# Patient Record
Sex: MALE | Race: WHITE | NOT HISPANIC OR LATINO | Employment: FULL TIME | RURAL
[De-identification: names, ages, dates, MRNs, and addresses within clinical notes are randomized per-mention and may not be internally consistent; named-entity substitution may affect disease eponyms.]

---

## 2021-04-20 DIAGNOSIS — R00.2 PALPITATIONS: Primary | ICD-10-CM

## 2021-04-20 RX ORDER — NYSTATIN 100000 [USP'U]/ML
SUSPENSION ORAL
COMMUNITY
Start: 2021-03-04 | End: 2022-02-18 | Stop reason: ALTCHOICE

## 2021-04-20 RX ORDER — ETANERCEPT 50 MG/ML
50 SOLUTION SUBCUTANEOUS WEEKLY
COMMUNITY

## 2021-04-20 RX ORDER — METOPROLOL SUCCINATE 25 MG/1
25 TABLET, EXTENDED RELEASE ORAL DAILY
Qty: 90 TABLET | Refills: 2 | Status: SHIPPED | OUTPATIENT
Start: 2021-04-20 | End: 2022-01-20 | Stop reason: SDUPTHER

## 2021-04-20 RX ORDER — METOPROLOL SUCCINATE 25 MG/1
25 TABLET, EXTENDED RELEASE ORAL DAILY
COMMUNITY
End: 2021-04-20 | Stop reason: SDUPTHER

## 2021-04-20 RX ORDER — FLUTICASONE PROPIONATE 50 MCG
1 SPRAY, SUSPENSION (ML) NASAL 2 TIMES DAILY
COMMUNITY
End: 2023-04-02 | Stop reason: CLARIF

## 2021-04-20 RX ORDER — PANTOPRAZOLE SODIUM 40 MG/1
40 TABLET, DELAYED RELEASE ORAL DAILY
COMMUNITY

## 2021-08-24 ENCOUNTER — CLINICAL SUPPORT (OUTPATIENT)
Dept: PRIMARY CARE CLINIC | Facility: CLINIC | Age: 41
End: 2021-08-24

## 2021-08-24 DIAGNOSIS — R94.120 ABNORMAL AUDIOGRAM: ICD-10-CM

## 2021-08-24 PROCEDURE — 92552 PURE TONE AUDIOMETRY AIR: CPT | Mod: ,,, | Performed by: NURSE PRACTITIONER

## 2021-08-24 PROCEDURE — 92552 PR PURE TONE AUDIOMETRY, AIR: ICD-10-PCS | Mod: ,,, | Performed by: NURSE PRACTITIONER

## 2021-11-08 ENCOUNTER — OFFICE VISIT (OUTPATIENT)
Dept: CARDIOLOGY | Facility: CLINIC | Age: 41
End: 2021-11-08
Payer: OTHER GOVERNMENT

## 2021-11-08 VITALS
OXYGEN SATURATION: 97 % | SYSTOLIC BLOOD PRESSURE: 100 MMHG | HEIGHT: 72 IN | DIASTOLIC BLOOD PRESSURE: 60 MMHG | WEIGHT: 315 LBS | BODY MASS INDEX: 42.66 KG/M2 | HEART RATE: 67 BPM

## 2021-11-08 DIAGNOSIS — R00.2 PALPITATIONS: Primary | ICD-10-CM

## 2021-11-08 PROCEDURE — 93010 ELECTROCARDIOGRAM REPORT: CPT | Mod: S$PBB,,, | Performed by: INTERNAL MEDICINE

## 2021-11-08 PROCEDURE — 99213 PR OFFICE/OUTPT VISIT, EST, LEVL III, 20-29 MIN: ICD-10-PCS | Mod: S$PBB,,, | Performed by: INTERNAL MEDICINE

## 2021-11-08 PROCEDURE — 93010 EKG 12-LEAD: ICD-10-PCS | Mod: S$PBB,,, | Performed by: INTERNAL MEDICINE

## 2021-11-08 PROCEDURE — 99213 OFFICE O/P EST LOW 20 MIN: CPT | Mod: S$PBB,,, | Performed by: INTERNAL MEDICINE

## 2021-11-08 PROCEDURE — 93005 ELECTROCARDIOGRAM TRACING: CPT | Mod: PBBFAC | Performed by: INTERNAL MEDICINE

## 2021-11-08 PROCEDURE — 99213 OFFICE O/P EST LOW 20 MIN: CPT | Mod: PBBFAC | Performed by: INTERNAL MEDICINE

## 2022-02-18 ENCOUNTER — OFFICE VISIT (OUTPATIENT)
Dept: FAMILY MEDICINE | Facility: CLINIC | Age: 42
End: 2022-02-18
Payer: OTHER GOVERNMENT

## 2022-02-18 VITALS
HEIGHT: 72 IN | RESPIRATION RATE: 20 BRPM | DIASTOLIC BLOOD PRESSURE: 84 MMHG | TEMPERATURE: 99 F | BODY MASS INDEX: 41.31 KG/M2 | SYSTOLIC BLOOD PRESSURE: 128 MMHG | HEART RATE: 71 BPM | WEIGHT: 305 LBS | OXYGEN SATURATION: 96 %

## 2022-02-18 DIAGNOSIS — M25.579 ANKLE PAIN, UNSPECIFIED CHRONICITY, UNSPECIFIED LATERALITY: Primary | ICD-10-CM

## 2022-02-18 DIAGNOSIS — M79.672 LEFT FOOT PAIN: ICD-10-CM

## 2022-02-18 PROCEDURE — 99203 OFFICE O/P NEW LOW 30 MIN: CPT | Mod: ,,, | Performed by: FAMILY MEDICINE

## 2022-02-18 PROCEDURE — 99203 PR OFFICE/OUTPT VISIT, NEW, LEVL III, 30-44 MIN: ICD-10-PCS | Mod: ,,, | Performed by: FAMILY MEDICINE

## 2022-02-18 NOTE — PROGRESS NOTES
Jigar Larson is a 41 y.o. male seen today for acute injury to his left ankle were patient misstepped stepping down from a ladder.  He is able to bear weight on the affected ankle but this is very painful and swelling is already started to take effect after 1 hour.    Past Medical History:   Diagnosis Date    GERD (gastroesophageal reflux disease)     Palpitations     Rheumatoid arthritis      Family History   Problem Relation Age of Onset    Allergies Brother      Current Outpatient Medications on File Prior to Visit   Medication Sig Dispense Refill    etanercept (ENBREL) 50 mg/mL (1 mL) injection Inject 50 mg into the skin once a week.      fluticasone propionate (FLONASE) 50 mcg/actuation nasal spray 1 spray by Each Nostril route 2 (two) times a day.      metoprolol succinate (TOPROL-XL) 25 MG 24 hr tablet Take 1 tablet (25 mg total) by mouth once daily. 90 tablet 2    pantoprazole (PROTONIX) 40 MG tablet Take 40 mg by mouth once daily.      nystatin (MYCOSTATIN) 100,000 unit/mL suspension SWISH & SPIT ONE TEASPOONFUL BY MOUTH 4 TIMES DAILY ...AVOID EATING 5-10 MINUTES AFTER EACH USE (SHAKE WELL)       No current facility-administered medications on file prior to visit.     Immunization History   Administered Date(s) Administered    COVID-19, MRNA, LN-S, PF (Pfizer) (Purple Cap) 04/09/2021, 04/30/2021       Review of Systems   Musculoskeletal: Positive for joint pain and myalgias.        Vitals:    02/18/22 1437   BP: 128/84   Pulse: 71   Resp: 20   Temp: 98.9 °F (37.2 °C)       Physical Exam  Musculoskeletal:      Left ankle: Tenderness present. Decreased range of motion.        Legs:       Comments: Patient has marked lateral ankle swelling with a negative drawer test.          Assessment and Plan  Ankle pain, unspecified chronicity, unspecified laterality  -     X-Ray Ankle Complete 3 View Left; Future; Expected date: 02/18/2022    Left foot pain  -     X-Ray Foot 2 View Left; Future; Expected date:  02/18/2022            Return to clinic in 1 week for follow-up or as needed if symptoms worsen.  I have recommended RICE therapy and no severe weight-bearing.    The patient has no Health Maintenance topics of status Not Due

## 2022-02-21 ENCOUNTER — TELEPHONE (OUTPATIENT)
Dept: FAMILY MEDICINE | Facility: CLINIC | Age: 42
End: 2022-02-21
Payer: OTHER GOVERNMENT

## 2022-02-21 NOTE — TELEPHONE ENCOUNTER
----- Message from Rasta Carmichael MD sent at 2/18/2022  5:09 PM CST -----  No fracture but he does have heel spur and some arthritic changes.

## 2022-02-25 ENCOUNTER — OFFICE VISIT (OUTPATIENT)
Dept: FAMILY MEDICINE | Facility: CLINIC | Age: 42
End: 2022-02-25
Payer: OTHER GOVERNMENT

## 2022-02-25 VITALS
HEART RATE: 71 BPM | WEIGHT: 305 LBS | DIASTOLIC BLOOD PRESSURE: 86 MMHG | RESPIRATION RATE: 18 BRPM | HEIGHT: 72 IN | OXYGEN SATURATION: 99 % | TEMPERATURE: 98 F | BODY MASS INDEX: 41.31 KG/M2 | SYSTOLIC BLOOD PRESSURE: 134 MMHG

## 2022-02-25 DIAGNOSIS — G89.29 CHRONIC MIDLINE LOW BACK PAIN WITHOUT SCIATICA: Primary | ICD-10-CM

## 2022-02-25 DIAGNOSIS — M54.50 CHRONIC MIDLINE LOW BACK PAIN WITHOUT SCIATICA: Primary | ICD-10-CM

## 2022-02-25 DIAGNOSIS — M25.579 ANKLE PAIN, UNSPECIFIED CHRONICITY, UNSPECIFIED LATERALITY: ICD-10-CM

## 2022-02-25 PROCEDURE — 99213 PR OFFICE/OUTPT VISIT, EST, LEVL III, 20-29 MIN: ICD-10-PCS | Mod: ,,, | Performed by: FAMILY MEDICINE

## 2022-02-25 PROCEDURE — 99213 OFFICE O/P EST LOW 20 MIN: CPT | Mod: ,,, | Performed by: FAMILY MEDICINE

## 2022-02-25 RX ORDER — MELOXICAM 7.5 MG/1
7.5 TABLET ORAL DAILY PRN
Qty: 30 TABLET | Refills: 2 | Status: SHIPPED | OUTPATIENT
Start: 2022-02-25 | End: 2022-05-31 | Stop reason: SDUPTHER

## 2022-02-25 RX ORDER — TIZANIDINE 4 MG/1
4 TABLET ORAL NIGHTLY PRN
Qty: 30 TABLET | Refills: 2 | Status: SHIPPED | OUTPATIENT
Start: 2022-02-25 | End: 2022-05-22 | Stop reason: SDUPTHER

## 2022-02-25 NOTE — PROGRESS NOTES
Jigar Larson is a 41 y.o. male seen today for follow-up on his ankle sprain.  Patient reports a marked improvement in his symptoms and he is now able to weightbear with minimal discomfort.  We discussed slowly returning back to his baseline activity but he can come out of the ankle splint.  He complains of intermittent symptoms of low back pain primarily on the right side that usually flares up when he overdoes it.  We discussed the use of a muscle relaxant and anti-inflammatory with food as needed.        Past Medical History:   Diagnosis Date    GERD (gastroesophageal reflux disease)     Palpitations     Rheumatoid arthritis      Family History   Problem Relation Age of Onset    Allergies Brother      Current Outpatient Medications on File Prior to Visit   Medication Sig Dispense Refill    etanercept (ENBREL) 50 mg/mL (1 mL) injection Inject 50 mg into the skin once a week.      fluticasone propionate (FLONASE) 50 mcg/actuation nasal spray 1 spray by Each Nostril route 2 (two) times a day.      metoprolol succinate (TOPROL-XL) 25 MG 24 hr tablet Take 1 tablet (25 mg total) by mouth once daily. 90 tablet 2    pantoprazole (PROTONIX) 40 MG tablet Take 40 mg by mouth once daily.       No current facility-administered medications on file prior to visit.     Immunization History   Administered Date(s) Administered    COVID-19, MRNA, LN-S, PF (Pfizer) (Purple Cap) 04/09/2021, 04/30/2021       Review of Systems   Musculoskeletal: Positive for back pain, joint pain and myalgias.        Vitals:    02/25/22 1313   BP: 134/86   Pulse: 71   Resp: 18   Temp: 97.9 °F (36.6 °C)       Physical Exam  Eyes:      Conjunctiva/sclera: Conjunctivae normal.   Pulmonary:      Effort: Pulmonary effort is normal.   Musculoskeletal:      Lumbar back: Spasms and tenderness present. Decreased range of motion.        Back:       Left ankle: Swelling present. No tenderness. Decreased range of motion.      Comments: Patient now has only  trace swelling to the left ankle with only mild decreased range of motion and no tenderness.   Skin:     General: Skin is warm and dry.   Neurological:      Mental Status: He is alert.          Assessment and Plan  Chronic midline low back pain without sciatica  -     meloxicam (MOBIC) 7.5 MG tablet; Take 1 tablet (7.5 mg total) by mouth daily as needed for Pain (take with food).  Dispense: 30 tablet; Refill: 2  -     tiZANidine (ZANAFLEX) 4 MG tablet; Take 1 tablet (4 mg total) by mouth nightly as needed (spasm).  Dispense: 30 tablet; Refill: 2    Ankle pain, unspecified chronicity, unspecified laterality            Return to clinic as needed and for his wellness visit.    The patient has no Health Maintenance topics of status Not Due

## 2022-04-14 ENCOUNTER — OFFICE VISIT (OUTPATIENT)
Dept: FAMILY MEDICINE | Facility: CLINIC | Age: 42
End: 2022-04-14
Payer: OTHER GOVERNMENT

## 2022-04-14 VITALS
OXYGEN SATURATION: 98 % | SYSTOLIC BLOOD PRESSURE: 120 MMHG | BODY MASS INDEX: 41.31 KG/M2 | TEMPERATURE: 99 F | HEART RATE: 66 BPM | RESPIRATION RATE: 18 BRPM | HEIGHT: 72 IN | DIASTOLIC BLOOD PRESSURE: 84 MMHG | WEIGHT: 305 LBS

## 2022-04-14 DIAGNOSIS — Z00.00 ROUTINE GENERAL MEDICAL EXAMINATION AT A HEALTH CARE FACILITY: Primary | ICD-10-CM

## 2022-04-14 DIAGNOSIS — Z13.1 SCREENING FOR DIABETES MELLITUS: ICD-10-CM

## 2022-04-14 DIAGNOSIS — Z13.220 SCREENING FOR LIPOID DISORDERS: ICD-10-CM

## 2022-04-14 PROCEDURE — 99396 PREV VISIT EST AGE 40-64: CPT | Mod: ,,, | Performed by: FAMILY MEDICINE

## 2022-04-14 PROCEDURE — 99396 PR PREVENTIVE VISIT,EST,40-64: ICD-10-PCS | Mod: ,,, | Performed by: FAMILY MEDICINE

## 2022-04-14 NOTE — PROGRESS NOTES
Jigra Larson is a 42 y.o. male seen today for his wellness visit.  He denies any chest pain or shortness of breath.  Patient is not fasting today for lab work but will come in next week.  He is doing well with no new complaints.      Past Medical History:   Diagnosis Date    GERD (gastroesophageal reflux disease)     Palpitations     Rheumatoid arthritis      Family History   Problem Relation Age of Onset    Allergies Brother      Current Outpatient Medications on File Prior to Visit   Medication Sig Dispense Refill    etanercept (ENBREL) 50 mg/mL (1 mL) injection Inject 50 mg into the skin once a week.      fluticasone propionate (FLONASE) 50 mcg/actuation nasal spray 1 spray by Each Nostril route 2 (two) times a day.      meloxicam (MOBIC) 7.5 MG tablet Take 1 tablet (7.5 mg total) by mouth daily as needed for Pain (take with food). 30 tablet 2    metoprolol succinate (TOPROL-XL) 25 MG 24 hr tablet Take 1 tablet (25 mg total) by mouth once daily. 90 tablet 2    pantoprazole (PROTONIX) 40 MG tablet Take 40 mg by mouth once daily.       No current facility-administered medications on file prior to visit.     Immunization History   Administered Date(s) Administered    COVID-19, MRNA, LN-S, PF (Pfizer) (Purple Cap) 04/09/2021, 04/30/2021       Review of Systems   Constitutional: Negative for fever, malaise/fatigue and weight loss.   Respiratory: Negative for shortness of breath.    Cardiovascular: Negative for chest pain and palpitations.   Gastrointestinal: Negative for nausea and vomiting.   Psychiatric/Behavioral: Negative for depression.        Vitals:    04/14/22 1107   BP: 120/84   Pulse: 66   Resp: 18   Temp: 98.6 °F (37 °C)       Physical Exam  Vitals reviewed.   Constitutional:       Appearance: Normal appearance.   HENT:      Head: Normocephalic.   Eyes:      Extraocular Movements: Extraocular movements intact.      Conjunctiva/sclera: Conjunctivae normal.      Pupils: Pupils are equal, round, and  reactive to light.   Neck:      Thyroid: No thyroid mass or thyromegaly.   Cardiovascular:      Rate and Rhythm: Normal rate and regular rhythm.      Heart sounds: Normal heart sounds. No murmur heard.    No gallop.   Pulmonary:      Effort: Pulmonary effort is normal. No respiratory distress.      Breath sounds: Normal breath sounds. No wheezing or rales.   Skin:     General: Skin is warm and dry.      Coloration: Skin is not jaundiced or pale.   Neurological:      Mental Status: He is alert.   Psychiatric:         Mood and Affect: Mood normal.         Behavior: Behavior normal.         Thought Content: Thought content normal.         Judgment: Judgment normal.          Assessment and Plan  Routine general medical examination at a health care facility    Screening for diabetes mellitus  -     Lipid Panel; Future; Expected date: 04/21/2022    Screening for lipoid disorders  -     Glucose, Fasting; Future; Expected date: 04/21/2022            Return to clinic in 6 months or as needed once his lab work is in.    The patient has no Health Maintenance topics of status Not Due

## 2022-04-25 ENCOUNTER — TELEPHONE (OUTPATIENT)
Dept: FAMILY MEDICINE | Facility: CLINIC | Age: 42
End: 2022-04-25
Payer: OTHER GOVERNMENT

## 2022-04-25 NOTE — TELEPHONE ENCOUNTER
----- Message from Rasta Carmichael MD sent at 4/19/2022  7:55 AM CDT -----  Office visit her LDL cholesterol.

## 2022-08-29 ENCOUNTER — CLINICAL SUPPORT (OUTPATIENT)
Dept: PRIMARY CARE CLINIC | Facility: CLINIC | Age: 42
End: 2022-08-29
Payer: OTHER GOVERNMENT

## 2022-08-29 DIAGNOSIS — Z01.10 ENCOUNTER FOR HEARING EXAMINATION, UNSPECIFIED WHETHER ABNORMAL FINDINGS: Primary | ICD-10-CM

## 2022-08-29 PROCEDURE — 92552 PR PURE TONE AUDIOMETRY, AIR: ICD-10-PCS | Mod: ,,, | Performed by: NURSE PRACTITIONER

## 2022-08-29 PROCEDURE — 92552 PURE TONE AUDIOMETRY AIR: CPT | Mod: ,,, | Performed by: NURSE PRACTITIONER

## 2022-08-29 NOTE — PROGRESS NOTES
Subjective:       Patient ID: Jigar Larson is a 42 y.o. male.    Chief Complaint: No chief complaint on file.    HPI  Review of Systems      Objective:      Physical Exam    Assessment:       Problem List Items Addressed This Visit    None  Visit Diagnoses       Encounter for hearing examination, unspecified whether abnormal findings    -  Primary            Plan:       Audiogram only

## 2022-10-14 ENCOUNTER — OFFICE VISIT (OUTPATIENT)
Dept: FAMILY MEDICINE | Facility: CLINIC | Age: 42
End: 2022-10-14
Payer: OTHER GOVERNMENT

## 2022-10-14 VITALS
WEIGHT: 305 LBS | BODY MASS INDEX: 41.31 KG/M2 | HEIGHT: 72 IN | OXYGEN SATURATION: 97 % | RESPIRATION RATE: 18 BRPM | SYSTOLIC BLOOD PRESSURE: 136 MMHG | TEMPERATURE: 98 F | HEART RATE: 77 BPM | DIASTOLIC BLOOD PRESSURE: 86 MMHG

## 2022-10-14 DIAGNOSIS — G89.29 CHRONIC MIDLINE LOW BACK PAIN WITHOUT SCIATICA: Primary | ICD-10-CM

## 2022-10-14 DIAGNOSIS — Z23 NEED FOR IMMUNIZATION AGAINST INFLUENZA: ICD-10-CM

## 2022-10-14 DIAGNOSIS — M54.50 CHRONIC MIDLINE LOW BACK PAIN WITHOUT SCIATICA: Primary | ICD-10-CM

## 2022-10-14 PROCEDURE — 99213 OFFICE O/P EST LOW 20 MIN: CPT | Mod: 25,,, | Performed by: FAMILY MEDICINE

## 2022-10-14 PROCEDURE — 90686 IIV4 VACC NO PRSV 0.5 ML IM: CPT | Mod: ,,, | Performed by: FAMILY MEDICINE

## 2022-10-14 PROCEDURE — 90471 FLU VACCINE (QUAD) GREATER THAN OR EQUAL TO 3YO PRESERVATIVE FREE IM: ICD-10-PCS | Mod: ,,, | Performed by: FAMILY MEDICINE

## 2022-10-14 PROCEDURE — 90686 FLU VACCINE (QUAD) GREATER THAN OR EQUAL TO 3YO PRESERVATIVE FREE IM: ICD-10-PCS | Mod: ,,, | Performed by: FAMILY MEDICINE

## 2022-10-14 PROCEDURE — 99213 PR OFFICE/OUTPT VISIT, EST, LEVL III, 20-29 MIN: ICD-10-PCS | Mod: 25,,, | Performed by: FAMILY MEDICINE

## 2022-10-14 PROCEDURE — 90471 IMMUNIZATION ADMIN: CPT | Mod: ,,, | Performed by: FAMILY MEDICINE

## 2022-10-14 RX ORDER — MELOXICAM 7.5 MG/1
TABLET ORAL
Qty: 30 TABLET | Refills: 2 | Status: SHIPPED | OUTPATIENT
Start: 2022-10-14 | End: 2023-04-02 | Stop reason: CLARIF

## 2022-10-14 NOTE — PROGRESS NOTES
Jigar Larson is a 42 y.o. male seen today for follow-up on his chronic low back pain. Patient reports the symptoms are intermittent and his meloxicam is working with the flare.  Patient has had no chest pain and shortness of breath is doing well.  Patient's lab work did show glucose and lipid panel that were to goal.      Past Medical History:   Diagnosis Date    GERD (gastroesophageal reflux disease)     Palpitations     Rheumatoid arthritis      Family History   Problem Relation Age of Onset    Allergies Brother      Current Outpatient Medications on File Prior to Visit   Medication Sig Dispense Refill    etanercept (ENBREL) 50 mg/mL (1 mL) injection Inject 50 mg into the skin once a week.      fluticasone propionate (FLONASE) 50 mcg/actuation nasal spray 1 spray by Each Nostril route 2 (two) times a day.      metoprolol succinate (TOPROL-XL) 25 MG 24 hr tablet Take 1 tablet (25 mg total) by mouth once daily. 90 tablet 2    pantoprazole (PROTONIX) 40 MG tablet Take 40 mg by mouth once daily.      [DISCONTINUED] meloxicam (MOBIC) 7.5 MG tablet TAKE 1 TABLET BY MOUTH ONCE DAILY AS NEEDED FOR PAIN WITH FOOD OR MILK 30 tablet 2     No current facility-administered medications on file prior to visit.     Immunization History   Administered Date(s) Administered    COVID-19, MRNA, LN-S, PF (Pfizer) (Purple Cap) 04/09/2021, 04/30/2021    Influenza - Quadrivalent - PF *Preferred* (6 months and older) 10/14/2022       Review of Systems   HENT:  Negative for hearing loss.    Eyes:  Negative for discharge.   Respiratory:  Negative for wheezing.    Cardiovascular:  Negative for chest pain and palpitations.   Gastrointestinal:  Negative for blood in stool, constipation, diarrhea and vomiting.   Genitourinary:  Negative for hematuria and urgency.   Musculoskeletal:  Positive for back pain. Negative for neck pain.   Neurological:  Negative for weakness and headaches.   Endo/Heme/Allergies:  Negative for polydipsia.      Vitals:     10/14/22 1412   BP: 136/86   Pulse: 77   Resp: 18   Temp: 98.4 °F (36.9 °C)       Physical Exam  Vitals reviewed.   Constitutional:       Appearance: Normal appearance.   HENT:      Head: Normocephalic.   Eyes:      Extraocular Movements: Extraocular movements intact.      Conjunctiva/sclera: Conjunctivae normal.      Pupils: Pupils are equal, round, and reactive to light.   Neck:      Thyroid: No thyroid mass or thyromegaly.   Cardiovascular:      Rate and Rhythm: Normal rate and regular rhythm.      Heart sounds: Normal heart sounds. No murmur heard.    No gallop.   Pulmonary:      Effort: Pulmonary effort is normal. No respiratory distress.      Breath sounds: Normal breath sounds. No wheezing or rales.   Skin:     General: Skin is warm and dry.      Coloration: Skin is not jaundiced or pale.   Neurological:      Mental Status: He is alert.   Psychiatric:         Mood and Affect: Mood normal.         Behavior: Behavior normal.         Thought Content: Thought content normal.         Judgment: Judgment normal.        Assessment and Plan  Chronic midline low back pain without sciatica  -     meloxicam (MOBIC) 7.5 MG tablet; TAKE 1 TABLET BY MOUTH ONCE DAILY AS NEEDED FOR PAIN WITH FOOD OR MILK Strength: 7.5 mg  Dispense: 30 tablet; Refill: 2    Need for immunization against influenza  -     Influenza - Quadrivalent *Preferred* (6 months+) (PF)          Return to clinic there is wellness as severe or as negative    Health Maintenance Topics with due status: Not Due       Topic Last Completion Date    Lipid Panel 04/18/2022         Answers submitted by the patient for this visit:  Review of Systems Questionnaire (Submitted on 10/14/2022)  activity change: No  unexpected weight change: No  rhinorrhea: No  trouble swallowing: No  visual disturbance: No  chest tightness: No  polyuria: No  difficulty urinating: No  joint swelling: Yes  arthralgias: Yes  confusion: No  dysphoric mood: No

## 2022-11-08 ENCOUNTER — OFFICE VISIT (OUTPATIENT)
Dept: CARDIOLOGY | Facility: CLINIC | Age: 42
End: 2022-11-08
Payer: OTHER GOVERNMENT

## 2022-11-08 VITALS
BODY MASS INDEX: 42.26 KG/M2 | SYSTOLIC BLOOD PRESSURE: 132 MMHG | HEIGHT: 72 IN | OXYGEN SATURATION: 97 % | WEIGHT: 312 LBS | DIASTOLIC BLOOD PRESSURE: 76 MMHG | HEART RATE: 72 BPM

## 2022-11-08 DIAGNOSIS — R00.2 PALPITATIONS: ICD-10-CM

## 2022-11-08 DIAGNOSIS — Z86.79 HISTORY OF ATRIAL FIBRILLATION: ICD-10-CM

## 2022-11-08 DIAGNOSIS — I47.29 NSVT (NONSUSTAINED VENTRICULAR TACHYCARDIA): Primary | ICD-10-CM

## 2022-11-08 PROCEDURE — 93010 EKG 12-LEAD: ICD-10-PCS | Mod: S$PBB,,, | Performed by: STUDENT IN AN ORGANIZED HEALTH CARE EDUCATION/TRAINING PROGRAM

## 2022-11-08 PROCEDURE — 99213 OFFICE O/P EST LOW 20 MIN: CPT | Mod: S$PBB,,, | Performed by: NURSE PRACTITIONER

## 2022-11-08 PROCEDURE — 99213 OFFICE O/P EST LOW 20 MIN: CPT | Mod: PBBFAC | Performed by: NURSE PRACTITIONER

## 2022-11-08 PROCEDURE — 99213 PR OFFICE/OUTPT VISIT, EST, LEVL III, 20-29 MIN: ICD-10-PCS | Mod: S$PBB,,, | Performed by: NURSE PRACTITIONER

## 2022-11-08 PROCEDURE — 93010 ELECTROCARDIOGRAM REPORT: CPT | Mod: S$PBB,,, | Performed by: STUDENT IN AN ORGANIZED HEALTH CARE EDUCATION/TRAINING PROGRAM

## 2022-11-08 PROCEDURE — 93005 ELECTROCARDIOGRAM TRACING: CPT | Mod: PBBFAC | Performed by: STUDENT IN AN ORGANIZED HEALTH CARE EDUCATION/TRAINING PROGRAM

## 2022-11-08 RX ORDER — METOPROLOL SUCCINATE 25 MG/1
25 TABLET, EXTENDED RELEASE ORAL DAILY
Qty: 90 TABLET | Refills: 2 | Status: SHIPPED | OUTPATIENT
Start: 2022-11-08 | End: 2023-04-28

## 2022-11-10 PROBLEM — Z86.79 HISTORY OF ATRIAL FIBRILLATION: Status: ACTIVE | Noted: 2022-11-10

## 2022-11-10 PROBLEM — I47.29 NSVT (NONSUSTAINED VENTRICULAR TACHYCARDIA): Status: ACTIVE | Noted: 2022-11-10

## 2022-11-10 NOTE — PROGRESS NOTES
PCP: Rasta Carmichael MD    Referring Provider:     Subjective:   Jigar Larson is a 42 y.o. male with hx of atrial fibrillation in 2010, sleep apnea compliant with cpap who presents for 1 year follow up, previous pt of Dr. Aguirre.  41 y.o. male with hx of atrial fibrillation in 2010, sleep apnea, who presents for follow up.      Patient states he has had no further episodes of heart racing.       Denies any CP, dyspnea, palpitations or syncope.              Fhx: Family history negative for sudden death.  Shx:     EKG 11/8/22: NSR with T wave inversion in III & aVF, no changes when compared to previous EKG 11/8/2021 11/8/21:  Normal sinus rhythm.  Holter 8/4/2020:  Maximum heart rate of 137 bpm was sinus tachycardia. Short run of nonsustained ventricular tachycardia, 5 beats.                              Overall PVC burden is less than 1%  ECHO 8/4/2022: Normal left ventricular cavity with overall normal systolic function. LVEF 55-60%.                              No significant valvular abnormalities were noted.   C 8/25/2020:  Nonobstructive coronary artery disease.                             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      No results found for: NA, K, CL, CO2, BUN, CREATININE, CALCIUM, ANIONGAP, ESTGFRAFRICA, EGFRNONAA    Lab Results   Component Value Date    CHOL 162 04/18/2022     Lab Results   Component Value Date    HDL 45 04/18/2022     Lab Results   Component Value Date    LDLCALC 95 04/18/2022     Lab Results   Component Value Date    TRIG 109 04/18/2022     Lab Results   Component Value Date    CHOLHDL 3.6 04/18/2022       No results found for: WBC, HGB, HCT, MCV, PLT        Current Outpatient Medications:     etanercept (ENBREL) 50 mg/mL (1 mL) injection, Inject 50 mg into the skin once a week., Disp: , Rfl:     fluticasone propionate (FLONASE) 50 mcg/actuation nasal spray, 1 spray by Each Nostril route 2 (two) times a day., Disp: , Rfl:      meloxicam (MOBIC) 7.5 MG tablet, TAKE 1 TABLET BY MOUTH ONCE DAILY AS NEEDED FOR PAIN WITH FOOD OR MILK Strength: 7.5 mg, Disp: 30 tablet, Rfl: 2    pantoprazole (PROTONIX) 40 MG tablet, Take 40 mg by mouth once daily., Disp: , Rfl:     metoprolol succinate (TOPROL-XL) 25 MG 24 hr tablet, Take 1 tablet (25 mg total) by mouth once daily., Disp: 90 tablet, Rfl: 2    Review of Systems   Constitutional:  Negative for chills, fever and malaise/fatigue.   HENT: Negative.     Eyes: Negative.    Respiratory:  Negative for shortness of breath.    Cardiovascular:  Negative for chest pain, palpitations, orthopnea and leg swelling.   Gastrointestinal: Negative.    Musculoskeletal: Negative.    Skin: Negative.    Neurological:  Negative for dizziness and headaches.       Objective:   /76 (BP Location: Left arm, Patient Position: Sitting, BP Method: X-Large (Manual))   Pulse 72   Ht 6' (1.829 m)   Wt (!) 141.5 kg (312 lb)   SpO2 97%   BMI 42.31 kg/m²     Physical Exam  Vitals reviewed.   Constitutional:       Appearance: He is obese.   HENT:      Nose: Nose normal.      Mouth/Throat:      Mouth: Mucous membranes are moist.      Pharynx: Oropharynx is clear.   Eyes:      General: No scleral icterus.     Pupils: Pupils are equal, round, and reactive to light.   Neck:      Vascular: No carotid bruit.   Cardiovascular:      Rate and Rhythm: Normal rate and regular rhythm.      Pulses: Normal pulses.      Heart sounds: Normal heart sounds.   Pulmonary:      Effort: Pulmonary effort is normal.      Breath sounds: Normal breath sounds. No wheezing, rhonchi or rales.   Abdominal:      General: Bowel sounds are normal.      Palpations: Abdomen is soft.   Musculoskeletal:      Cervical back: Neck supple.      Right lower leg: No edema.      Left lower leg: No edema.   Skin:     General: Skin is warm and dry.      Coloration: Skin is not jaundiced or pale.   Neurological:      Mental Status: He is alert and oriented to person,  place, and time.   Psychiatric:         Mood and Affect: Mood normal.         Behavior: Behavior normal.       Assessment:     1. NSVT (nonsustained ventricular tachycardia)        2. Palpitations  EKG 12-lead    EKG 12-lead    metoprolol succinate (TOPROL-XL) 25 MG 24 hr tablet      3. History of atrial fibrillation              Plan:   NSVT (nonsustained ventricular tachycardia)  Single episode during ETT  Echo 8/2020 normal  LHC 8/2020 normal  Reports hx of atrial fibrillation 2010    No CP or palpitations  EKG today NSR with T wave inversion in III & aVF, no changes when compared to previous on 11/8/2021  Continue Toprol XL 25mg daily    History of atrial fibrillation  Reports hx of afib 2010    Follow up in 1 year, or sooner if needed

## 2022-11-10 NOTE — ASSESSMENT & PLAN NOTE
Single episode during ETT  Echo 8/2020 normal  LHC 8/2020 normal  Reports hx of atrial fibrillation 2010    No CP or palpitations  EKG today NSR with T wave inversion in III & aVF, no changes when compared to previous on 11/8/2021  Continue Toprol XL 25mg daily

## 2022-11-12 ENCOUNTER — HOSPITAL ENCOUNTER (EMERGENCY)
Facility: HOSPITAL | Age: 42
Discharge: HOME | End: 2022-11-12
Attending: EMERGENCY MEDICINE
Payer: OTHER GOVERNMENT

## 2022-11-12 VITALS
OXYGEN SATURATION: 96 % | BODY MASS INDEX: 41.85 KG/M2 | HEART RATE: 76 BPM | HEIGHT: 72 IN | DIASTOLIC BLOOD PRESSURE: 76 MMHG | TEMPERATURE: 99 F | RESPIRATION RATE: 29 BRPM | SYSTOLIC BLOOD PRESSURE: 132 MMHG | WEIGHT: 309 LBS

## 2022-11-12 DIAGNOSIS — R07.9 CHEST PAIN: Primary | ICD-10-CM

## 2022-11-12 LAB
ALBUMIN SERPL BCP-MCNC: 3.6 G/DL (ref 3.5–5)
ALBUMIN/GLOB SERPL: 1.1 {RATIO}
ALP SERPL-CCNC: 117 U/L (ref 45–115)
ALT SERPL W P-5'-P-CCNC: 57 U/L (ref 16–61)
ANION GAP SERPL CALCULATED.3IONS-SCNC: 14 MMOL/L (ref 7–16)
APTT PPP: 28.8 SECONDS (ref 25.2–37.3)
AST SERPL W P-5'-P-CCNC: 27 U/L (ref 15–37)
BASOPHILS # BLD AUTO: 0.03 K/UL (ref 0–0.2)
BASOPHILS NFR BLD AUTO: 0.4 % (ref 0–1)
BILIRUB SERPL-MCNC: 0.3 MG/DL (ref ?–1.2)
BUN SERPL-MCNC: 15 MG/DL (ref 7–18)
BUN/CREAT SERPL: 12 (ref 6–20)
CALCIUM SERPL-MCNC: 8.5 MG/DL (ref 8.5–10.1)
CHLORIDE SERPL-SCNC: 106 MMOL/L (ref 98–107)
CO2 SERPL-SCNC: 25 MMOL/L (ref 21–32)
CREAT SERPL-MCNC: 1.3 MG/DL (ref 0.7–1.3)
D DIMER PPP FEU-MCNC: <0.27 ΜG/ML (ref 0–0.47)
DIFFERENTIAL METHOD BLD: ABNORMAL
EGFR (NO RACE VARIABLE) (RUSH/TITUS): 69 ML/MIN/1.73M²
EOSINOPHIL # BLD AUTO: 0.1 K/UL (ref 0–0.5)
EOSINOPHIL NFR BLD AUTO: 1.3 % (ref 1–4)
ERYTHROCYTE [DISTWIDTH] IN BLOOD BY AUTOMATED COUNT: 14.1 % (ref 11.5–14.5)
GLOBULIN SER-MCNC: 3.2 G/DL (ref 2–4)
GLUCOSE SERPL-MCNC: 122 MG/DL (ref 74–106)
HCT VFR BLD AUTO: 42.5 % (ref 40–54)
HGB BLD-MCNC: 13.9 G/DL (ref 13.5–18)
IMM GRANULOCYTES # BLD AUTO: 0.02 K/UL (ref 0–0.04)
IMM GRANULOCYTES NFR BLD: 0.3 % (ref 0–0.4)
INR BLD: 1.04
LYMPHOCYTES # BLD AUTO: 1.8 K/UL (ref 1–4.8)
LYMPHOCYTES NFR BLD AUTO: 24.2 % (ref 27–41)
MAGNESIUM SERPL-MCNC: 1.7 MG/DL (ref 1.7–2.3)
MCH RBC QN AUTO: 26.7 PG (ref 27–31)
MCHC RBC AUTO-ENTMCNC: 32.7 G/DL (ref 32–36)
MCV RBC AUTO: 81.6 FL (ref 80–96)
MONOCYTES # BLD AUTO: 0.42 K/UL (ref 0–0.8)
MONOCYTES NFR BLD AUTO: 5.7 % (ref 2–6)
MPC BLD CALC-MCNC: 10.3 FL (ref 9.4–12.4)
NEUTROPHILS # BLD AUTO: 5.06 K/UL (ref 1.8–7.7)
NEUTROPHILS NFR BLD AUTO: 68.1 % (ref 53–65)
NRBC # BLD AUTO: 0 X10E3/UL
NRBC, AUTO (.00): 0 %
PLATELET # BLD AUTO: 252 K/UL (ref 150–400)
POTASSIUM SERPL-SCNC: 4.1 MMOL/L (ref 3.5–5.1)
PROT SERPL-MCNC: 6.8 G/DL (ref 6.4–8.2)
PROTHROMBIN TIME: 13.2 SECONDS (ref 11.7–14.7)
RBC # BLD AUTO: 5.21 M/UL (ref 4.6–6.2)
SODIUM SERPL-SCNC: 141 MMOL/L (ref 136–145)
TROPONIN I SERPL HS-MCNC: 6.9 PG/ML
TROPONIN I SERPL HS-MCNC: 9.5 PG/ML
TSH SERPL DL<=0.005 MIU/L-ACNC: 1.3 UIU/ML (ref 0.36–3.74)
WBC # BLD AUTO: 7.43 K/UL (ref 4.5–11)

## 2022-11-12 PROCEDURE — 85730 THROMBOPLASTIN TIME PARTIAL: CPT | Performed by: EMERGENCY MEDICINE

## 2022-11-12 PROCEDURE — 83735 ASSAY OF MAGNESIUM: CPT | Performed by: EMERGENCY MEDICINE

## 2022-11-12 PROCEDURE — 93005 ELECTROCARDIOGRAM TRACING: CPT

## 2022-11-12 PROCEDURE — 85379 FIBRIN DEGRADATION QUANT: CPT | Performed by: EMERGENCY MEDICINE

## 2022-11-12 PROCEDURE — 25000003 PHARM REV CODE 250: Performed by: EMERGENCY MEDICINE

## 2022-11-12 PROCEDURE — 93010 EKG 12-LEAD: ICD-10-PCS | Mod: ,,, | Performed by: STUDENT IN AN ORGANIZED HEALTH CARE EDUCATION/TRAINING PROGRAM

## 2022-11-12 PROCEDURE — 93010 ELECTROCARDIOGRAM REPORT: CPT | Mod: ,,, | Performed by: STUDENT IN AN ORGANIZED HEALTH CARE EDUCATION/TRAINING PROGRAM

## 2022-11-12 PROCEDURE — 85025 COMPLETE CBC W/AUTO DIFF WBC: CPT | Performed by: EMERGENCY MEDICINE

## 2022-11-12 PROCEDURE — 99284 EMERGENCY DEPT VISIT MOD MDM: CPT | Mod: ,,, | Performed by: EMERGENCY MEDICINE

## 2022-11-12 PROCEDURE — 80053 COMPREHEN METABOLIC PANEL: CPT | Performed by: EMERGENCY MEDICINE

## 2022-11-12 PROCEDURE — 84484 ASSAY OF TROPONIN QUANT: CPT | Performed by: EMERGENCY MEDICINE

## 2022-11-12 PROCEDURE — 99284 PR EMERGENCY DEPT VISIT,LEVEL IV: ICD-10-PCS | Mod: ,,, | Performed by: EMERGENCY MEDICINE

## 2022-11-12 PROCEDURE — 84443 ASSAY THYROID STIM HORMONE: CPT | Performed by: EMERGENCY MEDICINE

## 2022-11-12 PROCEDURE — 99285 EMERGENCY DEPT VISIT HI MDM: CPT | Mod: 25

## 2022-11-12 PROCEDURE — 85610 PROTHROMBIN TIME: CPT | Performed by: EMERGENCY MEDICINE

## 2022-11-12 PROCEDURE — 36415 COLL VENOUS BLD VENIPUNCTURE: CPT | Performed by: EMERGENCY MEDICINE

## 2022-11-12 RX ORDER — ASPIRIN 325 MG
325 TABLET ORAL
Status: COMPLETED | OUTPATIENT
Start: 2022-11-12 | End: 2022-11-12

## 2022-11-12 RX ADMIN — ASPIRIN 325 MG ORAL TABLET 325 MG: 325 PILL ORAL at 04:11

## 2022-11-12 NOTE — ED PROVIDER NOTES
Encounter Date: 11/12/2022    SCRIBE #1 NOTE: I, Lorri Kavin, am scribing for, and in the presence of,  Syed Duff MD. I have scribed the entire note.     History     Chief Complaint   Patient presents with    Chest Pain     Patient is a 42 y.o. male who presents to the emergency department with complaints of chest pain and tightness. Patient explains that yesterday at 17:30 he felt his heart rate elevate and began to feel a tingling sensation in his left arm and fingers. He states that today while he was around his house working when he began to feel a tight and heavy sensation in his chest. He then checked his blood pressure several times afterwards and notes that it was high each time. Patient denies any fever, cough, or diarrhea. No other symptoms were reported.    The history is provided by the patient. No  was used.   Review of patient's allergies indicates:  No Known Allergies  Past Medical History:   Diagnosis Date    GERD (gastroesophageal reflux disease)     Palpitations     Rheumatoid arthritis      History reviewed. No pertinent surgical history.  Family History   Problem Relation Age of Onset    Allergies Brother      Social History     Tobacco Use    Smoking status: Never    Smokeless tobacco: Never   Substance Use Topics    Alcohol use: Never    Drug use: Never     Review of Systems   Constitutional:  Negative for fever.   Respiratory:  Positive for chest tightness. Negative for cough.    Cardiovascular:  Positive for chest pain.   Gastrointestinal:  Negative for diarrhea.   Endocrine: Negative.    Musculoskeletal:  Positive for back pain.        Left and hand pain and tingling   Skin: Negative.    Allergic/Immunologic: Negative.    Neurological: Negative.    Hematological: Negative.    Psychiatric/Behavioral: Negative.       Physical Exam     Initial Vitals [11/12/22 1516]   BP Pulse Resp Temp SpO2   (!) 162/81 100 16 98.8 °F (37.1 °C) 98 %      MAP       --          Physical Exam    Nursing note and vitals reviewed.  Constitutional: He appears well-developed and well-nourished. He is not diaphoretic. No distress.   HENT:   Head: Normocephalic and atraumatic.   Right Ear: External ear normal.   Left Ear: External ear normal.   Nose: Nose normal.   Eyes: Conjunctivae and EOM are normal.   Neck: Neck supple.   Cardiovascular:  Normal rate.           Pulmonary/Chest: Breath sounds normal. No stridor. No respiratory distress. He has no wheezes.   Musculoskeletal:      Cervical back: Neck supple. Normal.      Thoracic back: Normal.      Lumbar back: Normal.     Neurological: He is alert and oriented to person, place, and time. No cranial nerve deficit.   Skin: Skin is warm and dry. No pallor.   Psychiatric: He has a normal mood and affect. Thought content normal.       ED Course   Procedures  Labs Reviewed   COMPREHENSIVE METABOLIC PANEL - Abnormal; Notable for the following components:       Result Value    Glucose 122 (*)     Alk Phos 117 (*)     All other components within normal limits   CBC WITH DIFFERENTIAL - Abnormal; Notable for the following components:    MCH 26.7 (*)     Neutrophils % 68.1 (*)     Lymphocytes % 24.2 (*)     All other components within normal limits   APTT - Normal   PROTIME-INR - Normal   MAGNESIUM - Normal   TROPONIN I - Normal   TSH - Normal   D DIMER, QUANTITATIVE - Normal   TROPONIN I - Normal   CBC W/ AUTO DIFFERENTIAL    Narrative:     The following orders were created for panel order CBC auto differential.  Procedure                               Abnormality         Status                     ---------                               -----------         ------                     CBC with Differential[[id number]]        Abnormal            Final result                 Please view results for these tests on the individual orders.          Imaging Results              X-Ray Chest 1 View (Final result)  Result time 11/12/22 15:44:14      Final result  by Fazal Wright MD (11/12/22 15:44:14)                   Impression:      No acute cardiopulmonary process      Electronically signed by: Fazal Wright  Date:    11/12/2022  Time:    15:44               Narrative:    EXAMINATION:  XR CHEST 1 VIEW    CLINICAL HISTORY:  .  Chest pain, unspecified    COMPARISON:  No previous similar    TECHNIQUE:  AP portable semi erect chest    FINDINGS:  The cardiomediastinal silhouette and pulmonary vasculature are unremarkable.  Lungs and pleural spaces are clear.  There is no acute osseous abnormality                                       Medications   aspirin tablet 325 mg (325 mg Oral Given 11/12/22 1652)     Medical Decision Making:   ED Management:  Given the large differential diagnosis for Jigar Larson, the decision making in this case is of high complexity.    After evaluating all of the data points in this case, the presentation of Jigar Larson is NOT consistent with Acute Coronary Syndrome (ACS) and/or myocardial ischemia, pulmonary embolism, aortic dissection; Borhaave's, significant arrythmia, pneumothorax, cardiac tamponade, or other emergent cardiopulmonary condition.    Further, the presentation of Jigar Larson is NOT consistent with pericarditis, myocarditis, cholecystitis, pancreatitis, mediastinitis, endocarditis, new valvular disease.    Additionally, the presentation of Jigar Larson is NOT consistent with flail chest, cardiac contusion, ARDS, or significant intra-thoracic or intra-abdominal bleeding.    Similarly, this presentation is NOT consistent with pneumonia, sepsis, or pyelonephritis.    Strict return and follow-up precautions have been given by me personally to the patient/family/caregiver(s).    Data Reviewed/Counseling: I have reviewed the patient's vital signs, nursing notes, and other relevant tests/information. I had a detailed discussion regarding the historical points, exam findings, and any diagnostic results supporting the discharge diagnosis.  I also discussed the need for outpatient follow-up and the need to return to the ED if symptoms worsen or if there are any questions or concerns that arise at home.             Attending Attestation:           Physician Attestation for Scribe:  Physician Attestation Statement for Scribe #1: I, Syed Duff MD, reviewed documentation, as scribed by Lorri Vale in my presence, and it is both accurate and complete.           ED Course as of 11/13/22 0046   Sat Nov 12, 2022   1734 Patient resting comfortably without complaint at this time.  Have considered PE pneumothorax pulmonary embolism pneumonia anxiety chest wall pain muscle spasms and other conditions    First troponin is normal.  Patient did have a negative heart catheterization last year.  Will repeat  Troponin.  X-ray chest shows no acute abnormality   [PK]   1808 Signed out to Dr. Smith [PK]      ED Course User Index  [PK] Syed Duff MD                 Clinical Impression:   Final diagnoses:  [R07.9] Chest pain (Primary)        ED Disposition Condition    Discharge Stable          ED Prescriptions    None       Follow-up Information    None          Robert Smith MD  11/13/22 0046

## 2022-11-30 ENCOUNTER — OFFICE VISIT (OUTPATIENT)
Dept: FAMILY MEDICINE | Facility: CLINIC | Age: 42
End: 2022-11-30
Payer: OTHER GOVERNMENT

## 2022-11-30 VITALS
BODY MASS INDEX: 41.85 KG/M2 | TEMPERATURE: 99 F | DIASTOLIC BLOOD PRESSURE: 68 MMHG | HEART RATE: 78 BPM | OXYGEN SATURATION: 98 % | SYSTOLIC BLOOD PRESSURE: 116 MMHG | RESPIRATION RATE: 18 BRPM | HEIGHT: 72 IN | WEIGHT: 309 LBS

## 2022-11-30 DIAGNOSIS — J02.9 SORE THROAT: ICD-10-CM

## 2022-11-30 DIAGNOSIS — J06.9 UPPER RESPIRATORY TRACT INFECTION, UNSPECIFIED TYPE: Primary | ICD-10-CM

## 2022-11-30 DIAGNOSIS — J34.89 SINUS DRAINAGE: ICD-10-CM

## 2022-11-30 LAB
CTP QC/QA: YES
CTP QC/QA: YES
FLUAV AG NPH QL: NEGATIVE
FLUBV AG NPH QL: NEGATIVE
SARS-COV-2 AG RESP QL IA.RAPID: NEGATIVE

## 2022-11-30 PROCEDURE — 99213 PR OFFICE/OUTPT VISIT, EST, LEVL III, 20-29 MIN: ICD-10-PCS | Mod: 25,,, | Performed by: FAMILY MEDICINE

## 2022-11-30 PROCEDURE — 87804 INFLUENZA ASSAY W/OPTIC: CPT | Mod: QW,,, | Performed by: FAMILY MEDICINE

## 2022-11-30 PROCEDURE — 96372 PR INJECTION,THERAP/PROPH/DIAG2ST, IM OR SUBCUT: ICD-10-PCS | Mod: ,,, | Performed by: FAMILY MEDICINE

## 2022-11-30 PROCEDURE — 87426 SARS CORONAVIRUS 2 ANTIGEN POCT: ICD-10-PCS | Mod: QW,,, | Performed by: FAMILY MEDICINE

## 2022-11-30 PROCEDURE — 87804 POCT INFLUENZA A/B: ICD-10-PCS | Mod: 59,QW,, | Performed by: FAMILY MEDICINE

## 2022-11-30 PROCEDURE — 96372 THER/PROPH/DIAG INJ SC/IM: CPT | Mod: ,,, | Performed by: FAMILY MEDICINE

## 2022-11-30 PROCEDURE — 87426 SARSCOV CORONAVIRUS AG IA: CPT | Mod: QW,,, | Performed by: FAMILY MEDICINE

## 2022-11-30 PROCEDURE — 99213 OFFICE O/P EST LOW 20 MIN: CPT | Mod: 25,,, | Performed by: FAMILY MEDICINE

## 2022-11-30 RX ORDER — BETAMETHASONE SODIUM PHOSPHATE AND BETAMETHASONE ACETATE 3; 3 MG/ML; MG/ML
6 INJECTION, SUSPENSION INTRA-ARTICULAR; INTRALESIONAL; INTRAMUSCULAR; SOFT TISSUE
Status: COMPLETED | OUTPATIENT
Start: 2022-11-30 | End: 2022-11-30

## 2022-11-30 RX ORDER — CEFUROXIME AXETIL 500 MG/1
500 TABLET ORAL EVERY 12 HOURS
Qty: 20 TABLET | Refills: 0 | Status: SHIPPED | OUTPATIENT
Start: 2022-11-30 | End: 2023-04-02 | Stop reason: CLARIF

## 2022-11-30 RX ADMIN — BETAMETHASONE SODIUM PHOSPHATE AND BETAMETHASONE ACETATE 6 MG: 3; 3 INJECTION, SUSPENSION INTRA-ARTICULAR; INTRALESIONAL; INTRAMUSCULAR; SOFT TISSUE at 09:11

## 2022-11-30 NOTE — PROGRESS NOTES
Jigar Larson is a 42 y.o. male seen today for a 3 day history of nasal congestion postnasal drainage clear nasal discharge.  So far he has been afebrile with no nausea vomiting.  Patient's COVID and flu testing today were negative.      Past Medical History:   Diagnosis Date    GERD (gastroesophageal reflux disease)     Palpitations     Rheumatoid arthritis      Family History   Problem Relation Age of Onset    Allergies Brother      Current Outpatient Medications on File Prior to Visit   Medication Sig Dispense Refill    etanercept (ENBREL) 50 mg/mL (1 mL) injection Inject 50 mg into the skin once a week.      fluticasone propionate (FLONASE) 50 mcg/actuation nasal spray 1 spray by Each Nostril route 2 (two) times a day.      meloxicam (MOBIC) 7.5 MG tablet TAKE 1 TABLET BY MOUTH ONCE DAILY AS NEEDED FOR PAIN WITH FOOD OR MILK Strength: 7.5 mg 30 tablet 2    metoprolol succinate (TOPROL-XL) 25 MG 24 hr tablet Take 1 tablet (25 mg total) by mouth once daily. 90 tablet 2    pantoprazole (PROTONIX) 40 MG tablet Take 40 mg by mouth once daily.       No current facility-administered medications on file prior to visit.     Immunization History   Administered Date(s) Administered    COVID-19, MRNA, LN-S, PF (Pfizer) (Purple Cap) 04/09/2021, 04/30/2021    Influenza - Quadrivalent - PF *Preferred* (6 months and older) 10/14/2022       Review of Systems   Constitutional:  Negative for fever and malaise/fatigue.   HENT:  Positive for sore throat. Negative for congestion, ear discharge and ear pain.    Respiratory:  Negative for cough, shortness of breath and stridor.    Gastrointestinal:  Negative for abdominal pain, diarrhea and vomiting.   Musculoskeletal:  Negative for neck pain.   Neurological:  Negative for headaches.   Psychiatric/Behavioral:  Negative for depression.       Vitals:    11/30/22 0842   BP: 116/68   Pulse: 78   Resp: 18   Temp: 98.5 °F (36.9 °C)       Physical Exam  Vitals reviewed.   Constitutional:        Appearance: Normal appearance.   HENT:      Head: Normocephalic.      Nose:      Right Turbinates: Swollen.      Left Turbinates: Swollen.      Right Sinus: No maxillary sinus tenderness or frontal sinus tenderness.      Left Sinus: No maxillary sinus tenderness or frontal sinus tenderness.      Comments: Patient has copious clear postnasal drainage.  Eyes:      Extraocular Movements: Extraocular movements intact.      Conjunctiva/sclera: Conjunctivae normal.      Pupils: Pupils are equal, round, and reactive to light.   Neck:      Thyroid: No thyroid mass or thyromegaly.   Cardiovascular:      Rate and Rhythm: Normal rate and regular rhythm.      Heart sounds: Normal heart sounds. No murmur heard.    No gallop.   Pulmonary:      Effort: Pulmonary effort is normal. No respiratory distress.      Breath sounds: Normal breath sounds. No wheezing or rales.   Skin:     General: Skin is warm and dry.      Coloration: Skin is not jaundiced or pale.   Neurological:      Mental Status: He is alert.   Psychiatric:         Mood and Affect: Mood normal.         Behavior: Behavior normal.         Thought Content: Thought content normal.         Judgment: Judgment normal.        Assessment and Plan  Upper respiratory tract infection, unspecified type  -     cefUROXime (CEFTIN) 500 MG tablet; Take 1 tablet (500 mg total) by mouth every 12 (twelve) hours.  Dispense: 20 tablet; Refill: 0  -     betamethasone acetate-betamethasone sodium phosphate injection 6 mg    Sore throat  -     POCT Influenza A/B  -     SARS Coronavirus 2 Antigen, POCT    Sinus drainage  -     POCT Influenza A/B  -     SARS Coronavirus 2 Antigen, POCT            Return to clinic if symptoms worsen or persist.  I recommended the patient restart his Flonase initially 2 times daily for 3 days and then daily.  I also recommended over-the-counter Mucinex plain 1200 mg b.i.d. and increased water intake.  Patient did have a prescription for Ceftin printed if his  symptoms persist more than a week.    Health Maintenance Topics with due status: Not Due       Topic Last Completion Date    Lipid Panel 04/18/2022         Answers submitted by the patient for this visit:  Sore Throat Questionnaire (Submitted on 11/30/2022)  Chief Complaint: Sore throat  Chronicity: new  Onset: yesterday  Progression since onset: unchanged  Pain worse on: neither  Fever: no fever  Pain - numeric: 2/10  drooling: No  hoarse voice: No  plugged ear sensation: No  swollen glands: No  trouble swallowing: No  strep: No  mono: No  Treatments tried: nothing  Pain severity: mild

## 2023-04-02 ENCOUNTER — HOSPITAL ENCOUNTER (EMERGENCY)
Facility: HOSPITAL | Age: 43
Discharge: HOME | End: 2023-04-02
Payer: OTHER GOVERNMENT

## 2023-04-02 VITALS
TEMPERATURE: 99 F | RESPIRATION RATE: 14 BRPM | WEIGHT: 310 LBS | DIASTOLIC BLOOD PRESSURE: 117 MMHG | HEIGHT: 72 IN | HEART RATE: 72 BPM | BODY MASS INDEX: 41.99 KG/M2 | OXYGEN SATURATION: 96 % | SYSTOLIC BLOOD PRESSURE: 137 MMHG

## 2023-04-02 DIAGNOSIS — R07.9 CHEST PAIN: ICD-10-CM

## 2023-04-02 DIAGNOSIS — R00.2 PALPITATIONS: ICD-10-CM

## 2023-04-02 LAB
ANION GAP SERPL CALCULATED.3IONS-SCNC: 12 MMOL/L (ref 7–16)
APTT PPP: 27.3 SECONDS (ref 25.2–37.3)
BASOPHILS # BLD AUTO: 0.03 K/UL (ref 0–0.2)
BASOPHILS NFR BLD AUTO: 0.4 % (ref 0–1)
BUN SERPL-MCNC: 14 MG/DL (ref 7–18)
BUN/CREAT SERPL: 12 (ref 6–20)
CALCIUM SERPL-MCNC: 8.8 MG/DL (ref 8.5–10.1)
CHLORIDE SERPL-SCNC: 106 MMOL/L (ref 98–107)
CO2 SERPL-SCNC: 26 MMOL/L (ref 21–32)
CREAT SERPL-MCNC: 1.16 MG/DL (ref 0.7–1.3)
D DIMER PPP FEU-MCNC: <0.27 ΜG/ML (ref 0–0.47)
DIFFERENTIAL METHOD BLD: ABNORMAL
EGFR (NO RACE VARIABLE) (RUSH/TITUS): 80 ML/MIN/1.73M²
EOSINOPHIL # BLD AUTO: 0.08 K/UL (ref 0–0.5)
EOSINOPHIL NFR BLD AUTO: 1 % (ref 1–4)
ERYTHROCYTE [DISTWIDTH] IN BLOOD BY AUTOMATED COUNT: 13.8 % (ref 11.5–14.5)
GLUCOSE SERPL-MCNC: 150 MG/DL (ref 74–106)
HCT VFR BLD AUTO: 42.2 % (ref 40–54)
HGB BLD-MCNC: 13.5 G/DL (ref 13.5–18)
IMM GRANULOCYTES # BLD AUTO: 0.03 K/UL (ref 0–0.04)
IMM GRANULOCYTES NFR BLD: 0.4 % (ref 0–0.4)
INR BLD: 1.03
LYMPHOCYTES # BLD AUTO: 1.96 K/UL (ref 1–4.8)
LYMPHOCYTES NFR BLD AUTO: 23.9 % (ref 27–41)
MAGNESIUM SERPL-MCNC: 2 MG/DL (ref 1.7–2.3)
MCH RBC QN AUTO: 26.3 PG (ref 27–31)
MCHC RBC AUTO-ENTMCNC: 32 G/DL (ref 32–36)
MCV RBC AUTO: 82.1 FL (ref 80–96)
MONOCYTES # BLD AUTO: 0.36 K/UL (ref 0–0.8)
MONOCYTES NFR BLD AUTO: 4.4 % (ref 2–6)
MPC BLD CALC-MCNC: 9.7 FL (ref 9.4–12.4)
NEUTROPHILS # BLD AUTO: 5.74 K/UL (ref 1.8–7.7)
NEUTROPHILS NFR BLD AUTO: 69.9 % (ref 53–65)
NRBC # BLD AUTO: 0 X10E3/UL
NRBC, AUTO (.00): 0 %
PLATELET # BLD AUTO: 253 K/UL (ref 150–400)
POTASSIUM SERPL-SCNC: 4 MMOL/L (ref 3.5–5.1)
PROTHROMBIN TIME: 13.1 SECONDS (ref 11.7–14.7)
RBC # BLD AUTO: 5.14 M/UL (ref 4.6–6.2)
SODIUM SERPL-SCNC: 140 MMOL/L (ref 136–145)
TROPONIN I SERPL HS-MCNC: 4.8 PG/ML
WBC # BLD AUTO: 8.2 K/UL (ref 4.5–11)

## 2023-04-02 PROCEDURE — 93005 ELECTROCARDIOGRAM TRACING: CPT

## 2023-04-02 PROCEDURE — 93010 ELECTROCARDIOGRAM REPORT: CPT | Mod: ,,, | Performed by: HOSPITALIST

## 2023-04-02 PROCEDURE — 85379 FIBRIN DEGRADATION QUANT: CPT | Performed by: NURSE PRACTITIONER

## 2023-04-02 PROCEDURE — 84484 ASSAY OF TROPONIN QUANT: CPT | Performed by: NURSE PRACTITIONER

## 2023-04-02 PROCEDURE — 99285 EMERGENCY DEPT VISIT HI MDM: CPT | Mod: 25

## 2023-04-02 PROCEDURE — 85610 PROTHROMBIN TIME: CPT | Performed by: NURSE PRACTITIONER

## 2023-04-02 PROCEDURE — 85025 COMPLETE CBC W/AUTO DIFF WBC: CPT | Performed by: NURSE PRACTITIONER

## 2023-04-02 PROCEDURE — 93010 EKG 12-LEAD: ICD-10-PCS | Mod: ,,, | Performed by: HOSPITALIST

## 2023-04-02 PROCEDURE — 99283 PR EMERGENCY DEPT VISIT,LEVEL III: ICD-10-PCS | Mod: ,,, | Performed by: NURSE PRACTITIONER

## 2023-04-02 PROCEDURE — 99283 EMERGENCY DEPT VISIT LOW MDM: CPT | Mod: ,,, | Performed by: NURSE PRACTITIONER

## 2023-04-02 PROCEDURE — 83735 ASSAY OF MAGNESIUM: CPT | Performed by: NURSE PRACTITIONER

## 2023-04-02 PROCEDURE — 80048 BASIC METABOLIC PNL TOTAL CA: CPT | Performed by: NURSE PRACTITIONER

## 2023-04-02 PROCEDURE — 85730 THROMBOPLASTIN TIME PARTIAL: CPT | Performed by: NURSE PRACTITIONER

## 2023-04-02 NOTE — DISCHARGE INSTRUCTIONS
Follow up with cardiology. Follow up with your primary care provider in 2 days. Return to the emergency department for any increase in symptoms or for any other new or worrisome symptoms.

## 2023-04-02 NOTE — ED TRIAGE NOTES
Patient presents to ER with chief complaint of left arm tingling for the past multiple days and waves of palpitations for the past two days. Patient denies any SOB, nausea, vomiting, or fever. Patient has a history of a-fib and hypertension. Upon arrival patient is AAOx4, ambulatory, vitally stable, and showing no signs of acute distress.

## 2023-04-02 NOTE — ED PROVIDER NOTES
Encounter Date: 4/2/2023       History     Chief Complaint   Patient presents with    Tingling     Patient states tingling in left arm for multiple days    Palpitations     Waves of palpitations for the past two days     42-year-old male presents to the emergency department to be evaluated because he has felt like his heart pounds at times over the last 2 days.  Denies any complaints at this time.    The history is provided by the patient.   Palpitations   This is a new problem. The current episode started two days ago. Associated symptoms include irregular heartbeat. Pertinent negatives include no fever, no malaise/fatigue, no numbness, no chest pain, no chest pressure, no claudication, no exertional chest pressure, no near-syncope, no orthopnea, no PND, no syncope, no abdominal pain, no nausea, no vomiting, no headaches, no back pain, no leg pain, no lower extremity edema, no dizziness, no weakness, no cough, no hemoptysis, no shortness of breath and no sputum production.   Review of patient's allergies indicates:  No Known Allergies  Past Medical History:   Diagnosis Date    GERD (gastroesophageal reflux disease)     Palpitations     Rheumatoid arthritis      History reviewed. No pertinent surgical history.  Family History   Problem Relation Age of Onset    Allergies Brother      Social History     Tobacco Use    Smoking status: Never    Smokeless tobacco: Never   Substance Use Topics    Alcohol use: Never    Drug use: Never     Review of Systems   Constitutional:  Negative for chills, fever and malaise/fatigue.   Respiratory:  Negative for cough, hemoptysis, sputum production and shortness of breath.    Cardiovascular:  Positive for palpitations. Negative for chest pain, orthopnea, claudication, syncope, PND and near-syncope.   Gastrointestinal:  Negative for abdominal pain, diarrhea, nausea and vomiting.   Musculoskeletal:  Negative for back pain.   Neurological:  Negative for dizziness, weakness, numbness and  headaches.   All other systems reviewed and are negative.    Physical Exam     Initial Vitals   BP Pulse Resp Temp SpO2   04/02/23 1317 04/02/23 1317 04/02/23 1317 04/02/23 1329 04/02/23 1317   (!) 152/87 72 18 99 °F (37.2 °C) 99 %      MAP       --                Physical Exam    Vitals reviewed.  Constitutional: He appears well-developed and well-nourished.   Neck: Neck supple.   Cardiovascular:  Normal rate and regular rhythm.           Pulmonary/Chest: Breath sounds normal.   Abdominal: Abdomen is soft. Bowel sounds are normal. He exhibits no distension and no mass. There is no abdominal tenderness. There is no rebound and no guarding.   Musculoskeletal:         General: Normal range of motion.      Cervical back: Neck supple.     Neurological: He is alert and oriented to person, place, and time. He has normal strength. GCS score is 15. GCS eye subscore is 4. GCS verbal subscore is 5. GCS motor subscore is 6.   Skin: Skin is warm and dry. Capillary refill takes less than 2 seconds.   Psychiatric: He has a normal mood and affect.       Medical Screening Exam   See Full Note    ED Course   Procedures  Labs Reviewed   BASIC METABOLIC PANEL - Abnormal; Notable for the following components:       Result Value    Glucose 150 (*)     All other components within normal limits   CBC WITH DIFFERENTIAL - Abnormal; Notable for the following components:    MCH 26.3 (*)     Neutrophils % 69.9 (*)     Lymphocytes % 23.9 (*)     All other components within normal limits   PROTIME-INR - Normal   APTT - Normal   MAGNESIUM - Normal   TROPONIN I - Normal   D DIMER, QUANTITATIVE - Normal   CBC W/ AUTO DIFFERENTIAL    Narrative:     The following orders were created for panel order CBC Auto Differential.  Procedure                               Abnormality         Status                     ---------                               -----------         ------                     CBC with Differential[[id number]]        Abnormal             Final result                 Please view results for these tests on the individual orders.     EKG Readings: (Independently Interpreted)   Initial Reading: No STEMI. Rhythm: Normal Sinus Rhythm. Heart Rate: 76.     Imaging Results              X-Ray Chest 1 View (Final result)  Result time 23 14:24:41      Final result by Vladimir Muir II, MD (23 14:24:41)                   Impression:      No evidence of cardiopulmonary disease.      Electronically signed by: Vladimir Muir  Date:    2023  Time:    14:24               Narrative:    EXAMINATION:  XR CHEST 1 VIEW    CLINICAL HISTORY:  Chest pain, unspecified    COMPARISON:  Twelve 2022    TECHNIQUE:  XR CHEST 1 VIEW    FINDINGS:  The heart and mediastinum are normal in size and configuration.  The pulmonary vascularity is normal in caliber.  No lung infiltrates, effusions, pneumothorax or other abnormality is demonstrated.                                       Medications - No data to display  Medical Decision Makin-year-old male presents to the emergency department to be evaluated because he has felt like his heart pounds at times over the last 2 days.  Denies any complaints at this time.  I ordered labs and personally reviewed them.    I ordered X-rays and personally reviewed them and reviewed the radiologist interpretation.  Xray significant for no acute process.    I ordered EKG and personally reviewed it.  EKG significant for normal sinus rhythm.    Diagnosis: palpitations  Patient was discharged in stable condition.  Detailed return precautions discussed.                   Clinical Impression:   Final diagnoses:  [R07.9] Chest pain  [R00.2] Palpitations        ED Disposition Condition    Discharge Stable          ED Prescriptions    None       Follow-up Information    None          ROXANNE López  [phone]

## 2023-04-04 ENCOUNTER — PATIENT MESSAGE (OUTPATIENT)
Dept: CARDIOLOGY | Facility: CLINIC | Age: 43
End: 2023-04-04
Payer: OTHER GOVERNMENT

## 2023-04-04 ENCOUNTER — OFFICE VISIT (OUTPATIENT)
Dept: CARDIOLOGY | Facility: CLINIC | Age: 43
End: 2023-04-04
Payer: OTHER GOVERNMENT

## 2023-04-04 VITALS
BODY MASS INDEX: 42.58 KG/M2 | SYSTOLIC BLOOD PRESSURE: 116 MMHG | DIASTOLIC BLOOD PRESSURE: 82 MMHG | WEIGHT: 314.38 LBS | HEART RATE: 84 BPM | OXYGEN SATURATION: 98 % | HEIGHT: 72 IN

## 2023-04-04 DIAGNOSIS — R00.2 PALPITATIONS: Primary | ICD-10-CM

## 2023-04-04 DIAGNOSIS — Z86.79 HISTORY OF ATRIAL FIBRILLATION: ICD-10-CM

## 2023-04-04 DIAGNOSIS — I47.29 NSVT (NONSUSTAINED VENTRICULAR TACHYCARDIA): ICD-10-CM

## 2023-04-04 PROCEDURE — 99213 OFFICE O/P EST LOW 20 MIN: CPT | Mod: PBBFAC | Performed by: NURSE PRACTITIONER

## 2023-04-04 PROCEDURE — 93246 EXT ECG>7D<15D RECORDING: CPT | Performed by: NURSE PRACTITIONER

## 2023-04-04 PROCEDURE — 99213 PR OFFICE/OUTPT VISIT, EST, LEVL III, 20-29 MIN: ICD-10-PCS | Mod: S$PBB,,, | Performed by: NURSE PRACTITIONER

## 2023-04-04 PROCEDURE — 93010 EKG 12-LEAD: ICD-10-PCS | Mod: S$PBB,,, | Performed by: STUDENT IN AN ORGANIZED HEALTH CARE EDUCATION/TRAINING PROGRAM

## 2023-04-04 PROCEDURE — 99213 OFFICE O/P EST LOW 20 MIN: CPT | Mod: S$PBB,,, | Performed by: NURSE PRACTITIONER

## 2023-04-04 PROCEDURE — 93010 ELECTROCARDIOGRAM REPORT: CPT | Mod: S$PBB,,, | Performed by: STUDENT IN AN ORGANIZED HEALTH CARE EDUCATION/TRAINING PROGRAM

## 2023-04-04 PROCEDURE — 93005 ELECTROCARDIOGRAM TRACING: CPT | Mod: PBBFAC | Performed by: STUDENT IN AN ORGANIZED HEALTH CARE EDUCATION/TRAINING PROGRAM

## 2023-04-04 NOTE — ASSESSMENT & PLAN NOTE
Single episode during ETT  Echo 8/2020 normal  LHC 8/2020 normal  Reports hx of atrial fibrillation 2010

## 2023-04-04 NOTE — PROGRESS NOTES
"PCP: Rasta Carmichael MD    Referring Provider:     Subjective:   Jigar Larson is a 42 y.o. male with hx of atrial fibrillation in 2010, sleep apnea compliant with cpap who presents for 1 year follow up, previous pt of Dr. Aguirre.     Patient reports having more frequent episodes of palpitations, went to ED Sunday 4/2/2023 and workup was negative according to him. ER records and labs reviewed CBC and CMP normal, TSH normal on 11/12/2022. EKG NSR.      Patient with c/o more frequent episodes of palpitations with sob and chest tightness. States his heart rate gets as high as 160s on his apple watch but doesn't usually last very long. He has completely stopped drinking coffee and has been drinking about 64oz of water daily.     Reports there are no aggravating or alleviating factors related to these episodes but admits that when he start "feeling funny" he becomes nervous/anxious and is scared he is going to drop dead. Concerned it may possibly be panic attacks.      I had Dr. Lacy review cath films today, he feels no further ischemic evaluation is warranted at this time.         Fhx: Family history negative for sudden death.  Shx: Nonsmoker, no alcohol or drug abuse    EKG 4/4/2023: NSR with T wave inversion in III & aVF             11/8/22: NSR with T wave inversion in III & aVF, no changes when compared to previous EKG 11/8/2021 11/8/21:  Normal sinus rhythm.  24 Holter 8/4/2020:  Maximum heart rate of 137 bpm was sinus tachycardia. Short run of nonsustained ventricular tachycardia, 5 beats.                              Overall PVC burden is less than 1%  ECHO 8/4/2020: Normal left ventricular cavity with overall normal systolic function. LVEF 55-60%.                              No significant valvular abnormalities were noted.   LHC 8/25/2020:  Nonobstructive coronary artery disease.                             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      Lab Results "   Component Value Date     04/02/2023    K 4.0 04/02/2023     04/02/2023    CO2 26 04/02/2023    BUN 14 04/02/2023    CREATININE 1.16 04/02/2023    CALCIUM 8.8 04/02/2023    ANIONGAP 12 04/02/2023       Lab Results   Component Value Date    CHOL 162 04/18/2022     Lab Results   Component Value Date    HDL 45 04/18/2022     Lab Results   Component Value Date    LDLCALC 95 04/18/2022     Lab Results   Component Value Date    TRIG 109 04/18/2022     Lab Results   Component Value Date    CHOLHDL 3.6 04/18/2022       Lab Results   Component Value Date    WBC 8.20 04/02/2023    HGB 13.5 04/02/2023    HCT 42.2 04/02/2023    MCV 82.1 04/02/2023     04/02/2023           Current Outpatient Medications:     etanercept (ENBREL) 50 mg/mL (1 mL) injection, Inject 50 mg into the skin once a week., Disp: , Rfl:     metoprolol succinate (TOPROL-XL) 25 MG 24 hr tablet, Take 1 tablet (25 mg total) by mouth once daily., Disp: 90 tablet, Rfl: 2    pantoprazole (PROTONIX) 40 MG tablet, Take 40 mg by mouth once daily., Disp: , Rfl:     Review of Systems   Constitutional:  Negative for chills, fever and malaise/fatigue.   HENT: Negative.     Eyes: Negative.    Respiratory:  Positive for shortness of breath.    Cardiovascular:  Positive for chest pain and palpitations. Negative for orthopnea and leg swelling.   Gastrointestinal: Negative.    Musculoskeletal: Negative.    Skin: Negative.    Neurological:  Negative for dizziness, loss of consciousness and headaches.   Psychiatric/Behavioral:  The patient is nervous/anxious.        Objective:   /82 (BP Location: Left arm, Patient Position: Sitting)   Pulse 84   Ht 6' (1.829 m)   Wt (!) 142.6 kg (314 lb 6.4 oz)   SpO2 98%   BMI 42.64 kg/m²     Physical Exam  Vitals reviewed.   Constitutional:       Appearance: He is obese.   HENT:      Nose: Nose normal.      Mouth/Throat:      Mouth: Mucous membranes are moist.      Pharynx: Oropharynx is clear.   Eyes:       General: No scleral icterus.     Pupils: Pupils are equal, round, and reactive to light.   Neck:      Vascular: No carotid bruit.   Cardiovascular:      Rate and Rhythm: Normal rate and regular rhythm.      Pulses: Normal pulses.      Heart sounds: Normal heart sounds.   Pulmonary:      Effort: Pulmonary effort is normal.      Breath sounds: Normal breath sounds. No wheezing, rhonchi or rales.   Abdominal:      General: Bowel sounds are normal.      Palpations: Abdomen is soft.   Musculoskeletal:      Cervical back: Neck supple.      Right lower leg: No edema.      Left lower leg: No edema.   Skin:     General: Skin is warm and dry.      Coloration: Skin is not jaundiced or pale.   Neurological:      Mental Status: He is alert and oriented to person, place, and time.   Psychiatric:         Mood and Affect: Mood normal.         Behavior: Behavior normal.       Assessment:     1. Palpitations        2. NSVT (nonsustained ventricular tachycardia)        3. History of atrial fibrillation              Plan:   NSVT (nonsustained ventricular tachycardia)  Single episode during ETT  Echo 8/2020 normal  LHC 8/2020 normal  Reports hx of atrial fibrillation 2010    History of atrial fibrillation  Reports hx of afib 2010    Palpitations  Increased episodes of palipations  EKG today NSR with T wave inversion in III & aVF, no significant changes when compared to previous  Continue Toprol XL 25mg daily  ZIO monitor placed today, follow up with me in 4 weeks for results  Once a few episodes have been recorded, increase Toprol XL to 50mg daily  If cardiac monitoring without significant findings, would recommend follow up with psych/PCP to initiate medication for possible panic attacks    Follow up in 4 weeks for ZIO results, or sooner if needed

## 2023-04-04 NOTE — ASSESSMENT & PLAN NOTE
Increased episodes of palipations  EKG today NSR with T wave inversion in III & aVF, no significant changes when compared to previous  Continue Toprol XL 25mg daily  ZIO monitor placed today, follow up with me in 4 weeks for results  Once a few episodes have been recorded, increase Toprol XL to 50mg daily  If cardiac monitoring without significant findings, would recommend follow up with psych/PCP to initiate medication for possible panic attacks

## 2023-04-05 DIAGNOSIS — Z86.79 HISTORY OF ATRIAL FIBRILLATION: ICD-10-CM

## 2023-04-05 DIAGNOSIS — I47.29 NSVT (NONSUSTAINED VENTRICULAR TACHYCARDIA): Primary | ICD-10-CM

## 2023-04-05 DIAGNOSIS — R00.2 PALPITATIONS: ICD-10-CM

## 2023-04-13 ENCOUNTER — HOSPITAL ENCOUNTER (EMERGENCY)
Facility: HOSPITAL | Age: 43
Discharge: HOME | End: 2023-04-13
Payer: COMMERCIAL

## 2023-04-13 VITALS
RESPIRATION RATE: 18 BRPM | TEMPERATURE: 98 F | SYSTOLIC BLOOD PRESSURE: 159 MMHG | WEIGHT: 314 LBS | DIASTOLIC BLOOD PRESSURE: 81 MMHG | HEART RATE: 104 BPM | OXYGEN SATURATION: 98 % | BODY MASS INDEX: 42.53 KG/M2 | HEIGHT: 72 IN

## 2023-04-13 DIAGNOSIS — Z12.11 COLON CANCER SCREENING: Primary | ICD-10-CM

## 2023-04-13 DIAGNOSIS — R00.2 PALPITATION: Primary | ICD-10-CM

## 2023-04-13 DIAGNOSIS — R07.9 CHEST PAIN: ICD-10-CM

## 2023-04-13 LAB
ALBUMIN SERPL BCP-MCNC: 4 G/DL (ref 3.5–5)
ALBUMIN/GLOB SERPL: 1.2 {RATIO}
ALP SERPL-CCNC: 121 U/L (ref 45–115)
ALT SERPL W P-5'-P-CCNC: 58 U/L (ref 16–61)
ANION GAP SERPL CALCULATED.3IONS-SCNC: 13 MMOL/L (ref 7–16)
AST SERPL W P-5'-P-CCNC: 24 U/L (ref 15–37)
BASOPHILS # BLD AUTO: 0.03 K/UL (ref 0–0.2)
BASOPHILS NFR BLD AUTO: 0.3 % (ref 0–1)
BILIRUB SERPL-MCNC: 0.3 MG/DL (ref ?–1.2)
BUN SERPL-MCNC: 16 MG/DL (ref 7–18)
BUN/CREAT SERPL: 13 (ref 6–20)
CALCIUM SERPL-MCNC: 8.8 MG/DL (ref 8.5–10.1)
CHLORIDE SERPL-SCNC: 103 MMOL/L (ref 98–107)
CO2 SERPL-SCNC: 26 MMOL/L (ref 21–32)
CREAT SERPL-MCNC: 1.2 MG/DL (ref 0.7–1.3)
D DIMER PPP FEU-MCNC: 0.31 ΜG/ML (ref 0–0.47)
DIFFERENTIAL METHOD BLD: ABNORMAL
EGFR (NO RACE VARIABLE) (RUSH/TITUS): 76 ML/MIN/1.73M²
EOSINOPHIL # BLD AUTO: 0.07 K/UL (ref 0–0.5)
EOSINOPHIL NFR BLD AUTO: 0.7 % (ref 1–4)
ERYTHROCYTE [DISTWIDTH] IN BLOOD BY AUTOMATED COUNT: 13.8 % (ref 11.5–14.5)
GLOBULIN SER-MCNC: 3.4 G/DL (ref 2–4)
GLUCOSE SERPL-MCNC: 208 MG/DL (ref 74–106)
HCT VFR BLD AUTO: 43.1 % (ref 40–54)
HGB BLD-MCNC: 14 G/DL (ref 13.5–18)
IMM GRANULOCYTES # BLD AUTO: 0.04 K/UL (ref 0–0.04)
IMM GRANULOCYTES NFR BLD: 0.4 % (ref 0–0.4)
LYMPHOCYTES # BLD AUTO: 1.59 K/UL (ref 1–4.8)
LYMPHOCYTES NFR BLD AUTO: 16.7 % (ref 27–41)
MCH RBC QN AUTO: 26.5 PG (ref 27–31)
MCHC RBC AUTO-ENTMCNC: 32.5 G/DL (ref 32–36)
MCV RBC AUTO: 81.6 FL (ref 80–96)
MONOCYTES # BLD AUTO: 0.45 K/UL (ref 0–0.8)
MONOCYTES NFR BLD AUTO: 4.7 % (ref 2–6)
MPC BLD CALC-MCNC: 9.8 FL (ref 9.4–12.4)
NEUTROPHILS # BLD AUTO: 7.32 K/UL (ref 1.8–7.7)
NEUTROPHILS NFR BLD AUTO: 77.2 % (ref 53–65)
NRBC # BLD AUTO: 0 X10E3/UL
NRBC, AUTO (.00): 0 %
NT-PROBNP SERPL-MCNC: 28 PG/ML (ref 1–125)
PLATELET # BLD AUTO: 256 K/UL (ref 150–400)
POTASSIUM SERPL-SCNC: 3.8 MMOL/L (ref 3.5–5.1)
PROT SERPL-MCNC: 7.4 G/DL (ref 6.4–8.2)
RBC # BLD AUTO: 5.28 M/UL (ref 4.6–6.2)
SODIUM SERPL-SCNC: 138 MMOL/L (ref 136–145)
TROPONIN I SERPL HS-MCNC: 7.9 PG/ML
WBC # BLD AUTO: 9.5 K/UL (ref 4.5–11)

## 2023-04-13 PROCEDURE — 99284 EMERGENCY DEPT VISIT MOD MDM: CPT | Mod: ,,, | Performed by: NURSE PRACTITIONER

## 2023-04-13 PROCEDURE — 93010 EKG 12-LEAD: ICD-10-PCS | Mod: ,,, | Performed by: STUDENT IN AN ORGANIZED HEALTH CARE EDUCATION/TRAINING PROGRAM

## 2023-04-13 PROCEDURE — 93005 ELECTROCARDIOGRAM TRACING: CPT

## 2023-04-13 PROCEDURE — 80053 COMPREHEN METABOLIC PANEL: CPT | Performed by: NURSE PRACTITIONER

## 2023-04-13 PROCEDURE — 99284 PR EMERGENCY DEPT VISIT,LEVEL IV: ICD-10-PCS | Mod: ,,, | Performed by: NURSE PRACTITIONER

## 2023-04-13 PROCEDURE — 84484 ASSAY OF TROPONIN QUANT: CPT | Performed by: NURSE PRACTITIONER

## 2023-04-13 PROCEDURE — 83880 ASSAY OF NATRIURETIC PEPTIDE: CPT | Performed by: NURSE PRACTITIONER

## 2023-04-13 PROCEDURE — 99285 EMERGENCY DEPT VISIT HI MDM: CPT | Mod: 25

## 2023-04-13 PROCEDURE — 85379 FIBRIN DEGRADATION QUANT: CPT | Performed by: NURSE PRACTITIONER

## 2023-04-13 PROCEDURE — 93010 ELECTROCARDIOGRAM REPORT: CPT | Mod: ,,, | Performed by: STUDENT IN AN ORGANIZED HEALTH CARE EDUCATION/TRAINING PROGRAM

## 2023-04-13 PROCEDURE — 85025 COMPLETE CBC W/AUTO DIFF WBC: CPT | Performed by: NURSE PRACTITIONER

## 2023-04-13 RX ORDER — HYDROXYZINE PAMOATE 25 MG/1
25 CAPSULE ORAL EVERY 8 HOURS PRN
Qty: 90 CAPSULE | Refills: 0 | Status: SHIPPED | OUTPATIENT
Start: 2023-04-13 | End: 2023-05-13

## 2023-04-13 NOTE — ED TRIAGE NOTES
Was in the ED 2 weeks ago for heart racing and tingling all over. Went to his cardiologist after Ed visit as recommended and they placed him on a halter monitor for 14 days. Was coming home from [place] today and states his heart rate jumped up into 120's driving with the tingling all over. States they arent sure if he is having anxiety or heart related issues

## 2023-04-13 NOTE — ED PROVIDER NOTES
Encounter Date: 4/13/2023       History     Chief Complaint   Patient presents with    Palpitations     42 year old male presents to ED with complaint of palpitations and tingling of hands/fingers. Patient states he was [place] [facility] [place], and started having palpitations and elevation of his heart rate into 130s-160s. Patient states he called his spouse to help calm him down and he was able to get his heart rate down into lower 100s. He states he was having to take slow deep breaths that were difficult to take, otherwise denies shortness of breath. Endorses some weakness with episode and dizziness. Denies nausea/vomiting.    The history is provided by the patient. No  was used.   Palpitations   This is a recurrent problem. The current episode started today. The problem has been resolved. The problem is associated with an unknown factor. Associated symptoms include irregular heartbeat, dizziness and weakness. He has tried breathing exercises for the symptoms. The treatment provided mild relief. Risk factors include being male.   Review of patient's allergies indicates:  No Known Allergies  Past Medical History:   Diagnosis Date    GERD (gastroesophageal reflux disease)     Palpitations     Rheumatoid arthritis      History reviewed. No pertinent surgical history.  Family History   Problem Relation Age of Onset    Lung cancer Mother     Allergies Brother      Social History     Tobacco Use    Smoking status: Never    Smokeless tobacco: Never   Substance Use Topics    Alcohol use: Never    Drug use: Never     Review of Systems   Cardiovascular:  Positive for palpitations.   Neurological:  Positive for dizziness and weakness.     Physical Exam     Initial Vitals [04/13/23 1656]   BP Pulse Resp Temp SpO2   (!) 159/81 104 18 98.1 °F (36.7 °C) 98 %      MAP       --         Physical Exam    Medical Screening Exam   See Full Note    ED Course   Procedures  Labs Reviewed    COMPREHENSIVE METABOLIC PANEL - Abnormal; Notable for the following components:       Result Value    Glucose 208 (*)     Alk Phos 121 (*)     All other components within normal limits   CBC WITH DIFFERENTIAL - Abnormal; Notable for the following components:    MCH 26.5 (*)     Neutrophils % 77.2 (*)     Lymphocytes % 16.7 (*)     Eosinophils % 0.7 (*)     All other components within normal limits   TROPONIN I - Normal   NT-PRO NATRIURETIC PEPTIDE - Normal   D DIMER, QUANTITATIVE - Normal   CBC W/ AUTO DIFFERENTIAL    Narrative:     The following orders were created for panel order CBC auto differential.  Procedure                               Abnormality         Status                     ---------                               -----------         ------                     CBC with Differential[[id number]]        Abnormal            Final result                 Please view results for these tests on the individual orders.          Imaging Results              X-Ray Chest PA And Lateral (Final result)  Result time 04/13/23 18:25:50      Final result by Loco Escobedo DO (04/13/23 18:25:50)                   Impression:      No acute cardiopulmonary process demonstrated.    Point of Service: [facility]      Electronically signed by: Loco Escobedo  Date:    04/13/2023  Time:    18:25               Narrative:    EXAMINATION:  XR CHEST PA AND LATERAL    CLINICAL HISTORY:  Chest Pain;    COMPARISON:  Chest x-ray April 2, 2023    TECHNIQUE:  Frontal and lateral views of the chest.    FINDINGS:  Electronic device projects over the left chest ventrally.  Heart size appears within normal limits.  No focal consolidation, pleural effusion, or pneumothorax.  Visualized osseous and surrounding soft tissue structures demonstrate no acute abnormality.                                       Medications - No data to display  Medical Decision Making:   Initial Assessment:   Palpitations    Differential Diagnosis:    Palpitations  anxiety  Clinical Tests:   Lab Tests: Ordered and Reviewed  Radiological Study: Ordered and Reviewed  Medical Tests: Ordered and Reviewed  ED Management:  [facility]    Patient presents for emergent evaluation of acute palpitation that poses a threat to life and/or bodily function.    In the ED patient found to have acute chest pain, palpitations.    I ordered labs and personally reviewed them.  Labs significant for glucose 208; remaining labs unremarkable  I ordered X-rays and personally reviewed them and reviewed the radiologist interpretation.  Xray significant for no acute processes.    I ordered EKG and personally reviewed it.  EKG significant for sinus tachycardia; rate 106.      Discharge MDM  Patient was discharged in stable condition.  Detailed return precautions discussed.                   Clinical Impression:   Final diagnoses:  [R07.9] Chest pain  [R00.2] Palpitation (Primary)        ED Disposition Condition    Discharge Stable          ED Prescriptions       Medication Sig Dispense Start Date End Date Auth. Provider    hydrOXYzine pamoate (VISTARIL) 25 MG Cap Take 1 capsule (25 mg total) by mouth every 8 (eight) hours as needed (anxiety). 90 capsule 4/13/2023 5/13/2023 ROXANNE Hermosillo          Follow-up Information    None          ROXANNE Hermosillo  04/13/23 1958

## 2023-04-14 DIAGNOSIS — Z12.11 COLON CANCER SCREENING: Primary | ICD-10-CM

## 2023-04-14 RX ORDER — POLYETHYLENE GLYCOL 3350, SODIUM SULFATE ANHYDROUS, SODIUM BICARBONATE, SODIUM CHLORIDE, POTASSIUM CHLORIDE 236; 22.74; 6.74; 5.86; 2.97 G/4L; G/4L; G/4L; G/4L; G/4L
4 POWDER, FOR SOLUTION ORAL ONCE
Qty: 4000 ML | Refills: 0 | Status: SHIPPED | OUTPATIENT
Start: 2023-04-14 | End: 2023-04-14

## 2023-04-14 NOTE — TELEPHONE ENCOUNTER
----- Message from Chiquita Diggs sent at 4/13/2023  2:49 PM CDT -----  Regarding: Colon prep  Pt needs prep sent to [facility] on [address] in [facility]

## 2023-04-15 ENCOUNTER — HOSPITAL ENCOUNTER (EMERGENCY)
Facility: HOSPITAL | Age: 43
Discharge: HOME | End: 2023-04-15
Attending: EMERGENCY MEDICINE
Payer: COMMERCIAL

## 2023-04-15 VITALS
WEIGHT: 311 LBS | SYSTOLIC BLOOD PRESSURE: 141 MMHG | TEMPERATURE: 99 F | OXYGEN SATURATION: 99 % | BODY MASS INDEX: 42.18 KG/M2 | HEART RATE: 83 BPM | DIASTOLIC BLOOD PRESSURE: 85 MMHG | RESPIRATION RATE: 16 BRPM

## 2023-04-15 DIAGNOSIS — F41.9 ANXIETY: Primary | ICD-10-CM

## 2023-04-15 DIAGNOSIS — R00.2 PALPITATIONS: ICD-10-CM

## 2023-04-15 PROCEDURE — 93005 ELECTROCARDIOGRAM TRACING: CPT

## 2023-04-15 PROCEDURE — 93010 ELECTROCARDIOGRAM REPORT: CPT | Mod: ,,, | Performed by: INTERNAL MEDICINE

## 2023-04-15 PROCEDURE — 99284 PR EMERGENCY DEPT VISIT,LEVEL IV: ICD-10-PCS | Mod: ,,, | Performed by: EMERGENCY MEDICINE

## 2023-04-15 PROCEDURE — 99284 EMERGENCY DEPT VISIT MOD MDM: CPT | Mod: ,,, | Performed by: EMERGENCY MEDICINE

## 2023-04-15 PROCEDURE — 99283 EMERGENCY DEPT VISIT LOW MDM: CPT

## 2023-04-15 PROCEDURE — 93010 EKG 12-LEAD: ICD-10-PCS | Mod: ,,, | Performed by: INTERNAL MEDICINE

## 2023-04-15 NOTE — DISCHARGE INSTRUCTIONS
Continue medication as prescribed.  Follow up with Cardiology as previously scheduled.  Return to emergency department for any worsening or further problems.

## 2023-04-15 NOTE — ED PROVIDER NOTES
Encounter Date: 4/15/2023       History     Chief Complaint   Patient presents with    Anxiety     Ems from home - c/o heart racing and anxious - wearing halter monitor - hx of same     Patient is a 43-year-old male with history of palpitations and elevated heart rate.  Patient presents to the emergency department complaining that earlier this evening his heart began racing again with heart rate of around 140.  Patient states his blood pressure also read high at this time and that he developed chest tightness and shortness of breath.  Since that time symptoms have resolved.  Patient has otherwise been feeling well with no fever, cough, vomiting, diarrhea, increased pain or swelling in legs, or other acute complaints.  Patient has seen cardiologist for these symptoms and has a monitor in place now for this.  Patient has in the past been placed on metoprolol which he is still taking.  Patient denies alcohol or drug use.  Patient states he had a heart catheterization done in 2021 which showed no blockage.    Review of patient's allergies indicates:  No Known Allergies  Past Medical History:   Diagnosis Date    GERD (gastroesophageal reflux disease)     Palpitations     Rheumatoid arthritis      History reviewed. No pertinent surgical history.  Family History   Problem Relation Age of Onset    Lung cancer Mother     Allergies Brother      Social History     Tobacco Use    Smoking status: Never    Smokeless tobacco: Never   Substance Use Topics    Alcohol use: Never    Drug use: Never     Review of Systems   Respiratory:  Positive for chest tightness and shortness of breath.    Cardiovascular:  Positive for palpitations.   All other systems reviewed and are negative.    Physical Exam     Initial Vitals   BP Pulse Resp Temp SpO2   04/15/23 0246 04/15/23 0246 04/15/23 0246 04/15/23 0250 04/15/23 0246   (!) 141/85 83 16 98.7 °F (37.1 °C) 99 %      MAP       --                Physical Exam    Nursing note and vitals  reviewed.  Constitutional: He appears well-developed and well-nourished.   HENT:   Head: Normocephalic and atraumatic.   Mouth/Throat: Oropharynx is clear and moist.   Eyes: Pupils are equal, round, and reactive to light.   Neck: Neck supple.   Normal range of motion.  Cardiovascular:  Normal rate and regular rhythm.           Pulmonary/Chest: Effort normal and breath sounds normal.   Abdominal: Abdomen is soft. He exhibits no distension.   Musculoskeletal:         General: Normal range of motion.      Cervical back: Normal range of motion and neck supple.     Neurological: He is alert.   Skin: Skin is warm. Capillary refill takes less than 2 seconds.   Psychiatric: He has a normal mood and affect.   Patient seems anxious.       Medical Screening Exam   See Full Note    ED Course   Procedures  Labs Reviewed - No data to display     ECG Results              EKG 12-lead (In process)  Result time 04/15/23 08:14:46      In process by Interface, Lab In [facility] (04/15/23 08:14:46)                   Narrative:    Test Reason : R00.2,    Vent. Rate : 084 BPM     Atrial Rate : 000 BPM     P-R Int : 136 ms          QRS Dur : 106 ms      QT Int : 354 ms       P-R-T Axes : 063 062 045 degrees     QTc Int : 396 ms    Sinus rhythm  Normal ECG      Referred By: [email or web address]   SELF           Confirmed By:                                   Imaging Results    None          Medications - No data to display              ED Course as of 04/15/23 2051   Sat Apr 15, 2023   3222 Medical decision-making:  Differential diagnosis includes anxiety, palpitations, sinus tachycardia, atrial fibrillation with rapid ventricular response, SVT.  Outside medical record reviewed including previous emergency department on the 13th with the same symptoms during which patient had negative troponin, negative D-dimer, EKG showing sinus tachycardia.  CBC and CMP were normal. [BB]   0249 EKG interpreted by me shows sinus rhythm, rate of 84, no acute ST segment  changes.  Completely normal EKG. [BB]      ED Course User Index  [BB] Edward Muñoz MD          Clinical Impression:   Final diagnoses:  [R00.2] Palpitations  [F41.9] Anxiety (Primary)        ED Disposition Condition    Discharge Stable          ED Prescriptions    None       Follow-up Information    None          Edward Muñoz MD  04/15/23 2051

## 2023-04-17 ENCOUNTER — PATIENT MESSAGE (OUTPATIENT)
Dept: CARDIOLOGY | Facility: CLINIC | Age: 43
End: 2023-04-17
Payer: OTHER GOVERNMENT

## 2023-04-17 ENCOUNTER — CLINICAL SUPPORT (OUTPATIENT)
Dept: FAMILY MEDICINE | Facility: CLINIC | Age: 43
End: 2023-04-17
Payer: OTHER GOVERNMENT

## 2023-04-17 NOTE — TELEPHONE ENCOUNTER
Pt. Notified to continue toprol XL 50mg daily for 1 week then call and let us know BP readings will give further instructions on Toprol at that time per Giselle Martinez NP Pt. Voiced understanding.

## 2023-04-18 ENCOUNTER — PATIENT MESSAGE (OUTPATIENT)
Dept: CARDIOLOGY | Facility: CLINIC | Age: 43
End: 2023-04-18
Payer: OTHER GOVERNMENT

## 2023-04-20 ENCOUNTER — OFFICE VISIT (OUTPATIENT)
Dept: FAMILY MEDICINE | Facility: CLINIC | Age: 43
End: 2023-04-20
Payer: OTHER GOVERNMENT

## 2023-04-20 ENCOUNTER — HOSPITAL ENCOUNTER (EMERGENCY)
Facility: HOSPITAL | Age: 43
Discharge: HOME | End: 2023-04-20
Attending: FAMILY MEDICINE
Payer: COMMERCIAL

## 2023-04-20 VITALS
TEMPERATURE: 98 F | SYSTOLIC BLOOD PRESSURE: 146 MMHG | HEART RATE: 71 BPM | BODY MASS INDEX: 41.72 KG/M2 | OXYGEN SATURATION: 96 % | DIASTOLIC BLOOD PRESSURE: 85 MMHG | HEIGHT: 72 IN | WEIGHT: 308 LBS | RESPIRATION RATE: 15 BRPM

## 2023-04-20 VITALS
SYSTOLIC BLOOD PRESSURE: 140 MMHG | DIASTOLIC BLOOD PRESSURE: 90 MMHG | WEIGHT: 308 LBS | BODY MASS INDEX: 41.72 KG/M2 | RESPIRATION RATE: 20 BRPM | TEMPERATURE: 98 F | HEIGHT: 72 IN | OXYGEN SATURATION: 95 % | HEART RATE: 92 BPM

## 2023-04-20 DIAGNOSIS — R00.2 PALPITATIONS: ICD-10-CM

## 2023-04-20 DIAGNOSIS — R07.89 OTHER CHEST PAIN: Primary | ICD-10-CM

## 2023-04-20 DIAGNOSIS — R07.9 CHEST PAIN: ICD-10-CM

## 2023-04-20 DIAGNOSIS — R07.9 CHEST PAIN, UNSPECIFIED TYPE: Primary | ICD-10-CM

## 2023-04-20 LAB
ALBUMIN SERPL BCP-MCNC: 3.9 G/DL (ref 3.5–5)
ALBUMIN/GLOB SERPL: 1.1 {RATIO}
ALP SERPL-CCNC: 119 U/L (ref 45–115)
ALT SERPL W P-5'-P-CCNC: 59 U/L (ref 16–61)
ANION GAP SERPL CALCULATED.3IONS-SCNC: 14 MMOL/L (ref 7–16)
AST SERPL W P-5'-P-CCNC: 24 U/L (ref 15–37)
BASOPHILS # BLD AUTO: 0.04 K/UL (ref 0–0.2)
BASOPHILS NFR BLD AUTO: 0.4 % (ref 0–1)
BILIRUB SERPL-MCNC: 0.5 MG/DL (ref ?–1.2)
BUN SERPL-MCNC: 12 MG/DL (ref 7–18)
BUN/CREAT SERPL: 10 (ref 6–20)
CALCIUM SERPL-MCNC: 8.9 MG/DL (ref 8.5–10.1)
CHLORIDE SERPL-SCNC: 104 MMOL/L (ref 98–107)
CO2 SERPL-SCNC: 27 MMOL/L (ref 21–32)
CREAT SERPL-MCNC: 1.24 MG/DL (ref 0.7–1.3)
DIFFERENTIAL METHOD BLD: ABNORMAL
EGFR (NO RACE VARIABLE) (RUSH/TITUS): 73 ML/MIN/1.73M²
EKG 12-LEAD: NORMAL
EOSINOPHIL # BLD AUTO: 0.04 K/UL (ref 0–0.5)
EOSINOPHIL NFR BLD AUTO: 0.4 % (ref 1–4)
ERYTHROCYTE [DISTWIDTH] IN BLOOD BY AUTOMATED COUNT: 13.3 % (ref 11.5–14.5)
GLOBULIN SER-MCNC: 3.5 G/DL (ref 2–4)
GLUCOSE SERPL-MCNC: 99 MG/DL (ref 74–106)
HCT VFR BLD AUTO: 43 % (ref 40–54)
HGB BLD-MCNC: 14.1 G/DL (ref 13.5–18)
IMM GRANULOCYTES # BLD AUTO: 0.03 K/UL (ref 0–0.04)
IMM GRANULOCYTES NFR BLD: 0.3 % (ref 0–0.4)
LYMPHOCYTES # BLD AUTO: 2.15 K/UL (ref 1–4.8)
LYMPHOCYTES NFR BLD AUTO: 23.1 % (ref 27–41)
MCH RBC QN AUTO: 26.6 PG (ref 27–31)
MCHC RBC AUTO-ENTMCNC: 32.8 G/DL (ref 32–36)
MCV RBC AUTO: 81.1 FL (ref 80–96)
MONOCYTES # BLD AUTO: 0.45 K/UL (ref 0–0.8)
MONOCYTES NFR BLD AUTO: 4.8 % (ref 2–6)
MPC BLD CALC-MCNC: 9.6 FL (ref 9.4–12.4)
NEUTROPHILS # BLD AUTO: 6.58 K/UL (ref 1.8–7.7)
NEUTROPHILS NFR BLD AUTO: 71 % (ref 53–65)
NRBC # BLD AUTO: 0 X10E3/UL
NRBC, AUTO (.00): 0 %
PLATELET # BLD AUTO: 248 K/UL (ref 150–400)
POTASSIUM SERPL-SCNC: 4.3 MMOL/L (ref 3.5–5.1)
PR INTERVAL: NORMAL
PROT SERPL-MCNC: 7.4 G/DL (ref 6.4–8.2)
PRT AXES: NORMAL
QRS DURATION: NORMAL
QT/QTC: NORMAL
RBC # BLD AUTO: 5.3 M/UL (ref 4.6–6.2)
SODIUM SERPL-SCNC: 141 MMOL/L (ref 136–145)
TROPONIN I SERPL HS-MCNC: 6.9 PG/ML
VENTRICULAR RATE: NORMAL
WBC # BLD AUTO: 9.29 K/UL (ref 4.5–11)

## 2023-04-20 PROCEDURE — 99284 EMERGENCY DEPT VISIT MOD MDM: CPT | Mod: ,,, | Performed by: FAMILY MEDICINE

## 2023-04-20 PROCEDURE — 99212 PR OFFICE/OUTPT VISIT, EST, LEVL II, 10-19 MIN: ICD-10-PCS | Mod: ,,, | Performed by: FAMILY MEDICINE

## 2023-04-20 PROCEDURE — 93005 ELECTROCARDIOGRAM TRACING: CPT | Mod: ,,, | Performed by: FAMILY MEDICINE

## 2023-04-20 PROCEDURE — 85025 COMPLETE CBC W/AUTO DIFF WBC: CPT | Performed by: FAMILY MEDICINE

## 2023-04-20 PROCEDURE — 93005 POCT EKG 12-LEAD: ICD-10-PCS | Mod: ,,, | Performed by: FAMILY MEDICINE

## 2023-04-20 PROCEDURE — 93005 ELECTROCARDIOGRAM TRACING: CPT

## 2023-04-20 PROCEDURE — 93010 EKG 12-LEAD: ICD-10-PCS | Mod: ,,, | Performed by: HOSPITALIST

## 2023-04-20 PROCEDURE — 80053 COMPREHEN METABOLIC PANEL: CPT | Performed by: FAMILY MEDICINE

## 2023-04-20 PROCEDURE — 99284 PR EMERGENCY DEPT VISIT,LEVEL IV: ICD-10-PCS | Mod: ,,, | Performed by: FAMILY MEDICINE

## 2023-04-20 PROCEDURE — 84484 ASSAY OF TROPONIN QUANT: CPT | Performed by: FAMILY MEDICINE

## 2023-04-20 PROCEDURE — 93010 ELECTROCARDIOGRAM REPORT: CPT | Mod: ,,, | Performed by: HOSPITALIST

## 2023-04-20 PROCEDURE — 99212 OFFICE O/P EST SF 10 MIN: CPT | Mod: ,,, | Performed by: FAMILY MEDICINE

## 2023-04-20 PROCEDURE — 99284 EMERGENCY DEPT VISIT MOD MDM: CPT

## 2023-04-20 RX ORDER — BUSPIRONE HYDROCHLORIDE 10 MG/1
10 TABLET ORAL 2 TIMES DAILY
Qty: 60 TABLET | Refills: 3 | Status: SHIPPED | OUTPATIENT
Start: 2023-04-20 | End: 2023-04-20 | Stop reason: SDUPTHER

## 2023-04-20 RX ORDER — BUSPIRONE HYDROCHLORIDE 10 MG/1
10 TABLET ORAL 2 TIMES DAILY
Qty: 60 TABLET | Refills: 3 | Status: SHIPPED | OUTPATIENT
Start: 2023-04-20 | End: 2023-09-20

## 2023-04-20 NOTE — Clinical Note
"Jigar Forrest"Neo was seen and treated in our emergency department on 4/20/2023.  He may return to work on 04/24/2023.       If you have any questions or concerns, please don't hesitate to call.      Lloyd Mackenzie, DO"

## 2023-04-20 NOTE — ED PROVIDER NOTES
Encounter Date: 4/20/2023    SCRIBE #1 NOTE: I, Lorri Vale, am scribing for, and in the presence of,  Lloyd Mackenzie DO. I have scribed the entire note.     History     Chief Complaint   Patient presents with    Chest Pain     Patient is a 43 y.o. male who presents to the emergency department via EMS with complaints of chest pain. Patient explains that today at around 11:00 he began to experience heart palpitations and intermittent chest pain. Patient was seen in Dr. Carmichael's clinic and was sent to the ED for evaluation. Patient notes a medical history of anxiety. No there symptoms were reported.    The history is provided by the patient. No  was used.   Review of patient's allergies indicates:  No Known Allergies  Past Medical History:   Diagnosis Date    GERD (gastroesophageal reflux disease)     Palpitations     Rheumatoid arthritis      History reviewed. No pertinent surgical history.  Family History   Problem Relation Age of Onset    Lung cancer Mother     Allergies Brother      Social History     Tobacco Use    Smoking status: Never     Passive exposure: Never    Smokeless tobacco: Never   Substance Use Topics    Alcohol use: Never    Drug use: Never     Review of Systems   Eyes: Negative.    Cardiovascular:  Positive for chest pain and palpitations.   Endocrine: Negative.    Skin: Negative.    Allergic/Immunologic: Negative.    Neurological: Negative.    Hematological: Negative.    Psychiatric/Behavioral: Negative.     All other systems reviewed and are negative.    Physical Exam     Initial Vitals [04/20/23 1522]   BP Pulse Resp Temp SpO2   (!) 162/88 74 16 98.3 °F (36.8 °C) 99 %      MAP       --         Physical Exam    Nursing note and vitals reviewed.  Constitutional: He appears well-developed and well-nourished.   HENT:   Head: Normocephalic and atraumatic.   Cardiovascular:  Normal rate, regular rhythm and normal heart sounds.           Pulmonary/Chest: Breath sounds  normal.   Abdominal: Abdomen is soft. Bowel sounds are normal.     Neurological: He is alert and oriented to person, place, and time.   Skin: Skin is warm and dry.   Psychiatric: He has a normal mood and affect. Thought content normal.       ED Course   Procedures  Labs Reviewed   COMPREHENSIVE METABOLIC PANEL - Abnormal; Notable for the following components:       Result Value    Alk Phos 119 (*)     All other components within normal limits   CBC WITH DIFFERENTIAL - Abnormal; Notable for the following components:    MCH 26.6 (*)     Neutrophils % 71.0 (*)     Lymphocytes % 23.1 (*)     Eosinophils % 0.4 (*)     All other components within normal limits   TROPONIN I - Normal   CBC W/ AUTO DIFFERENTIAL    Narrative:     The following orders were created for panel order CBC Auto Differential.  Procedure                               Abnormality         Status                     ---------                               -----------         ------                     CBC with Differential[[id number]]        Abnormal            Final result                 Please view results for these tests on the individual orders.        ECG Results              EKG 12-lead (In process)  Result time 04/20/23 15:27:07      In process by Interface, Lab In [facility] (04/20/23 15:27:07)                   Narrative:    Test Reason : R07.9,    Vent. Rate : 077 BPM     Atrial Rate : 000 BPM     P-R Int : 100 ms          QRS Dur : 106 ms      QT Int : 370 ms       P-R-T Axes : 044 047 003 degrees     QTc Int : 400 ms    Sinus rhythm  Short WV interval  Inferior T wave abnormality is nonspecific  Borderline ECG      Referred By: [email or web address]   SELF           Confirmed By:                                   Imaging Results    None          Medications - No data to display  Medical Decision Making:   Initial Assessment:   As mentioned before the patient comes in having 3 episodes of elevated heart rate and having anxiety with his chest tightness.  He  was sent from Dr. Carmichael's office for re-evaluation and lab work to make sure his troponin was elevated.  Has a history of anxiety also  Differential Diagnosis:   Palpitations.  2. Anxiety.  Arthritis  ED Management:  Follow-up with regular doctor for re-evaluation.          Attending Attestation:           Physician Attestation for Scribe:  Physician Attestation Statement for Scribe #1: I, Lloyd Mackenzie DO, reviewed documentation, as scribed by Lorri Vale in my presence, and it is both accurate and complete.                        Clinical Impression:   Final diagnoses:  [R07.9] Chest pain  [R07.89] Other chest pain (Primary)  [R00.2] Palpitations        ED Disposition Condition    Discharge Stable          ED Prescriptions    None       Follow-up Information    None          Lloyd Mackenzie DO  04/20/23 1925

## 2023-04-20 NOTE — DISCHARGE INSTRUCTIONS
Continue current medicines follow-up primary care provider  take 1/2 of the 10 mg BuSpar b.i.d. for the 1st 7 days then take 1 BuSpar  10 mg tablet b.i.d.

## 2023-04-20 NOTE — PROGRESS NOTES
Jigar Larson is a 43 y.o. male seen today for 2-3 hours of central chest discomfort tightness associated with mild shortness of breath and palpitations.  Patient is already taking 325 mg of aspirin with no improvement of his symptoms.  Patient has had similar symptoms in the past that may and may not be related to anxiety.  Patient was sat down here in the office started on O2 without resolution of his symptoms.    Past Medical History:   Diagnosis Date    GERD (gastroesophageal reflux disease)     Palpitations     Rheumatoid arthritis      Family History   Problem Relation Age of Onset    Lung cancer Mother     Allergies Brother      Current Outpatient Medications on File Prior to Visit   Medication Sig Dispense Refill    etanercept (ENBREL) 50 mg/mL (1 mL) injection Inject 50 mg into the skin once a week.      hydrOXYzine pamoate (VISTARIL) 25 MG Cap Take 1 capsule (25 mg total) by mouth every 8 (eight) hours as needed (anxiety). 90 capsule 0    metoprolol succinate (TOPROL-XL) 25 MG 24 hr tablet Take 1 tablet (25 mg total) by mouth once daily. 90 tablet 2    pantoprazole (PROTONIX) 40 MG tablet Take 40 mg by mouth once daily.       No current facility-administered medications on file prior to visit.     Immunization History   Administered Date(s) Administered    COVID-19, MRNA, LN-S, PF (Pfizer) (Purple Cap) 04/09/2021, 04/30/2021    Influenza - Quadrivalent - PF *Preferred* (6 months and older) 10/14/2022       Review of Systems   Constitutional:  Positive for malaise/fatigue. Negative for fever and weight loss.   Respiratory:  Positive for shortness of breath.    Cardiovascular:  Positive for chest pain and palpitations.   Gastrointestinal:  Negative for nausea and vomiting.   Neurological:  Positive for dizziness.   Psychiatric/Behavioral:  Negative for depression.       Vitals:    04/20/23 1354   BP: (!) 140/90   Pulse: 92   Resp: 20   Temp: 98.4 °F (36.9 °C)       Physical Exam  Vitals reviewed.    Constitutional:       Appearance: Normal appearance.   HENT:      Head: Normocephalic.   Eyes:      Extraocular Movements: Extraocular movements intact.      Conjunctiva/sclera: Conjunctivae normal.      Pupils: Pupils are equal, round, and reactive to light.   Neck:      Thyroid: No thyroid mass or thyromegaly.   Cardiovascular:      Rate and Rhythm: Normal rate and regular rhythm.      Heart sounds: Normal heart sounds. No murmur heard.    No gallop.   Pulmonary:      Effort: Pulmonary effort is normal. No respiratory distress.      Breath sounds: Normal breath sounds. No wheezing or rales.   Skin:     General: Skin is warm and dry.      Coloration: Skin is not jaundiced or pale.   Neurological:      Mental Status: He is alert.   Psychiatric:         Mood and Affect: Mood normal.         Behavior: Behavior normal.         Thought Content: Thought content normal.         Judgment: Judgment normal.        Assessment and Plan  Chest pain, unspecified type            Return to clinic as needed after his ER evaluation.  Patient be transported via [facility] to [facility].    Health Maintenance Topics with due status: Not Due       Topic Last Completion Date    Lipid Panel 04/18/2022

## 2023-04-25 ENCOUNTER — TELEPHONE (OUTPATIENT)
Dept: CARDIOLOGY | Facility: CLINIC | Age: 43
End: 2023-04-25
Payer: OTHER GOVERNMENT

## 2023-04-25 NOTE — TELEPHONE ENCOUNTER
Pt. Recently went to ER for chest pain was not admitted. Pt. Request further instructions. Pt. Was informed that Giselle Martinez NP reviewed ER records awaiting ZIO report will see in clinic as scheduled will evaluate and may repeat stress test if indicated per Giselle Martinez NP. Pt. Voiced understanding.

## 2023-04-27 ENCOUNTER — HOSPITAL ENCOUNTER (EMERGENCY)
Facility: HOSPITAL | Age: 43
Discharge: HOME | End: 2023-04-27
Attending: FAMILY MEDICINE
Payer: OTHER GOVERNMENT

## 2023-04-27 VITALS
SYSTOLIC BLOOD PRESSURE: 154 MMHG | TEMPERATURE: 98 F | HEIGHT: 72 IN | RESPIRATION RATE: 13 BRPM | DIASTOLIC BLOOD PRESSURE: 80 MMHG | OXYGEN SATURATION: 94 % | HEART RATE: 95 BPM | BODY MASS INDEX: 41.45 KG/M2 | WEIGHT: 306 LBS

## 2023-04-27 DIAGNOSIS — R00.2 PALPITATION: ICD-10-CM

## 2023-04-27 DIAGNOSIS — R00.2 PALPITATIONS: ICD-10-CM

## 2023-04-27 DIAGNOSIS — F41.0 ANXIETY ATTACK: Primary | ICD-10-CM

## 2023-04-27 LAB
ALBUMIN SERPL BCP-MCNC: 3.7 G/DL (ref 3.5–5)
ALBUMIN/GLOB SERPL: 1.1 {RATIO}
ALP SERPL-CCNC: 120 U/L (ref 45–115)
ALT SERPL W P-5'-P-CCNC: 68 U/L (ref 16–61)
ANION GAP SERPL CALCULATED.3IONS-SCNC: 13 MMOL/L (ref 7–16)
AST SERPL W P-5'-P-CCNC: 30 U/L (ref 15–37)
BILIRUB SERPL-MCNC: 0.4 MG/DL (ref ?–1.2)
BUN SERPL-MCNC: 14 MG/DL (ref 7–18)
BUN/CREAT SERPL: 12 (ref 6–20)
CALCIUM SERPL-MCNC: 8.7 MG/DL (ref 8.5–10.1)
CHLORIDE SERPL-SCNC: 103 MMOL/L (ref 98–107)
CO2 SERPL-SCNC: 25 MMOL/L (ref 21–32)
CREAT SERPL-MCNC: 1.19 MG/DL (ref 0.7–1.3)
EGFR (NO RACE VARIABLE) (RUSH/TITUS): 77 ML/MIN/1.73M²
GLOBULIN SER-MCNC: 3.3 G/DL (ref 2–4)
GLUCOSE SERPL-MCNC: 196 MG/DL (ref 74–106)
POTASSIUM SERPL-SCNC: 3.9 MMOL/L (ref 3.5–5.1)
PROT SERPL-MCNC: 7 G/DL (ref 6.4–8.2)
SODIUM SERPL-SCNC: 137 MMOL/L (ref 136–145)
TROPONIN I SERPL HS-MCNC: 5.6 PG/ML

## 2023-04-27 PROCEDURE — 99284 EMERGENCY DEPT VISIT MOD MDM: CPT

## 2023-04-27 PROCEDURE — 84484 ASSAY OF TROPONIN QUANT: CPT | Performed by: FAMILY MEDICINE

## 2023-04-27 PROCEDURE — 93010 ELECTROCARDIOGRAM REPORT: CPT | Mod: ,,, | Performed by: STUDENT IN AN ORGANIZED HEALTH CARE EDUCATION/TRAINING PROGRAM

## 2023-04-27 PROCEDURE — 99284 PR EMERGENCY DEPT VISIT,LEVEL IV: ICD-10-PCS | Mod: ,,, | Performed by: FAMILY MEDICINE

## 2023-04-27 PROCEDURE — 80053 COMPREHEN METABOLIC PANEL: CPT | Performed by: FAMILY MEDICINE

## 2023-04-27 PROCEDURE — 93005 ELECTROCARDIOGRAM TRACING: CPT

## 2023-04-27 PROCEDURE — 99284 EMERGENCY DEPT VISIT MOD MDM: CPT | Mod: ,,, | Performed by: FAMILY MEDICINE

## 2023-04-27 PROCEDURE — 93010 EKG 12-LEAD: ICD-10-PCS | Mod: ,,, | Performed by: STUDENT IN AN ORGANIZED HEALTH CARE EDUCATION/TRAINING PROGRAM

## 2023-04-27 RX ORDER — METOPROLOL SUCCINATE 50 MG/1
50 TABLET, EXTENDED RELEASE ORAL DAILY
COMMUNITY
End: 2023-04-27 | Stop reason: SDUPTHER

## 2023-04-27 NOTE — ED TRIAGE NOTES
PAT[facility] PRES[facility] ER [facility] AND STARTED HAVING PALPITATIONS.

## 2023-04-27 NOTE — ED PROVIDER NOTES
Encounter Date: 4/27/2023       History     Chief Complaint   Patient presents with    Palpitations    Anxiety     Patient comes in after getting in his car [employer] having a palpitation episode.  He had been increased to 50 a Lopressor but now he has on his own started taking 25 Lopressor.  He also has a history anxiety and is on BuSpar for that he has been on BuSpar for 1 week he sticks and advanced to his higher dose.      Review of patient's allergies indicates:  No Known Allergies  Past Medical History:   Diagnosis Date    GERD (gastroesophageal reflux disease)     Palpitations     Rheumatoid arthritis      No past surgical history on file.  Family History   Problem Relation Age of Onset    Lung cancer Mother     Allergies Brother      Social History     Tobacco Use    Smoking status: Never     Passive exposure: Never    Smokeless tobacco: Never   Substance Use Topics    Alcohol use: Never    Drug use: Never     Review of Systems   Constitutional:  Positive for fatigue. Negative for fever.   HENT: Negative.  Negative for sore throat.    Eyes: Negative.    Respiratory: Negative.  Negative for shortness of breath.    Cardiovascular: Negative.  Negative for chest pain.   Gastrointestinal: Negative.  Negative for nausea.   Endocrine: Negative.    Genitourinary: Negative.  Negative for dysuria.   Musculoskeletal: Negative.  Negative for back pain.   Skin: Negative.  Negative for rash.   Allergic/Immunologic: Negative.    Neurological: Negative.  Negative for weakness.   Hematological: Negative.  Does not bruise/bleed easily.   Psychiatric/Behavioral: Negative.       Physical Exam     Initial Vitals [04/27/23 1309]   BP Pulse Resp Temp SpO2   (!) 172/108 102 20 98.2 °F (36.8 °C) 100 %      MAP       --         Physical Exam    Constitutional: He appears well-developed and well-nourished.   HENT:   Head: Normocephalic and atraumatic.   Right Ear: External ear normal.   Left Ear: External ear normal.   Nose: Nose  normal.   Mouth/Throat: Oropharynx is clear and moist.   Eyes: Conjunctivae and EOM are normal. Pupils are equal, round, and reactive to light.   Neck: Neck supple.   Normal range of motion.  Cardiovascular:  Normal rate, regular rhythm, normal heart sounds and intact distal pulses.           Pulmonary/Chest: Breath sounds normal.   Abdominal: Abdomen is soft. Bowel sounds are normal.   Genitourinary:    Prostate and penis normal.     Musculoskeletal:         General: Normal range of motion.      Cervical back: Normal range of motion and neck supple.     Neurological: He is alert and oriented to person, place, and time. He has normal strength and normal reflexes.   Skin: Skin is warm and dry.   Psychiatric: He has a normal mood and affect. His behavior is normal. Judgment and thought content normal.       Medical Screening Exam   See Full Note    ED Course   Procedures  Labs Reviewed   COMPREHENSIVE METABOLIC PANEL - Abnormal; Notable for the following components:       Result Value    Glucose 196 (*)     Alk Phos 120 (*)     ALT 68 (*)     All other components within normal limits   TROPONIN I - Normal        ECG Results              EKG 12-lead (Final result)  Result time 04/28/23 16:30:58      Final result by Interface, Lab In [facility] (04/28/23 16:30:58)                   Narrative:    Test Reason : R00.2,    Vent. Rate : 089 BPM     Atrial Rate : 000 BPM     P-R Int : 096 ms          QRS Dur : 104 ms      QT Int : 348 ms       P-R-T Axes : 031 057 -06 degrees     QTc Int : 399 ms    Sinus rhythm  Short [place] interval  Inferior ST-T abnormality is nonspecific  Borderline ECG    Confirmed by Regino PADILLA, Fabio ROPER ([id number]) on 4/28/2023 4:30:43 PM    Referred By: IVAN   SELF           Confirmed By:Fabio Lacy MD                                  Imaging Results    None          Medications - No data to display  Medical Decision Making:   Initial Assessment:   As mentioned before the patient  is in the ER  after having an episode at [facility].  The patient states there were palpitations.  He came in for further evaluation treatment  Differential Diagnosis:   Palpitations.  Anxiety  ED Management:  Continue current medicines and follow-up primary care provider                       Clinical Impression:   Final diagnoses:  [R00.2] Palpitation  [F41.0] Anxiety attack (Primary)  [R00.2] Palpitations        ED Disposition Condition    Discharge Stable          ED Prescriptions    None       Follow-up Information    None          Lloyd Mackenzie,   05/11/23 1800

## 2023-04-28 ENCOUNTER — TELEPHONE (OUTPATIENT)
Dept: CARDIOLOGY | Facility: CLINIC | Age: 43
End: 2023-04-28
Payer: OTHER GOVERNMENT

## 2023-04-28 PROCEDURE — 93248 EXT ECG>7D<15D REV&INTERPJ: CPT | Mod: ,,, | Performed by: STUDENT IN AN ORGANIZED HEALTH CARE EDUCATION/TRAINING PROGRAM

## 2023-04-28 PROCEDURE — 93248 PR EXT ECG, CONT, > 7 DAYS <= 15 DAYS, REVIEW W/INT: ICD-10-PCS | Mod: ,,, | Performed by: STUDENT IN AN ORGANIZED HEALTH CARE EDUCATION/TRAINING PROGRAM

## 2023-04-28 RX ORDER — METOPROLOL SUCCINATE 50 MG/1
50 TABLET, EXTENDED RELEASE ORAL DAILY
Qty: 90 TABLET | Refills: 1 | Status: SHIPPED | OUTPATIENT
Start: 2023-04-28 | End: 2023-10-09

## 2023-05-02 ENCOUNTER — OFFICE VISIT (OUTPATIENT)
Dept: CARDIOLOGY | Facility: CLINIC | Age: 43
End: 2023-05-02
Payer: OTHER GOVERNMENT

## 2023-05-02 VITALS
HEIGHT: 72 IN | WEIGHT: 311.38 LBS | OXYGEN SATURATION: 98 % | HEART RATE: 87 BPM | DIASTOLIC BLOOD PRESSURE: 70 MMHG | BODY MASS INDEX: 42.18 KG/M2 | SYSTOLIC BLOOD PRESSURE: 128 MMHG

## 2023-05-02 DIAGNOSIS — R07.9 CHEST PAIN, UNSPECIFIED TYPE: Primary | ICD-10-CM

## 2023-05-02 DIAGNOSIS — Z86.79 HISTORY OF ATRIAL FIBRILLATION: ICD-10-CM

## 2023-05-02 DIAGNOSIS — I47.29 NSVT (NONSUSTAINED VENTRICULAR TACHYCARDIA): ICD-10-CM

## 2023-05-02 DIAGNOSIS — R00.2 PALPITATIONS: ICD-10-CM

## 2023-05-02 PROCEDURE — 99213 OFFICE O/P EST LOW 20 MIN: CPT | Mod: S$PBB,,, | Performed by: NURSE PRACTITIONER

## 2023-05-02 PROCEDURE — 99214 OFFICE O/P EST MOD 30 MIN: CPT | Mod: PBBFAC | Performed by: NURSE PRACTITIONER

## 2023-05-02 PROCEDURE — 99213 PR OFFICE/OUTPT VISIT, EST, LEVL III, 20-29 MIN: ICD-10-PCS | Mod: S$PBB,,, | Performed by: NURSE PRACTITIONER

## 2023-05-04 NOTE — ASSESSMENT & PLAN NOTE
ZIO monitor 4/15/2023: Results reviewed, no significant findings with 22 patient triggered events; Max rate 159, Avg rate 79, 5 beat run of SVT     Continue Toprol XL to 50mg daily  Likely anxiety/panic attacks  Patient recently started on buspar, thinks this is helping a little  Schedule exercise cardiolyte for completeness and to help ease pt's anxiety about chest pain and palpitations

## 2023-05-04 NOTE — PROGRESS NOTES
"PCP: Rasta Carmichael MD    Referring Provider:     Subjective:   Jigar Larson is a 43 y.o. male with hx of atrial fibrillation in 2010, sleep apnea compliant with cpap who presents for follow up ZIO results, previous pt of Dr. Aguirre.    He has had multiple ER and clinic visits since last seen on 4/4/23 with continued chest pain and palpitations. ZIO with no significant arrhythmia noted with 22 patient triggered events. He was recently started on buspar for anxiety and feels that it is helping a little. Discussed repeating stress test for thoroughness and to help him feel less anxious about his heart.     4/4/2023:   Patient reports having more frequent episodes of palpitations, went to ED Sunday 4/2/2023 and workup was negative according to him. ER records and labs reviewed CBC and CMP normal, TSH normal on 11/12/2022. EKG NSR.      Patient with c/o more frequent episodes of palpitations with sob and chest tightness. States his heart rate gets as high as 160s on his apple watch but doesn't usually last very long. He has completely stopped drinking coffee and has been drinking about 64oz of water daily.     Reports there are no aggravating or alleviating factors related to these episodes but admits that when he start "feeling funny" he becomes nervous/anxious and is scared he is going to drop dead. Concerned it may possibly be panic attacks.      I had Dr. Lacy review cath films today, he feels no further ischemic evaluation is warranted at this time.         Fhx: Family history negative for sudden death.  Shx: Nonsmoker, no alcohol or drug abuse    EKG 4/4/2023: NSR with T wave inversion in III & aVF             11/8/22: NSR with T wave inversion in III & aVF, no changes when compared to previous EKG 11/8/2021 11/8/21:  Normal sinus rhythm.    24 Holter 8/4/2020:  Maximum heart rate of 137 bpm was sinus tachycardia. Short run of nonsustained ventricular tachycardia, 5 beats. Overall PVC burden is " less than 1%  ZIO monitor 4/15/2023: No significant findings with 22 patient triggered events    ECHO 8/4/2020: Normal left ventricular cavity with overall normal systolic function. LVEF 55-60%.                              No significant valvular abnormalities were noted.   [facility] 8/25/2020:  Nonobstructive coronary artery disease.                             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        Lab Results   Component Value Date     04/27/2023    K 3.9 04/27/2023     04/27/2023    CO2 25 04/27/2023    BUN 14 04/27/2023    CREATININE 1.19 04/27/2023    CALCIUM 8.7 04/27/2023    ANIONGAP 13 04/27/2023       Lab Results   Component Value Date    CHOL 162 04/18/2022     Lab Results   Component Value Date    HDL 45 04/18/2022     Lab Results   Component Value Date    LDLCALC 95 04/18/2022     Lab Results   Component Value Date    TRIG 109 04/18/2022     Lab Results   Component Value Date    CHOLHDL 3.6 04/18/2022       Lab Results   Component Value Date    WBC 9.29 04/20/2023    HGB 14.1 04/20/2023    HCT 43.0 04/20/2023    MCV 81.1 04/20/2023     04/20/2023           Current Outpatient Medications:     busPIRone (BUSPAR) 10 MG tablet, Take 1 tablet (10 mg total) by mouth 2 (two) times daily., Disp: 60 tablet, Rfl: 3    etanercept (ENBREL) 50 mg/mL (1 mL) injection, Inject 50 mg into the skin once a week., Disp: , Rfl:     metoprolol succinate (TOPROL-XL) 50 MG 24 hr tablet, Take 1 tablet (50 mg total) by mouth once daily., Disp: 90 tablet, Rfl: 1    pantoprazole (PROTONIX) 40 MG tablet, Take 40 mg by mouth once daily., Disp: , Rfl:     Review of Systems   Constitutional:  Negative for chills, fever and malaise/fatigue.   HENT: Negative.     Eyes: Negative.    Respiratory:  Positive for shortness of breath.    Cardiovascular:  Positive for chest pain and palpitations. Negative for orthopnea and leg swelling.   Gastrointestinal: Negative.    Musculoskeletal:  Negative.    Skin: Negative.    Neurological:  Negative for dizziness, loss of consciousness and headaches.   Psychiatric/Behavioral:  The patient is nervous/anxious.        Objective:   /70 (BP Location: Left arm, Patient Position: Sitting)   Pulse 87   Ht 6' (1.829 m)   Wt (!) 141.3 kg (311 lb 6.4 oz)   SpO2 98%   BMI 42.23 kg/m²     Physical Exam  Vitals reviewed.   Constitutional:       Appearance: He is obese.   Neck:      Vascular: No carotid bruit.   Cardiovascular:      Rate and Rhythm: Normal rate and regular rhythm.      Pulses: Normal pulses.      Heart sounds: Normal heart sounds.   Pulmonary:      Effort: Pulmonary effort is normal.      Breath sounds: Normal breath sounds. No wheezing, rhonchi or rales.   Abdominal:      General: Bowel sounds are normal.      Palpations: Abdomen is soft.   Musculoskeletal:      Right lower leg: No edema.      Left lower leg: No edema.   Skin:     General: Skin is warm and dry.      Coloration: Skin is not jaundiced or pale.   Neurological:      Mental Status: He is alert and oriented to person, place, and time.   Psychiatric:         Mood and Affect: Mood normal.         Behavior: Behavior normal.       Assessment:     1. Chest pain, unspecified type  Exercise Stress - EKG    CANCELED: NM Myocardial Perfusion Spect Multi Exer    CANCELED: Nuclear Stress Test      2. Palpitations  Ambulatory referral/consult to Cardiology    EKG 12-lead    Exercise Stress - EKG    CANCELED: Nuclear Stress Test      3. NSVT (nonsustained ventricular tachycardia)  EKG 12-lead    Exercise Stress - EKG    CANCELED: Nuclear Stress Test      4. History of atrial fibrillation  EKG 12-lead    Exercise Stress - EKG    CANCELED: Nuclear Stress Test            Plan:   NSVT (nonsustained ventricular tachycardia)  Single episode during ETT 8/4/2020  Echo 8/2020 normal  C 8/2020 normal  Reports hx of atrial fibrillation 2010    ZIO monitor 4/15/2023: Results reviewed, no significant  findings with 22 patient triggered events; Max rate 159, Avg rate 79, 5 beat run of SVT  Continue Toprol XL 50mg daily    Palpitations  ZIO monitor 4/15/2023: Results reviewed, no significant findings with 22 patient triggered events; Max rate 159, Avg rate 79, 5 beat run of SVT     Continue Toprol XL to 50mg daily  Likely anxiety/panic attacks  Patient recently started on buspar, thinks this is helping a little  Schedule exercise cardiolyte for completeness and to help ease pt's anxiety about chest pain and palpitations    History of atrial fibrillation  Reports hx of afib 2010    Schedule outpatient exercise cardiolyte, will call when results are available  Follow up with me in 6 months, or sooner if needed    5/16/2023:  Heart station called, patient is over the weight limit for NM imaging (weight 311 lbs). Changed to exercise treadmill and called patient to let him know. It is scheduled for tomorrow at 9:00am, 5/17/2023; Will call results when they are available.    5/23/2023:  Exercise stress test abnormal. Discussed with Tati Lawler to schedule LHC with him and call patient.

## 2023-05-04 NOTE — ASSESSMENT & PLAN NOTE
Single episode during ETT 8/4/2020  Echo 8/2020 normal  LHC 8/2020 normal  Reports hx of atrial fibrillation 2010    ZIO monitor 4/15/2023: Results reviewed, no significant findings with 22 patient triggered events; Max rate 159, Avg rate 79, 5 beat run of SVT  Continue Toprol XL 50mg daily

## 2023-05-17 ENCOUNTER — HOSPITAL ENCOUNTER (OUTPATIENT)
Dept: CARDIOLOGY | Facility: HOSPITAL | Age: 43
Discharge: HOME | End: 2023-05-17
Attending: NURSE PRACTITIONER
Payer: OTHER GOVERNMENT

## 2023-05-17 VITALS
HEIGHT: 72 IN | SYSTOLIC BLOOD PRESSURE: 142 MMHG | WEIGHT: 305 LBS | DIASTOLIC BLOOD PRESSURE: 76 MMHG | HEART RATE: 70 BPM | BODY MASS INDEX: 41.31 KG/M2

## 2023-05-17 DIAGNOSIS — R00.2 PALPITATIONS: ICD-10-CM

## 2023-05-17 DIAGNOSIS — I47.29 NSVT (NONSUSTAINED VENTRICULAR TACHYCARDIA): ICD-10-CM

## 2023-05-17 DIAGNOSIS — R07.9 CHEST PAIN, UNSPECIFIED TYPE: ICD-10-CM

## 2023-05-17 DIAGNOSIS — Z86.79 HISTORY OF ATRIAL FIBRILLATION: ICD-10-CM

## 2023-05-17 PROCEDURE — 93016 EXERCISE STRESS - EKG (CUPID ONLY): ICD-10-PCS | Mod: ,,, | Performed by: NURSE PRACTITIONER

## 2023-05-17 PROCEDURE — 93017 CV STRESS TEST TRACING ONLY: CPT

## 2023-05-17 PROCEDURE — 93016 CV STRESS TEST SUPVJ ONLY: CPT | Mod: ,,, | Performed by: NURSE PRACTITIONER

## 2023-05-17 PROCEDURE — 93018 CV STRESS TEST I&R ONLY: CPT | Mod: ,,, | Performed by: STUDENT IN AN ORGANIZED HEALTH CARE EDUCATION/TRAINING PROGRAM

## 2023-05-17 PROCEDURE — 93018 EXERCISE STRESS - EKG (CUPID ONLY): ICD-10-PCS | Mod: ,,, | Performed by: STUDENT IN AN ORGANIZED HEALTH CARE EDUCATION/TRAINING PROGRAM

## 2023-05-18 LAB
CV STRESS BASE HR: 70 BPM
DIASTOLIC BLOOD PRESSURE: 76 MMHG
OHS CV CPX 1 MINUTE RECOVERY HEART RATE: 113 BPM
OHS CV CPX 85 PERCENT MAX PREDICTED HEART RATE MALE: 149
OHS CV CPX ESTIMATED METS: 10
OHS CV CPX MAX PREDICTED HEART RATE: 175
OHS CV CPX PATIENT IS FEMALE: 0
OHS CV CPX PATIENT IS MALE: 1
OHS CV CPX PEAK DIASTOLIC BLOOD PRESSURE: 67 MMHG
OHS CV CPX PEAK HEAR RATE: 169 BPM
OHS CV CPX PEAK RATE PRESSURE PRODUCT: NORMAL
OHS CV CPX PEAK SYSTOLIC BLOOD PRESSURE: 150 MMHG
OHS CV CPX PERCENT MAX PREDICTED HEART RATE ACHIEVED: 97
OHS CV CPX RATE PRESSURE PRODUCT PRESENTING: 9940
STRESS ECHO POST EXERCISE DUR MIN: 7 MINUTES
STRESS ECHO POST EXERCISE DUR SEC: 50 SECONDS
SYSTOLIC BLOOD PRESSURE: 142 MMHG

## 2023-05-23 ENCOUNTER — TELEPHONE (OUTPATIENT)
Dept: CARDIOLOGY | Facility: CLINIC | Age: 43
End: 2023-05-23
Payer: OTHER GOVERNMENT

## 2023-05-23 NOTE — TELEPHONE ENCOUNTER
Pt. Notified stress test abnormal will schedule LHC per Giselle Martinez NP. Pt. Voiced understanding.

## 2023-05-25 DIAGNOSIS — R94.39 ABNORMAL CARDIOVASCULAR STRESS TEST: Primary | ICD-10-CM

## 2023-05-25 DIAGNOSIS — Z01.818 PREOP EXAMINATION: Primary | ICD-10-CM

## 2023-05-25 RX ORDER — SODIUM CHLORIDE 0.9 % (FLUSH) 0.9 %
2 SYRINGE (ML) INJECTION
Status: CANCELLED | OUTPATIENT
Start: 2023-06-06

## 2023-05-28 ENCOUNTER — PATIENT MESSAGE (OUTPATIENT)
Dept: CARDIOLOGY | Facility: CLINIC | Age: 43
End: 2023-05-28
Payer: OTHER GOVERNMENT

## 2023-05-30 ENCOUNTER — TELEPHONE (OUTPATIENT)
Dept: CARDIOLOGY | Facility: CLINIC | Age: 43
End: 2023-05-30
Payer: OTHER GOVERNMENT

## 2023-05-30 NOTE — TELEPHONE ENCOUNTER
Pt. Notified regarding message sent advised pt. To go to ER for chest pain or for any other problems or concerns heart cath scheduled for 6-6-23. Pt. Voiced understanding.

## 2023-06-01 ENCOUNTER — HOSPITAL ENCOUNTER (EMERGENCY)
Facility: HOSPITAL | Age: 43
Discharge: HOME | End: 2023-06-01
Payer: OTHER GOVERNMENT

## 2023-06-01 ENCOUNTER — OFFICE VISIT (OUTPATIENT)
Dept: FAMILY MEDICINE | Facility: CLINIC | Age: 43
End: 2023-06-01
Payer: OTHER GOVERNMENT

## 2023-06-01 VITALS
HEART RATE: 67 BPM | RESPIRATION RATE: 19 BRPM | HEIGHT: 72 IN | WEIGHT: 301 LBS | DIASTOLIC BLOOD PRESSURE: 90 MMHG | BODY MASS INDEX: 40.77 KG/M2 | TEMPERATURE: 98 F | SYSTOLIC BLOOD PRESSURE: 138 MMHG | OXYGEN SATURATION: 98 %

## 2023-06-01 VITALS
DIASTOLIC BLOOD PRESSURE: 80 MMHG | SYSTOLIC BLOOD PRESSURE: 127 MMHG | WEIGHT: 301 LBS | BODY MASS INDEX: 40.77 KG/M2 | OXYGEN SATURATION: 96 % | TEMPERATURE: 98 F | RESPIRATION RATE: 16 BRPM | HEART RATE: 61 BPM | HEIGHT: 72 IN

## 2023-06-01 DIAGNOSIS — R07.9 CHEST PAIN: ICD-10-CM

## 2023-06-01 DIAGNOSIS — R07.9 CHEST PAIN, UNSPECIFIED TYPE: Primary | ICD-10-CM

## 2023-06-01 DIAGNOSIS — F41.9 ANXIETY: Primary | ICD-10-CM

## 2023-06-01 LAB
ALBUMIN SERPL BCP-MCNC: 3.9 G/DL (ref 3.5–5)
ALBUMIN/GLOB SERPL: 1 {RATIO}
ALP SERPL-CCNC: 118 U/L (ref 45–115)
ALT SERPL W P-5'-P-CCNC: 48 U/L (ref 16–61)
AMPHET UR QL SCN: NEGATIVE
ANION GAP SERPL CALCULATED.3IONS-SCNC: 12 MMOL/L (ref 7–16)
APTT PPP: 27 SECONDS (ref 25.2–37.3)
AST SERPL W P-5'-P-CCNC: 26 U/L (ref 15–37)
BARBITURATES UR QL SCN: NEGATIVE
BASOPHILS # BLD AUTO: 0.03 K/UL (ref 0–0.2)
BASOPHILS NFR BLD AUTO: 0.3 % (ref 0–1)
BENZODIAZ METAB UR QL SCN: NEGATIVE
BILIRUB SERPL-MCNC: 0.5 MG/DL (ref ?–1.2)
BILIRUB UR QL STRIP: NEGATIVE
BUN SERPL-MCNC: 11 MG/DL (ref 7–18)
BUN/CREAT SERPL: 10 (ref 6–20)
CALCIUM SERPL-MCNC: 9.2 MG/DL (ref 8.5–10.1)
CANNABINOIDS UR QL SCN: NEGATIVE
CHLORIDE SERPL-SCNC: 110 MMOL/L (ref 98–107)
CLARITY UR: CLEAR
CO2 SERPL-SCNC: 20 MMOL/L (ref 21–32)
COCAINE UR QL SCN: NEGATIVE
COLOR UR: NORMAL
CREAT SERPL-MCNC: 1.13 MG/DL (ref 0.7–1.3)
DIFFERENTIAL METHOD BLD: ABNORMAL
EGFR (NO RACE VARIABLE) (RUSH/TITUS): 82 ML/MIN/1.73M2
EKG 12-LEAD: NORMAL
EOSINOPHIL # BLD AUTO: 0.06 K/UL (ref 0–0.5)
EOSINOPHIL NFR BLD AUTO: 0.6 % (ref 1–4)
ERYTHROCYTE [DISTWIDTH] IN BLOOD BY AUTOMATED COUNT: 13.5 % (ref 11.5–14.5)
GLOBULIN SER-MCNC: 3.9 G/DL (ref 2–4)
GLUCOSE SERPL-MCNC: 76 MG/DL (ref 74–106)
GLUCOSE UR STRIP-MCNC: NORMAL MG/DL
HCT VFR BLD AUTO: 46.1 % (ref 40–54)
HGB BLD-MCNC: 14.9 G/DL (ref 13.5–18)
IMM GRANULOCYTES # BLD AUTO: 0.02 K/UL (ref 0–0.04)
IMM GRANULOCYTES NFR BLD: 0.2 % (ref 0–0.4)
INR BLD: 1.04
KETONES UR STRIP-SCNC: NEGATIVE MG/DL
LEUKOCYTE ESTERASE UR QL STRIP: NEGATIVE
LYMPHOCYTES # BLD AUTO: 2.6 K/UL (ref 1–4.8)
LYMPHOCYTES NFR BLD AUTO: 27.7 % (ref 27–41)
MAGNESIUM SERPL-MCNC: 2.3 MG/DL (ref 1.7–2.3)
MCH RBC QN AUTO: 27.1 PG (ref 27–31)
MCHC RBC AUTO-ENTMCNC: 32.3 G/DL (ref 32–36)
MCV RBC AUTO: 83.8 FL (ref 80–96)
MONOCYTES # BLD AUTO: 0.53 K/UL (ref 0–0.8)
MONOCYTES NFR BLD AUTO: 5.6 % (ref 2–6)
MPC BLD CALC-MCNC: 10.3 FL (ref 9.4–12.4)
NEUTROPHILS # BLD AUTO: 6.15 K/UL (ref 1.8–7.7)
NEUTROPHILS NFR BLD AUTO: 65.6 % (ref 53–65)
NITRITE UR QL STRIP: NEGATIVE
NRBC # BLD AUTO: 0 X10E3/UL
NRBC, AUTO (.00): 0 %
NT-PROBNP SERPL-MCNC: 20 PG/ML (ref 1–125)
OPIATES UR QL SCN: NEGATIVE
PCP UR QL SCN: NEGATIVE
PH UR STRIP: 5.5 PH UNITS
PLATELET # BLD AUTO: 261 K/UL (ref 150–400)
POTASSIUM SERPL-SCNC: 3.8 MMOL/L (ref 3.5–5.1)
PR INTERVAL: NORMAL
PROT SERPL-MCNC: 7.8 G/DL (ref 6.4–8.2)
PROT UR QL STRIP: NEGATIVE
PROTHROMBIN TIME: 13.2 SECONDS (ref 11.7–14.7)
PRT AXES: NORMAL
QRS DURATION: NORMAL
QT/QTC: NORMAL
RBC # BLD AUTO: 5.5 M/UL (ref 4.6–6.2)
RBC # UR STRIP: NEGATIVE /UL
SODIUM SERPL-SCNC: 138 MMOL/L (ref 136–145)
SP GR UR STRIP: 1.01
TROPONIN I SERPL DL<=0.01 NG/ML-MCNC: 4.4 PG/ML
TROPONIN I SERPL DL<=0.01 NG/ML-MCNC: 6.9 PG/ML
UROBILINOGEN UR STRIP-ACNC: NORMAL MG/DL
VENTRICULAR RATE: NORMAL
WBC # BLD AUTO: 9.39 K/UL (ref 4.5–11)

## 2023-06-01 PROCEDURE — 83880 ASSAY OF NATRIURETIC PEPTIDE: CPT | Performed by: NURSE PRACTITIONER

## 2023-06-01 PROCEDURE — 93000 ELECTROCARDIOGRAM COMPLETE: CPT | Mod: ,,, | Performed by: FAMILY MEDICINE

## 2023-06-01 PROCEDURE — 85730 THROMBOPLASTIN TIME PARTIAL: CPT | Performed by: NURSE PRACTITIONER

## 2023-06-01 PROCEDURE — 80053 COMPREHEN METABOLIC PANEL: CPT | Performed by: NURSE PRACTITIONER

## 2023-06-01 PROCEDURE — 25000003 PHARM REV CODE 250: Performed by: NURSE PRACTITIONER

## 2023-06-01 PROCEDURE — 85025 COMPLETE CBC W/AUTO DIFF WBC: CPT | Performed by: NURSE PRACTITIONER

## 2023-06-01 PROCEDURE — 84484 ASSAY OF TROPONIN QUANT: CPT | Performed by: NURSE PRACTITIONER

## 2023-06-01 PROCEDURE — 80307 DRUG TEST PRSMV CHEM ANLYZR: CPT | Performed by: NURSE PRACTITIONER

## 2023-06-01 PROCEDURE — 99284 EMERGENCY DEPT VISIT MOD MDM: CPT | Mod: ,,, | Performed by: NURSE PRACTITIONER

## 2023-06-01 PROCEDURE — 93005 ELECTROCARDIOGRAM TRACING: CPT

## 2023-06-01 PROCEDURE — 93000 POCT EKG 12-LEAD: ICD-10-PCS | Mod: ,,, | Performed by: FAMILY MEDICINE

## 2023-06-01 PROCEDURE — 81003 URINALYSIS AUTO W/O SCOPE: CPT | Mod: 59 | Performed by: NURSE PRACTITIONER

## 2023-06-01 PROCEDURE — 99212 OFFICE O/P EST SF 10 MIN: CPT | Mod: ,,, | Performed by: FAMILY MEDICINE

## 2023-06-01 PROCEDURE — 99285 EMERGENCY DEPT VISIT HI MDM: CPT | Mod: 25

## 2023-06-01 PROCEDURE — 93010 EKG 12-LEAD: ICD-10-PCS | Mod: 77,,, | Performed by: INTERNAL MEDICINE

## 2023-06-01 PROCEDURE — 85610 PROTHROMBIN TIME: CPT | Performed by: NURSE PRACTITIONER

## 2023-06-01 PROCEDURE — 99284 PR EMERGENCY DEPT VISIT,LEVEL IV: ICD-10-PCS | Mod: ,,, | Performed by: NURSE PRACTITIONER

## 2023-06-01 PROCEDURE — 93010 ELECTROCARDIOGRAM REPORT: CPT | Mod: 77,,, | Performed by: INTERNAL MEDICINE

## 2023-06-01 PROCEDURE — 99212 PR OFFICE/OUTPT VISIT, EST, LEVL II, 10-19 MIN: ICD-10-PCS | Mod: ,,, | Performed by: FAMILY MEDICINE

## 2023-06-01 PROCEDURE — 83735 ASSAY OF MAGNESIUM: CPT | Performed by: NURSE PRACTITIONER

## 2023-06-01 RX ORDER — ASPIRIN 325 MG
325 TABLET ORAL
Status: COMPLETED | OUTPATIENT
Start: 2023-06-01 | End: 2023-06-01

## 2023-06-01 RX ORDER — ASPIRIN 325 MG
325 TABLET ORAL DAILY
Status: DISCONTINUED | OUTPATIENT
Start: 2023-06-02 | End: 2023-06-01

## 2023-06-01 RX ORDER — HYDROXYZINE PAMOATE 25 MG/1
25 CAPSULE ORAL EVERY 6 HOURS PRN
Qty: 20 CAPSULE | Refills: 0 | Status: SHIPPED | OUTPATIENT
Start: 2023-06-01 | End: 2023-09-20

## 2023-06-01 RX ADMIN — ASPIRIN 325 MG ORAL TABLET 325 MG: 325 PILL ORAL at 04:06

## 2023-06-01 NOTE — PROGRESS NOTES
Jigar Larson is a 43 y.o. male seen today for a 2 day history of worsening centralized chest discomfort described as a pressure.  Patient is currently undergoing evaluation by Cardiology for these symptoms and the patient was instructed if they returned to report to the ER.  Patient does have a cardiac catheterization scheduled next week and evidently did have an abnormality with a stress test.  Patient's EKG here in the office is normal and he is in no acute distress but due to risk for possible bad outcome patient will be transferred by family member to [facility] room for further evaluation.  The ER has been alerted.      Past Medical History:   Diagnosis Date    GERD (gastroesophageal reflux disease)     Palpitations     Rheumatoid arthritis      Family History   Problem Relation Age of Onset    Lung cancer Mother     Allergies Brother      Current Outpatient Medications on File Prior to Visit   Medication Sig Dispense Refill    busPIRone (BUSPAR) 10 MG tablet Take 1 tablet (10 mg total) by mouth 2 (two) times daily. 60 tablet 3    etanercept (ENBREL) 50 mg/mL (1 mL) injection Inject 50 mg into the skin once a week.      metoprolol succinate (TOPROL-XL) 50 MG 24 hr tablet Take 1 tablet (50 mg total) by mouth once daily. 90 tablet 1    pantoprazole (PROTONIX) 40 MG tablet Take 40 mg by mouth once daily.       No current facility-administered medications on file prior to visit.     Immunization History   Administered Date(s) Administered    COVID-19, MRNA, LN-S, PF (Pfizer) (Purple Cap) 04/09/2021, 04/30/2021    Influenza - Quadrivalent - PF *Preferred* (6 months and older) 10/14/2022       Review of Systems   Constitutional:  Negative for fever, malaise/fatigue and weight loss.   Respiratory:  Negative for shortness of breath.    Cardiovascular:  Positive for chest pain and palpitations.   Gastrointestinal:  Negative for nausea and vomiting.   Psychiatric/Behavioral:  Negative for depression.        Vitals:    06/01/23 1354   BP: (!) 138/90   Pulse:    Resp:    Temp:        Physical Exam  Vitals reviewed.   Constitutional:       Appearance: Normal appearance.   HENT:      Head: Normocephalic.   Eyes:      Extraocular Movements: Extraocular movements intact.      Conjunctiva/sclera: Conjunctivae normal.      Pupils: Pupils are equal, round, and reactive to light.   Neck:      Thyroid: No thyroid mass or thyromegaly.   Cardiovascular:      Rate and Rhythm: Normal rate and regular rhythm.      Heart sounds: Normal heart sounds. No murmur heard.    No gallop.   Pulmonary:      Effort: Pulmonary effort is normal. No respiratory distress.      Breath sounds: Normal breath sounds. No wheezing or rales.   Skin:     General: Skin is warm and dry.      Coloration: Skin is not jaundiced or pale.   Neurological:      Mental Status: He is alert.   Psychiatric:         Mood and Affect: Mood normal.         Behavior: Behavior normal.         Thought Content: Thought content normal.         Judgment: Judgment normal.        Assessment and Plan  Chest pain, unspecified type  -     POCT EKG 12-LEAD (Manually Resulted by Ordering Provider)            Return to clinic after his cardiology evaluation is done.  Patient will be transferred to [facility] room.    Health Maintenance Topics with due status: Not Due       Topic Last Completion Date    Lipid Panel 04/18/2022    Hemoglobin A1c (Diabetic Prevention Screening) 02/01/2023

## 2023-06-01 NOTE — ED PROVIDER NOTES
Encounter Date: 6/1/2023       History     Chief Complaint   Patient presents with    Chest Pain    Anxiety     Patient presents to the ER with complaint of midsternal chest pain.  He reports his symptoms started proximally 1 week ago.  He has been contributing the pain to being a panic attack.  He was scheduled for heart catheterization on 06/06/2023 with Dr. Dillon.  He was had chest pain off and on since April of this year.  He was seen last Thursday in [facility] for same complaints and had negative cardiac workup.  Ports if pain improved after that visit to the ER in [place] and the pain returned over the last 48 hours.  He describes the pain as a tightness aching in his left chest.  He denies shortness or breath.  He reports when the pain is severe he feels like his heart is racing.  He denies cough or congestion.  No nausea vomiting or diarrhea.  Recent illness or accident.  He reports he has been under lot of stress recently and has contributed his chest pain to the stress.  He was taking BuSpar and it seems to be helping his panic attacks.  Panic attacks have become almost a daily occurrence.  He was scheduled to see a psychiatrist next week.    The history is provided by the patient. No  was used.   Review of patient's allergies indicates:  No Known Allergies  Past Medical History:   Diagnosis Date    GERD (gastroesophageal reflux disease)     Palpitations     Rheumatoid arthritis      History reviewed. No pertinent surgical history.  Family History   Problem Relation Age of Onset    Lung cancer Mother     Allergies Brother      Social History     Tobacco Use    Smoking status: Never     Passive exposure: Never    Smokeless tobacco: Never   Substance Use Topics    Alcohol use: Never    Drug use: Never     Review of Systems   Constitutional:  Positive for activity change and fatigue.   Respiratory:  Positive for chest tightness.    Cardiovascular:  Positive for chest pain and  palpitations.   Psychiatric/Behavioral:  The patient is nervous/anxious.    All other systems reviewed and are negative.    Physical Exam     Initial Vitals   BP Pulse Resp Temp SpO2   06/01/23 1506 06/01/23 1507 06/01/23 1507 06/01/23 1507 06/01/23 1507   (!) 149/85 69 15 98 °F (36.7 °C) 98 %      MAP       --                Physical Exam    Nursing note and vitals reviewed.  Constitutional: He appears well-developed and well-nourished.   HENT:   Head: Normocephalic.   Right Ear: External ear normal.   Left Ear: External ear normal.   Nose: Nose normal.   Mouth/Throat: Oropharynx is clear and moist.   Eyes: Conjunctivae and EOM are normal. Pupils are equal, round, and reactive to light.   Neck: Neck supple.   Normal range of motion.  Cardiovascular:  Normal rate, regular rhythm, normal heart sounds and intact distal pulses.           Pulmonary/Chest: Breath sounds normal.   Abdominal: Abdomen is soft. Bowel sounds are normal.   Musculoskeletal:         General: Normal range of motion.      Cervical back: Normal range of motion and neck supple.     Neurological: He is alert and oriented to person, place, and time. He has normal strength. GCS score is 15. GCS eye subscore is 4. GCS verbal subscore is 5. GCS motor subscore is 6.   Skin: Skin is warm and dry. Capillary refill takes less than 2 seconds.   Psychiatric: He has a normal mood and affect. His behavior is normal. Judgment and thought content normal.       Medical Screening Exam   See Full Note    ED Course   Procedures  Labs Reviewed   COMPREHENSIVE METABOLIC PANEL - Abnormal; Notable for the following components:       Result Value    Chloride 110 (*)     CO2 20 (*)     Alk Phos 118 (*)     All other components within normal limits   CBC WITH DIFFERENTIAL - Abnormal; Notable for the following components:    Neutrophils % 65.6 (*)     Eosinophils % 0.6 (*)     All other components within normal limits   APTT - Normal   DRUG SCREEN, URINE (BEAKER) - Normal    PROTIME-INR - Normal   MAGNESIUM - Normal   TROPONIN I - Normal   NT-PRO NATRIURETIC PEPTIDE - Normal   TROPONIN I - Normal   CBC W/ AUTO DIFFERENTIAL    Narrative:     The following orders were created for panel order CBC auto differential.  Procedure                               Abnormality         Status                     ---------                               -----------         ------                     CBC with Differential[[id number]]        Abnormal            Final result                 Please view results for these tests on the individual orders.   URINALYSIS, REFLEX TO URINE CULTURE        ECG Results              EKG 12-lead (Final result)  Result time 06/03/23 09:09:32      Final result by Interface, Lab In [facility] (06/03/23 09:09:32)                   Narrative:    Test Reason : R07.9,    Vent. Rate : 053 BPM     Atrial Rate : 000 BPM     P-R Int : 130 ms          QRS Dur : 106 ms      QT Int : 428 ms       P-R-T Axes : 029 045 003 degrees     QTc Int : 416 ms    Sinus rhythm  Inferior T wave abnormality is nonspecific  Borderline ECG    Confirmed by Johnson Sage DO ([id number]) on 6/3/2023 9:09:26 AM    Referred By: [email or web address]   SELF           Confirmed By:Johnson Sage DO                                     EKG 12-lead (Final result)  Result time 06/03/23 09:11:46      Final result by Interface, Lab In [facility] (06/03/23 09:11:46)                   Narrative:    Test Reason : R07.9,    Vent. Rate : 066 BPM     Atrial Rate : 000 BPM     P-R Int : 130 ms          QRS Dur : 108 ms      QT Int : 398 ms       P-R-T Axes : 038 051 006 degrees     QTc Int : 409 ms    Sinus rhythm  Normal ECG    Confirmed by Johnson Sage DO ([id number]) on 6/3/2023 9:11:37 AM    Referred By: [email or web address]   SELF           Confirmed By:Johnson Sage DO                                  Imaging Results              X-Ray Chest AP Portable (Final result)  Result time 06/01/23 16:18:01      Final result by Vladimir Muir II,  MD (06/01/23 16:18:01)                   Impression:      No evidence of cardiopulmonary disease.      Electronically signed by: Vladimir Muir  Date:    06/01/2023  Time:    16:18               Narrative:    EXAMINATION:  XR CHEST AP PORTABLE    CLINICAL HISTORY:  Chest pain;    COMPARISON:  13 April 2023    TECHNIQUE:  XR CHEST AP PORTABLE    FINDINGS:  The heart and mediastinum are normal in size and configuration.  The pulmonary vascularity is normal in caliber.  No lung infiltrates, effusions, pneumothorax or other abnormality is demonstrated.                                       Medications   aspirin tablet 325 mg (325 mg Oral Given 6/1/23 4767)     Medical Decision Making:   Initial Assessment:   Patient presents to the ER with complaint of midsternal chest pain.  He reports his symptoms started proximally 1 week ago.  He has been contributing the pain to being a panic attack.  He was scheduled for heart catheterization on 06/06/2023 with Dr. Dillon.  He was had chest pain off and on since April of this year.  He was seen last Thursday in [facility] for same complaints and had negative cardiac workup.  Ports if pain improved after that visit to the ER in [place] and the pain returned over the last 48 hours.  He describes the pain as a tightness aching in his left chest.  He denies shortness or breath.  He reports when the pain is severe he feels like his heart is racing.  He denies cough or congestion.  No nausea vomiting or diarrhea.  Recent illness or accident.  He reports he has been under lot of stress recently and has contributed his chest pain to the stress.  He was taking BuSpar and it seems to be helping his panic attacks.  Panic attacks have become almost a daily occurrence.  He was scheduled to see a psychiatrist next week.    Differential Diagnosis:   Cardiac event  Panic attack  Electrolyte imbalance  GERD    Clinical Tests:   Lab Tests: Ordered and Reviewed  The following lab test(s) were  unremarkable: CBC, CMP, Urinalysis, PT, PTT, Troponin and BNP       <> Summary of Lab: UDS negative  Repeat troponin negative  Radiological Study: Ordered  Medical Tests: Ordered and Reviewed  ED Management:  EKG sinus rhythm rate of 66 beats per minute normal EKG reviewed by Dr. Ferraro no STEMI  Chest x-ray no acute cardiopulmonary processes.    Initiate IV collect lab work  Aspirin 325 mg p.o.    Patient is discharged home with diagnosis of chest pain and anxiety.  He is given prescription for Vistaril to use as needed for anxiety.  He is told to keep scheduled appointment with Dr. Dillon for cardiac catheterization.  He is told to follow up with Dr. Carmichael in 2 days if symptoms not improved with treatment.  He was told return to the ER with new worsening symptoms.  Other:   I have discussed this case with another health care provider.       <> Summary of the Discussion: Dr. Apple called in consult patient in history discussed in detail.  Recommendation to discharge patient at follow-up patient follow-up for scheduled cardiac catheterization.                       Clinical Impression:   Final diagnoses:  [R07.9] Chest pain  [F41.9] Anxiety (Primary)        ED Disposition Condition    Discharge Stable          ED Prescriptions       Medication Sig Dispense Start Date End Date Auth. Provider    hydrOXYzine pamoate (VISTARIL) 25 MG Cap Take 1 capsule (25 mg total) by mouth every 6 (six) hours as needed (Anxiety). 20 capsule 6/1/2023 -- ROXANNE Elizabeth          Follow-up Information       Follow up With Specialties Details Why Contact Info    Rasta Carmichael MD Family Medicine Schedule an appointment as soon as possible for a visit in 2 days If symptoms worsen [address] [facility].  [facility] [address]  [phone]      Fabio Lacy MD Interventional Cardiology, Cardiology Schedule an appointment as soon as possible for a visit in 2 days If symptoms worsen [address] [place] [id number]  [phone]               Samina Michaud, [facility]  06/04/23 0827

## 2023-06-02 NOTE — DISCHARGE INSTRUCTIONS
Take medication as needed for anxiety.  Keep scheduled follow-up with Cardiology for cardiac catheterization.  Follow-up with primary care provider in 2 days for recheck.  Return to the ER for new or worsening symptoms.

## 2023-06-05 ENCOUNTER — PATIENT MESSAGE (OUTPATIENT)
Dept: CARDIOLOGY | Facility: HOSPITAL | Age: 43
End: 2023-06-05
Payer: OTHER GOVERNMENT

## 2023-06-06 ENCOUNTER — HOSPITAL ENCOUNTER (OUTPATIENT)
Facility: HOSPITAL | Age: 43
Discharge: HOME | End: 2023-06-06
Attending: STUDENT IN AN ORGANIZED HEALTH CARE EDUCATION/TRAINING PROGRAM | Admitting: STUDENT IN AN ORGANIZED HEALTH CARE EDUCATION/TRAINING PROGRAM
Payer: OTHER GOVERNMENT

## 2023-06-06 VITALS
HEART RATE: 80 BPM | DIASTOLIC BLOOD PRESSURE: 71 MMHG | SYSTOLIC BLOOD PRESSURE: 131 MMHG | OXYGEN SATURATION: 96 % | RESPIRATION RATE: 18 BRPM

## 2023-06-06 DIAGNOSIS — Z01.818 PREOP EXAMINATION: ICD-10-CM

## 2023-06-06 DIAGNOSIS — R94.39 ABNORMAL CARDIOVASCULAR STRESS TEST: ICD-10-CM

## 2023-06-06 DIAGNOSIS — Q24.5 ANOMALOUS CORONARY ARTERY ORIGIN: Primary | ICD-10-CM

## 2023-06-06 PROCEDURE — 99153 MOD SED SAME PHYS/QHP EA: CPT | Performed by: STUDENT IN AN ORGANIZED HEALTH CARE EDUCATION/TRAINING PROGRAM

## 2023-06-06 PROCEDURE — 93458 L HRT ARTERY/VENTRICLE ANGIO: CPT | Mod: 26,,, | Performed by: STUDENT IN AN ORGANIZED HEALTH CARE EDUCATION/TRAINING PROGRAM

## 2023-06-06 PROCEDURE — 25500020 PHARM REV CODE 255: Performed by: STUDENT IN AN ORGANIZED HEALTH CARE EDUCATION/TRAINING PROGRAM

## 2023-06-06 PROCEDURE — 93458 L HRT ARTERY/VENTRICLE ANGIO: CPT | Performed by: STUDENT IN AN ORGANIZED HEALTH CARE EDUCATION/TRAINING PROGRAM

## 2023-06-06 PROCEDURE — 93458 PR CATH PLACE/CORON ANGIO, IMG SUPER/INTERP,W LEFT HEART VENTRICULOGRAPHY: ICD-10-PCS | Mod: 26,,, | Performed by: STUDENT IN AN ORGANIZED HEALTH CARE EDUCATION/TRAINING PROGRAM

## 2023-06-06 PROCEDURE — 99499 NO LOS: ICD-10-PCS | Mod: ,,, | Performed by: STUDENT IN AN ORGANIZED HEALTH CARE EDUCATION/TRAINING PROGRAM

## 2023-06-06 PROCEDURE — 99152 MOD SED SAME PHYS/QHP 5/>YRS: CPT | Performed by: STUDENT IN AN ORGANIZED HEALTH CARE EDUCATION/TRAINING PROGRAM

## 2023-06-06 PROCEDURE — C1887 CATHETER, GUIDING: HCPCS | Performed by: STUDENT IN AN ORGANIZED HEALTH CARE EDUCATION/TRAINING PROGRAM

## 2023-06-06 PROCEDURE — 99152 PR MOD CONSCIOUS SEDATION, SAME PHYS, 5+ YRS, FIRST 15 MIN: ICD-10-PCS | Mod: ,,, | Performed by: STUDENT IN AN ORGANIZED HEALTH CARE EDUCATION/TRAINING PROGRAM

## 2023-06-06 PROCEDURE — C1894 INTRO/SHEATH, NON-LASER: HCPCS | Performed by: STUDENT IN AN ORGANIZED HEALTH CARE EDUCATION/TRAINING PROGRAM

## 2023-06-06 PROCEDURE — 27201423 OPTIME MED/SURG SUP & DEVICES STERILE SUPPLY: Performed by: STUDENT IN AN ORGANIZED HEALTH CARE EDUCATION/TRAINING PROGRAM

## 2023-06-06 PROCEDURE — 99152 MOD SED SAME PHYS/QHP 5/>YRS: CPT | Mod: ,,, | Performed by: STUDENT IN AN ORGANIZED HEALTH CARE EDUCATION/TRAINING PROGRAM

## 2023-06-06 PROCEDURE — 99499 UNLISTED E&M SERVICE: CPT | Mod: ,,, | Performed by: STUDENT IN AN ORGANIZED HEALTH CARE EDUCATION/TRAINING PROGRAM

## 2023-06-06 PROCEDURE — 63600175 PHARM REV CODE 636 W HCPCS: Performed by: STUDENT IN AN ORGANIZED HEALTH CARE EDUCATION/TRAINING PROGRAM

## 2023-06-06 PROCEDURE — C1769 GUIDE WIRE: HCPCS | Performed by: STUDENT IN AN ORGANIZED HEALTH CARE EDUCATION/TRAINING PROGRAM

## 2023-06-06 PROCEDURE — 25000003 PHARM REV CODE 250: Performed by: STUDENT IN AN ORGANIZED HEALTH CARE EDUCATION/TRAINING PROGRAM

## 2023-06-06 RX ORDER — NITROGLYCERIN 5 MG/ML
INJECTION, SOLUTION INTRAVENOUS
Status: DISCONTINUED | OUTPATIENT
Start: 2023-06-06 | End: 2023-06-06 | Stop reason: HOSPADM

## 2023-06-06 RX ORDER — DIPHENHYDRAMINE HCL 25 MG
CAPSULE ORAL
Status: DISCONTINUED | OUTPATIENT
Start: 2023-06-06 | End: 2023-06-06 | Stop reason: HOSPADM

## 2023-06-06 RX ORDER — ONDANSETRON 4 MG/1
8 TABLET, ORALLY DISINTEGRATING ORAL EVERY 8 HOURS PRN
Status: DISCONTINUED | OUTPATIENT
Start: 2023-06-06 | End: 2023-06-06 | Stop reason: HOSPADM

## 2023-06-06 RX ORDER — LIDOCAINE HYDROCHLORIDE 10 MG/ML
INJECTION INFILTRATION; PERINEURAL
Status: DISCONTINUED | OUTPATIENT
Start: 2023-06-06 | End: 2023-06-06 | Stop reason: HOSPADM

## 2023-06-06 RX ORDER — VERAPAMIL HYDROCHLORIDE 2.5 MG/ML
INJECTION, SOLUTION INTRAVENOUS
Status: DISCONTINUED | OUTPATIENT
Start: 2023-06-06 | End: 2023-06-06 | Stop reason: HOSPADM

## 2023-06-06 RX ORDER — FENTANYL CITRATE 50 UG/ML
INJECTION, SOLUTION INTRAMUSCULAR; INTRAVENOUS
Status: DISCONTINUED | OUTPATIENT
Start: 2023-06-06 | End: 2023-06-06 | Stop reason: HOSPADM

## 2023-06-06 RX ORDER — SODIUM CHLORIDE 0.9 % (FLUSH) 0.9 %
2 SYRINGE (ML) INJECTION
Status: DISCONTINUED | OUTPATIENT
Start: 2023-06-06 | End: 2023-06-06 | Stop reason: HOSPADM

## 2023-06-06 RX ORDER — DIAZEPAM 5 MG/1
TABLET ORAL
Status: DISCONTINUED | OUTPATIENT
Start: 2023-06-06 | End: 2023-06-06 | Stop reason: HOSPADM

## 2023-06-06 RX ORDER — SODIUM CHLORIDE 9 MG/ML
INJECTION, SOLUTION INTRAVENOUS
Status: DISCONTINUED | OUTPATIENT
Start: 2023-06-06 | End: 2023-06-06 | Stop reason: HOSPADM

## 2023-06-06 RX ORDER — HEPARIN SODIUM 1000 [USP'U]/ML
INJECTION, SOLUTION INTRAVENOUS; SUBCUTANEOUS
Status: DISCONTINUED | OUTPATIENT
Start: 2023-06-06 | End: 2023-06-06 | Stop reason: HOSPADM

## 2023-06-06 RX ORDER — MIDAZOLAM HYDROCHLORIDE 1 MG/ML
INJECTION INTRAMUSCULAR; INTRAVENOUS
Status: DISCONTINUED | OUTPATIENT
Start: 2023-06-06 | End: 2023-06-06 | Stop reason: HOSPADM

## 2023-06-06 RX ORDER — HEPARIN SOD,PORCINE/0.9 % NACL 1000/500ML
INTRAVENOUS SOLUTION INTRAVENOUS
Status: DISCONTINUED | OUTPATIENT
Start: 2023-06-06 | End: 2023-06-06 | Stop reason: HOSPADM

## 2023-06-06 RX ORDER — ACETAMINOPHEN 325 MG/1
650 TABLET ORAL EVERY 4 HOURS PRN
Status: DISCONTINUED | OUTPATIENT
Start: 2023-06-06 | End: 2023-06-06 | Stop reason: HOSPADM

## 2023-06-06 NOTE — NURSING
1230 TR band removal completed at this time. No active bleeding to site. Pressure dressing applied.    1240 Discharge packet given to and reviewed with patient and spouse at this time. NO question. Pt verbalized understanding.

## 2023-06-06 NOTE — Clinical Note
The closure device was deployed in the right radial artery. 14 cc's of air were inserted into the closure device.

## 2023-06-06 NOTE — H&P
"[facility] - Cath Lab  History & Physical    Subjective:      Chief Complaint/Reason for Admission: chest pain    Jigar Larson is a 43 y.o. male.  Jigar Larson is a 43 y.o. male with hx of atrial fibrillation in 2010, sleep apnea compliant with cpap who presents for follow up ZIO results, previous pt of Dr. Aguirre.     He has had multiple ER and clinic visits since last seen on 4/4/23 with continued chest pain and palpitations. ZIO with no significant arrhythmia noted with 22 patient triggered events. He was recently started on buspar for anxiety and feels that it is helping a little. Discussed repeating stress test for thoroughness and to help him feel less anxious about his heart.      6/6/23 - Continues to have chest pain. Had an abnormal stress test.     4/4/2023:   Patient reports having more frequent episodes of palpitations, went to ED Sunday 4/2/2023 and workup was negative according to him. ER records and labs reviewed CBC and CMP normal, TSH normal on 11/12/2022. EKG NSR.      Patient with c/o more frequent episodes of palpitations with sob and chest tightness. States his heart rate gets as high as 160s on his apple watch but doesn't usually last very long. He has completely stopped drinking coffee and has been drinking about 64oz of water daily.      Reports there are no aggravating or alleviating factors related to these episodes but admits that when he start "feeling funny" he becomes nervous/anxious and is scared he is going to drop dead. Concerned it may possibly be panic attacks.      I had Dr. Lacy review cath films today, he feels no further ischemic evaluation is warranted at this time.           Fhx: Family history negative for sudden death.  Shx: Nonsmoker, no alcohol or drug abuse     EKG 4/4/2023: NSR with T wave inversion in III & aVF             11/8/22: NSR with T wave inversion in III & aVF, no changes when compared to previous EKG 11/8/2021 11/8/21:  Normal sinus " rhythm.     24 Holter 8/4/2020:  Maximum heart rate of 137 bpm was sinus tachycardia. Short run of nonsustained ventricular tachycardia, 5 beats. Overall PVC burden is less than 1%  ZIO monitor 4/15/2023: No significant findings with 22 patient triggered events     ECHO 8/4/2020: Normal left ventricular cavity with overall normal systolic function. LVEF 55-60%.                              No significant valvular abnormalities were noted.   [facility] 8/25/2020:  Nonobstructive coronary artery disease.                             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  Past Medical History:   Diagnosis Date    GERD (gastroesophageal reflux disease)     Palpitations     Rheumatoid arthritis      No past surgical history on file.  Family History   Problem Relation Age of Onset    Lung cancer Mother     Allergies Brother      Social History     Tobacco Use    Smoking status: Never     Passive exposure: Never    Smokeless tobacco: Never   Substance Use Topics    Alcohol use: Never    Drug use: Never       PTA Medications   Medication Sig    busPIRone (BUSPAR) 10 MG tablet Take 1 tablet (10 mg total) by mouth 2 (two) times daily.    etanercept (ENBREL) 50 mg/mL (1 mL) injection Inject 50 mg into the skin once a week.    hydrOXYzine pamoate (VISTARIL) 25 MG Cap Take 1 capsule (25 mg total) by mouth every 6 (six) hours as needed (Anxiety).    metoprolol succinate (TOPROL-XL) 50 MG 24 hr tablet Take 1 tablet (50 mg total) by mouth once daily.    pantoprazole (PROTONIX) 40 MG tablet Take 40 mg by mouth once daily.     Review of patient's allergies indicates:  No Known Allergies     Review of Systems   Respiratory:  Negative for cough and shortness of breath.    Cardiovascular:  Positive for chest pain. Negative for palpitations, orthopnea, claudication, leg swelling and PND.     Objective:      Vital Signs (Most Recent)       Vital Signs Range (Last 24H):       Physical Exam  Vitals and nursing note  reviewed.   Constitutional:       Appearance: Normal appearance.   Cardiovascular:      Rate and Rhythm: Normal rate and regular rhythm.      Pulses: Normal pulses.      Heart sounds: Normal heart sounds.   Pulmonary:      Effort: Pulmonary effort is normal.      Breath sounds: Normal breath sounds.   Musculoskeletal:      Right lower leg: No edema.      Left lower leg: No edema.   Skin:     General: Skin is warm.      Capillary Refill: Capillary refill takes less than 2 seconds.   Neurological:      Mental Status: He is alert.       Data Review:  CBC:   Lab Results   Component Value Date    WBC 9.39 06/01/2023    RBC 5.50 06/01/2023    HGB 14.9 06/01/2023    HCT 46.1 06/01/2023     06/01/2023     BMP:   Lab Results   Component Value Date    GLU 76 06/01/2023     06/01/2023    K 3.8 06/01/2023     (H) 06/01/2023    CO2 20 (L) 06/01/2023    BUN 11 06/01/2023    CREATININE 1.13 06/01/2023    CALCIUM 9.2 06/01/2023     Cardiac markers: No results found for: CKMB, TROPONINT, MYOGLOBIN   ECG: normal sinus rhythm, no blocks or conduction defects, no ischemic changes.     Assessment:      There are no hospital problems to display for this patient.      Plan:    NSVT (nonsustained ventricular tachycardia)  Single episode during ETT 8/4/2020  Echo 8/2020 normal  LHC 8/2020 normal  Reports hx of atrial fibrillation 2010     ZIO monitor 4/15/2023: Results reviewed, no significant findings with 22 patient triggered events; Max rate 159, Avg rate 79, 5 beat run of SVT  Continue Toprol XL 50mg daily     Palpitations  ZIO monitor 4/15/2023: Results reviewed, no significant findings with 22 patient triggered events; Max rate 159, Avg rate 79, 5 beat run of SVT      Continue Toprol XL to 50mg daily  Likely anxiety/panic attacks  Patient recently started on buspar, thinks this is helping a little  Schedule exercise cardiolyte for completeness and to help ease pt's anxiety about chest pain and palpitations      History of atrial fibrillation  Reports hx of afib 2010     Schedule outpatient exercise cardiolyte, will call when results are available  Follow up with me in 6 months, or sooner if needed     5/16/2023:  Heart station called, patient is over the weight limit for NM imaging (weight 311 lbs). Changed to exercise treadmill and called patient to let him know. It is scheduled for tomorrow at 9:00am, 5/17/2023; Will call results when they are available.     5/23/2023:  Exercise stress test abnormal. Discussed with Tati Lawler to schedule LHC with him and call patient.

## 2023-06-06 NOTE — Clinical Note
The catheter was inserted into the and was inserted over the wire into the left ventricle. Hemodynamics were performed.  and Pullback was recorded.  An angiography was performed of the aorta.

## 2023-06-06 NOTE — NURSING
1030 Patient rec'd to room at this time. Pt alert and oriented x3. TR band intact to right radial wrist with 14cc of air. Pulses palpable. VSS. See flowsheet for details. Surgery checks ongoing.

## 2023-06-06 NOTE — DISCHARGE SUMMARY
[facility] - Cath Lab  Discharge Note  Short Stay    Procedure(s) (LRB):  Left heart cath (N/A)      OUTCOME: Patient tolerated treatment/procedure well without complication and is now ready for discharge.    DISPOSITION: Home or Self Care    FINAL DIAGNOSIS:  Anomalous coronary artery origin    FOLLOWUP: In clinic    DISCHARGE INSTRUCTIONS:  No discharge procedures on file.     TIME SPENT ON DISCHARGE: 20 minutes

## 2023-06-06 NOTE — Clinical Note
The catheter was inserted into the and was inserted over the wire into the ostium   left main. An angiography was performed of the left coronary arteries. Multiple views were taken.

## 2023-06-12 DIAGNOSIS — Q24.5 ANOMALOUS CORONARY ARTERY ORIGIN: Primary | ICD-10-CM

## 2023-06-15 ENCOUNTER — TELEPHONE (OUTPATIENT)
Dept: CARDIOLOGY | Facility: CLINIC | Age: 43
End: 2023-06-15
Payer: OTHER GOVERNMENT

## 2023-06-15 NOTE — TELEPHONE ENCOUNTER
Pt. Notified [facility] 6-29-23@ 7:30a.m. [facility] [place]. Pt. Voiced understanding.

## 2023-07-28 ENCOUNTER — CLINICAL SUPPORT (OUTPATIENT)
Dept: PRIMARY CARE CLINIC | Facility: CLINIC | Age: 43
End: 2023-07-28

## 2023-07-28 DIAGNOSIS — Z01.10 ENCOUNTER FOR HEARING EXAMINATION, UNSPECIFIED WHETHER ABNORMAL FINDINGS: Primary | ICD-10-CM

## 2023-07-28 PROCEDURE — 92552 PR PURE TONE AUDIOMETRY, AIR: ICD-10-PCS | Mod: ,,, | Performed by: NURSE PRACTITIONER

## 2023-07-28 PROCEDURE — 92552 PURE TONE AUDIOMETRY AIR: CPT | Mod: ,,, | Performed by: NURSE PRACTITIONER

## 2023-07-28 NOTE — PROGRESS NOTES
Subjective     Patient ID: Jigar Larson is a 43 y.o. male.    Chief Complaint: No chief complaint on file.    HPI  Review of Systems       Objective     Physical Exam       Assessment and Plan     1. Encounter for hearing examination, unspecified whether abnormal findings        Audiogram only            No follow-ups on file.

## 2023-09-11 ENCOUNTER — HOSPITAL ENCOUNTER (EMERGENCY)
Facility: HOSPITAL | Age: 43
Discharge: HOME | End: 2023-09-11
Attending: EMERGENCY MEDICINE
Payer: OTHER GOVERNMENT

## 2023-09-11 VITALS
HEIGHT: 72 IN | DIASTOLIC BLOOD PRESSURE: 68 MMHG | BODY MASS INDEX: 40.63 KG/M2 | OXYGEN SATURATION: 96 % | HEART RATE: 73 BPM | SYSTOLIC BLOOD PRESSURE: 120 MMHG | RESPIRATION RATE: 13 BRPM | WEIGHT: 300 LBS | TEMPERATURE: 99 F

## 2023-09-11 DIAGNOSIS — F41.9 ANXIETY: Primary | ICD-10-CM

## 2023-09-11 DIAGNOSIS — R00.0 TACHYCARDIA: ICD-10-CM

## 2023-09-11 DIAGNOSIS — R00.2 PALPITATIONS: ICD-10-CM

## 2023-09-11 DIAGNOSIS — R07.9 CHEST PAIN, UNSPECIFIED TYPE: ICD-10-CM

## 2023-09-11 LAB
ALBUMIN SERPL BCP-MCNC: 3.5 G/DL (ref 3.5–5)
ALBUMIN/GLOB SERPL: 1.1 {RATIO}
ALP SERPL-CCNC: 103 U/L (ref 45–115)
ALT SERPL W P-5'-P-CCNC: 46 U/L (ref 16–61)
ANION GAP SERPL CALCULATED.3IONS-SCNC: 13 MMOL/L (ref 7–16)
APTT PPP: 27.2 SECONDS (ref 25.2–37.3)
AST SERPL W P-5'-P-CCNC: 33 U/L (ref 15–37)
BASOPHILS # BLD AUTO: 0.04 K/UL (ref 0–0.2)
BASOPHILS NFR BLD AUTO: 0.4 % (ref 0–1)
BILIRUB SERPL-MCNC: 0.5 MG/DL (ref ?–1.2)
BUN SERPL-MCNC: 19 MG/DL (ref 7–18)
BUN/CREAT SERPL: 18 (ref 6–20)
CALCIUM SERPL-MCNC: 8.6 MG/DL (ref 8.5–10.1)
CHLORIDE SERPL-SCNC: 110 MMOL/L (ref 98–107)
CO2 SERPL-SCNC: 21 MMOL/L (ref 21–32)
CREAT SERPL-MCNC: 1.05 MG/DL (ref 0.7–1.3)
DIFFERENTIAL METHOD BLD: ABNORMAL
EGFR (NO RACE VARIABLE) (RUSH/TITUS): 89 ML/MIN/1.73M2
EOSINOPHIL # BLD AUTO: 0.07 K/UL (ref 0–0.5)
EOSINOPHIL NFR BLD AUTO: 0.8 % (ref 1–4)
ERYTHROCYTE [DISTWIDTH] IN BLOOD BY AUTOMATED COUNT: 13.9 % (ref 11.5–14.5)
GLOBULIN SER-MCNC: 3.3 G/DL (ref 2–4)
GLUCOSE SERPL-MCNC: 164 MG/DL (ref 74–106)
HCT VFR BLD AUTO: 40 % (ref 40–54)
HGB BLD-MCNC: 13.2 G/DL (ref 13.5–18)
IMM GRANULOCYTES # BLD AUTO: 0.04 K/UL (ref 0–0.04)
IMM GRANULOCYTES NFR BLD: 0.4 % (ref 0–0.4)
INR BLD: 1.02
LYMPHOCYTES # BLD AUTO: 1.77 K/UL (ref 1–4.8)
LYMPHOCYTES NFR BLD AUTO: 19.8 % (ref 27–41)
MAGNESIUM SERPL-MCNC: 2 MG/DL (ref 1.7–2.3)
MCH RBC QN AUTO: 26.4 PG (ref 27–31)
MCHC RBC AUTO-ENTMCNC: 33 G/DL (ref 32–36)
MCV RBC AUTO: 80 FL (ref 80–96)
MONOCYTES # BLD AUTO: 0.41 K/UL (ref 0–0.8)
MONOCYTES NFR BLD AUTO: 4.6 % (ref 2–6)
MPC BLD CALC-MCNC: 10.4 FL (ref 9.4–12.4)
NEUTROPHILS # BLD AUTO: 6.59 K/UL (ref 1.8–7.7)
NEUTROPHILS NFR BLD AUTO: 74 % (ref 53–65)
NRBC # BLD AUTO: 0 X10E3/UL
NRBC, AUTO (.00): 0 %
NT-PROBNP SERPL-MCNC: 29 PG/ML (ref 1–125)
PLATELET # BLD AUTO: 243 K/UL (ref 150–400)
POTASSIUM SERPL-SCNC: 3.8 MMOL/L (ref 3.5–5.1)
PROT SERPL-MCNC: 6.8 G/DL (ref 6.4–8.2)
PROTHROMBIN TIME: 13.3 SECONDS (ref 11.7–14.7)
RBC # BLD AUTO: 5 M/UL (ref 4.6–6.2)
SODIUM SERPL-SCNC: 140 MMOL/L (ref 136–145)
TROPONIN I SERPL DL<=0.01 NG/ML-MCNC: 5.7 PG/ML
TROPONIN I SERPL DL<=0.01 NG/ML-MCNC: 7.7 PG/ML
WBC # BLD AUTO: 8.92 K/UL (ref 4.5–11)

## 2023-09-11 PROCEDURE — 99284 EMERGENCY DEPT VISIT MOD MDM: CPT | Mod: GC,,, | Performed by: EMERGENCY MEDICINE

## 2023-09-11 PROCEDURE — 80053 COMPREHEN METABOLIC PANEL: CPT | Performed by: EMERGENCY MEDICINE

## 2023-09-11 PROCEDURE — 99285 EMERGENCY DEPT VISIT HI MDM: CPT | Mod: 25

## 2023-09-11 PROCEDURE — 93010 EKG 12-LEAD: ICD-10-PCS | Mod: ,,, | Performed by: HOSPITALIST

## 2023-09-11 PROCEDURE — 85025 COMPLETE CBC W/AUTO DIFF WBC: CPT | Performed by: EMERGENCY MEDICINE

## 2023-09-11 PROCEDURE — 85610 PROTHROMBIN TIME: CPT | Performed by: EMERGENCY MEDICINE

## 2023-09-11 PROCEDURE — 99284 PR EMERGENCY DEPT VISIT,LEVEL IV: ICD-10-PCS | Mod: GC,,, | Performed by: EMERGENCY MEDICINE

## 2023-09-11 PROCEDURE — 93005 ELECTROCARDIOGRAM TRACING: CPT

## 2023-09-11 PROCEDURE — 83880 ASSAY OF NATRIURETIC PEPTIDE: CPT | Performed by: EMERGENCY MEDICINE

## 2023-09-11 PROCEDURE — 83735 ASSAY OF MAGNESIUM: CPT | Performed by: EMERGENCY MEDICINE

## 2023-09-11 PROCEDURE — 85730 THROMBOPLASTIN TIME PARTIAL: CPT | Performed by: EMERGENCY MEDICINE

## 2023-09-11 PROCEDURE — 93010 ELECTROCARDIOGRAM REPORT: CPT | Mod: ,,, | Performed by: HOSPITALIST

## 2023-09-11 PROCEDURE — 84484 ASSAY OF TROPONIN QUANT: CPT | Performed by: EMERGENCY MEDICINE

## 2023-09-11 PROCEDURE — 84484 ASSAY OF TROPONIN QUANT: CPT

## 2023-09-11 NOTE — ED PROVIDER NOTES
Encounter Date: 9/11/2023       History     Chief Complaint   Patient presents with    Tachycardia    Chest Pain     Patient is a 42yo male with a PMH of Afib in 2010, NSVT and nonobstructive CAD in 2020, THIAGO on CPAP, and anxiety who presents to the ED with CP, tachycardia, and palpitations. He has had similar episodes and ED visits in the past. 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 Patient then had cardiac CTA on 6/29/23 and exam was unremarkable. Patient has been on Toprol-XL 50 qd since. He has a history of anxiety and sees NP at [facility] counseling services. He takes Vilazodone 20 qd for the anxiety and depression. He took Buspar and Vistaril in the past without relief. Patient reports being compliant with his medications. He reports drinking a cup of coffee and a cup of green tea earlier today which might have contributed to symptoms. Symptoms have resolved since arrival at the ED. He denies diaphoresis, radiation of chest pain, headache, vision changes, f/c/sob/n/v/d. Denies abdominal pain, urinary or bowel habit changes.      Review of patient's allergies indicates:  No Known Allergies  Past Medical History:   Diagnosis Date    Anxiety disorder, unspecified     GERD (gastroesophageal reflux disease)     Palpitations     Rheumatoid arthritis     Sleep apnea      Past Surgical History:   Procedure Laterality Date    LEFT HEART CATHETERIZATION N/A 06/06/2023    Procedure: Left heart cath;  Surgeon: Fabio Lacy MD;  Location: [facility] CATH LAB;  Service: Cardiology;  Laterality: N/A;    LIPOMA RESECTION       Family History   Problem Relation Age of Onset    Lung cancer Mother     Allergies Brother      Social History     Tobacco Use    Smoking status: Never     Passive exposure: Never    Smokeless tobacco: Never   Substance Use Topics    Alcohol use: Never    Drug use: Never     Review of Systems    Constitutional:  Negative for activity change, appetite change, chills, diaphoresis and fever.   HENT:  Negative for trouble swallowing.    Eyes:  Negative for visual disturbance.   Respiratory:  Negative for shortness of breath.    Cardiovascular:  Positive for chest pain and palpitations.        Tachycardia    Gastrointestinal:  Negative for abdominal pain, diarrhea, nausea and vomiting.   Genitourinary:  Negative for difficulty urinating and dysuria.   Musculoskeletal:  Negative for back pain and neck pain.   Skin:  Negative for rash.   Neurological:  Negative for dizziness, syncope and weakness.   Psychiatric/Behavioral:  Negative for sleep disturbance.        Physical Exam     Initial Vitals [09/11/23 1257]   BP Pulse Resp Temp SpO2   (!) 141/81 98 15 98.5 °F (36.9 °C) 96 %      MAP       --         Physical Exam    Nursing note and vitals reviewed.  Constitutional: He appears well-developed and well-nourished. He is not diaphoretic. No distress.   HENT:   Head: Normocephalic and atraumatic.   Right Ear: External ear normal.   Left Ear: External ear normal.   Nose: Nose normal.   Mouth/Throat: No oropharyngeal exudate.   Eyes: EOM are normal. Pupils are equal, round, and reactive to light. Right eye exhibits no discharge. Left eye exhibits no discharge. No scleral icterus.   Neck: Neck supple.   Cardiovascular:  Normal rate and regular rhythm.           Pulmonary/Chest: Breath sounds normal. No respiratory distress. He has no wheezes. He exhibits no tenderness.   Abdominal: Abdomen is soft. There is no abdominal tenderness. There is no rebound and no guarding.   Musculoskeletal:         General: No tenderness or edema.      Cervical back: Neck supple.     Neurological: He is alert and oriented to person, place, and time. No sensory deficit.   Skin: Skin is warm and dry. No rash noted. No erythema.   Psychiatric: His behavior is normal. Judgment and thought content normal.         Medical Screening Exam   See  Full Note    ED Course   Procedures  Labs Reviewed   COMPREHENSIVE METABOLIC PANEL - Abnormal; Notable for the following components:       Result Value    Chloride 110 (*)     Glucose 164 (*)     BUN 19 (*)     All other components within normal limits   CBC WITH DIFFERENTIAL - Abnormal; Notable for the following components:    Hemoglobin 13.2 (*)     MCH 26.4 (*)     Neutrophils % 74.0 (*)     Lymphocytes % 19.8 (*)     Eosinophils % 0.8 (*)     All other components within normal limits   NT-PRO NATRIURETIC PEPTIDE - Normal   APTT - Normal   PROTIME-INR - Normal   MAGNESIUM - Normal   TROPONIN I - Normal   TROPONIN I - Normal   CBC W/ AUTO DIFFERENTIAL    Narrative:     The following orders were created for panel order CBC auto differential.  Procedure                               Abnormality         Status                     ---------                               -----------         ------                     CBC with Differential[[id number]]        Abnormal            Final result                 Please view results for these tests on the individual orders.     EKG Readings: (Independently Interpreted)   Rhythm: Sinus Tachycardia. Heart Rate: 103.   T wave inversion in lead III. Similar to previous EKGs     ECG Results              EKG 12-lead (Final result)  Result time 09/12/23 19:44:41      Final result by Interface, Lab In [facility] (09/12/23 19:44:41)                   Narrative:    Test Reason : R00.2,    Vent. Rate : 103 BPM     Atrial Rate : 000 BPM     P-R Int : 094 ms          QRS Dur : 108 ms      QT Int : 344 ms       P-R-T Axes : 065 060 009 degrees     QTc Int : 419 ms    Sinus tachycardia  Short WI interval  Inferior T wave abnormality is nonspecific  Borderline ECG    Confirmed by Jay PADILLA, Chitra CATHERINE ([id number]) on 9/12/2023 7:44:30 PM    Referred By: IVAN   SELF           Confirmed By:Chitra Thompson MD                                  Imaging Results              X-Ray Chest AP Portable  (Final result)  Result time 09/11/23 13:54:09      Final result by Clive Lozano MD (09/11/23 13:54:09)                   Impression:      No acute findings.      Electronically signed by: Clive Lozano  Date:    09/11/2023  Time:    13:54               Narrative:    EXAMINATION:  XR CHEST AP PORTABLE    CLINICAL HISTORY:  Palpitations    TECHNIQUE:  Single frontal view of the chest was performed.    COMPARISON:  06/01/2023    FINDINGS:  Heart size normal. Lungs clear. No pneumothorax or pleural effusion.                                       Medications - No data to display  Medical Decision Making  Patient presents to ED with chest pain, palpitations, and heart racing for about an hour. Symptoms have resolved since arriving at the ED. He has had similar episodes and ED visits in the past. 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 Patient had cardiac CTA on 6/29/23 and exam was unremarkable. Patient has been on Toprol-XL 50 qd since. He has a history of anxiety and sees NP at [facility] counseling services. He takes Vilazodone 20 qd for the anxiety and depression.     Workup including CBC, CMP, PT/INR, PTT, Mg, Troponins x2, and BNP have been WNL. Troponins 5.7, 7.7, BNP 29. CXR negative for acute findings and EKG sinus tach 103 with T wave inversion in lead III largely unchanged from previous EKGs. Symptoms could be from anxiety and drinking coffee and tea earlier today. Patient advised to decrease and avoid caffeine intake. He voiced understanding. He'll followup with PCP in 1 week, and Cardiology on 11/7/23. He will followup with Psych NP every 3 months and therapist monthly. He was told to return to ED if symptoms worsen. Patient will be discharged home.     Amount and/or Complexity of Data Reviewed  Labs: ordered. Decision-making details documented in ED Course.  Radiology: ordered. Decision-making details documented in ED  Course.  ECG/medicine tests: ordered. Decision-making details documented in ED Course.              Attending Attestation:   Physician Attestation Statement for Resident:  As the supervising MD   Physician Attestation Statement: I have personally seen and examined this patient.   I agree with the above history.  -:   As the supervising MD I agree with the above PE.     As the supervising MD I agree with the above treatment, course, plan, and disposition.                    ED Course as of 10/12/23 1759   Mon Sep 11, 2023   1653 EKG and troponins x2 WNL. See MDM. [HW]      ED Course User Index  [HW] James Mendoza DO                    Clinical Impression:   Final diagnoses:  [R00.2] Palpitations  [R00.0] Tachycardia  [R07.9] Chest pain, unspecified type  [F41.9] Anxiety (Primary)        ED Disposition Condition    Discharge Stable          ED Prescriptions    None       Follow-up Information       Follow up With Specialties Details Why Contact Info    Rasta Carmichael MD Family Medicine Schedule an appointment as soon as possible for a visit in 1 week for chest pain vs anxiety f/u [address] [facility].  [facility] [id number]  [phone]               Luis Manuel Bustillos MD  10/12/23 1759

## 2023-09-11 NOTE — ED TRIAGE NOTES
Presents to ED c/o tachycardia that began while he was driving after eating lunch. Patient states he was driving and he started feeling like his heart was racing. See's cardiology and had negative workup, take Metoprolol. Patient also reports hx of anxiety but reports it is well controlled and this does not feel like anxiety.

## 2023-09-12 ENCOUNTER — TELEPHONE (OUTPATIENT)
Dept: EMERGENCY MEDICINE | Facility: HOSPITAL | Age: 43
End: 2023-09-12
Payer: OTHER GOVERNMENT

## 2023-09-17 ENCOUNTER — HOSPITAL ENCOUNTER (EMERGENCY)
Facility: HOSPITAL | Age: 43
Discharge: HOME | End: 2023-09-18
Attending: EMERGENCY MEDICINE
Payer: OTHER GOVERNMENT

## 2023-09-17 DIAGNOSIS — R07.9 CHEST PAIN, UNSPECIFIED TYPE: Primary | ICD-10-CM

## 2023-09-17 DIAGNOSIS — R06.02 SOB (SHORTNESS OF BREATH): ICD-10-CM

## 2023-09-17 PROCEDURE — 93005 ELECTROCARDIOGRAM TRACING: CPT

## 2023-09-17 PROCEDURE — 93010 EKG 12-LEAD: ICD-10-PCS | Mod: ,,, | Performed by: INTERNAL MEDICINE

## 2023-09-17 PROCEDURE — 99284 EMERGENCY DEPT VISIT MOD MDM: CPT | Mod: ,,, | Performed by: EMERGENCY MEDICINE

## 2023-09-17 PROCEDURE — 93010 ELECTROCARDIOGRAM REPORT: CPT | Mod: ,,, | Performed by: INTERNAL MEDICINE

## 2023-09-17 PROCEDURE — 99284 PR EMERGENCY DEPT VISIT,LEVEL IV: ICD-10-PCS | Mod: ,,, | Performed by: EMERGENCY MEDICINE

## 2023-09-17 PROCEDURE — 99285 EMERGENCY DEPT VISIT HI MDM: CPT

## 2023-09-18 VITALS
DIASTOLIC BLOOD PRESSURE: 83 MMHG | RESPIRATION RATE: 20 BRPM | HEART RATE: 77 BPM | BODY MASS INDEX: 40.36 KG/M2 | WEIGHT: 298 LBS | OXYGEN SATURATION: 97 % | HEIGHT: 72 IN | SYSTOLIC BLOOD PRESSURE: 143 MMHG | TEMPERATURE: 99 F

## 2023-09-18 LAB
D DIMER PPP FEU-MCNC: 0.81 ΜG/ML (ref 0–0.47)
TROPONIN I SERPL DL<=0.01 NG/ML-MCNC: 6.6 PG/ML

## 2023-09-18 PROCEDURE — 25500020 PHARM REV CODE 255: Performed by: EMERGENCY MEDICINE

## 2023-09-18 PROCEDURE — 84484 ASSAY OF TROPONIN QUANT: CPT | Performed by: EMERGENCY MEDICINE

## 2023-09-18 PROCEDURE — 85379 FIBRIN DEGRADATION QUANT: CPT | Performed by: EMERGENCY MEDICINE

## 2023-09-18 RX ADMIN — IOPAMIDOL 100 ML: 755 INJECTION, SOLUTION INTRAVENOUS at 03:09

## 2023-09-18 NOTE — ED PROVIDER NOTES
Encounter Date: 9/17/2023       History     Chief Complaint   Patient presents with    Chest Pain     L sided chest pain that radiates to the L arm . Pain started yesterday.     A 43-year-old male patient came to the emergency department complaining of chest pain.  The patient states that his pain started yesterday radiating to his left arm.  The patient has past medical history significant of rheumatoid arthritis.  He also has history of left heart catheterization previously.  The patient denies fever or chills.  The patient denies nausea or vomiting.  The patient denies back pain.    The history is provided by the patient. No  was used.     Review of patient's allergies indicates:  No Known Allergies  Past Medical History:   Diagnosis Date    GERD (gastroesophageal reflux disease)     Palpitations     Rheumatoid arthritis      Past Surgical History:   Procedure Laterality Date    LEFT HEART CATHETERIZATION N/A 6/6/2023    Procedure: Left heart cath;  Surgeon: Fabio Lacy MD;  Location: [facility] CATH LAB;  Service: Cardiology;  Laterality: N/A;     Family History   Problem Relation Age of Onset    Lung cancer Mother     Allergies Brother      Social History     Tobacco Use    Smoking status: Never     Passive exposure: Never    Smokeless tobacco: Never   Substance Use Topics    Alcohol use: Never    Drug use: Never     Review of Systems   Constitutional:  Negative for fever.   HENT:  Negative for sore throat.    Respiratory:  Negative for shortness of breath.    Cardiovascular:  Positive for chest pain.   Gastrointestinal:  Negative for nausea.   Genitourinary:  Negative for dysuria.   Musculoskeletal:  Negative for back pain.   Skin:  Negative for rash.   Neurological:  Negative for weakness.   Hematological:  Does not bruise/bleed easily.       Physical Exam     Initial Vitals [09/17/23 2040]   BP Pulse Resp Temp SpO2   (!) 143/83 77 20 98.8 °F (37.1 °C) 97 %      MAP       --          Physical Exam    Nursing note and vitals reviewed.  Constitutional: He appears well-developed and well-nourished.   Obese male patient   HENT:   Head: Normocephalic and atraumatic.   Eyes: EOM are normal. Pupils are equal, round, and reactive to light.   Neck: Neck supple. No thyromegaly present.   Normal range of motion.  Cardiovascular:  Normal rate, regular rhythm, normal heart sounds and intact distal pulses.           No murmur heard.  Pulmonary/Chest: Breath sounds normal. No respiratory distress. He has no wheezes.   Abdominal: Abdomen is soft. Bowel sounds are normal. There is no abdominal tenderness.   Musculoskeletal:         General: No tenderness or edema. Normal range of motion.      Cervical back: Normal range of motion and neck supple.     Lymphadenopathy:     He has no cervical adenopathy.   Neurological: He is alert and oriented to person, place, and time. He has normal strength and normal reflexes. No cranial nerve deficit or sensory deficit. GCS score is 15. GCS eye subscore is 4. GCS verbal subscore is 5. GCS motor subscore is 6.   Skin: Skin is warm and dry. Capillary refill takes less than 2 seconds. No rash noted.   Psychiatric: He has a normal mood and affect.         Medical Screening Exam   See Full Note    ED Course   Procedures  Labs Reviewed   D DIMER, QUANTITATIVE - Abnormal; Notable for the following components:       Result Value    D-Dimer 0.81 (*)     All other components within normal limits   TROPONIN I - Normal     EKG Readings: (Independently Interpreted)   Initial Reading: No STEMI. Rhythm: Normal Sinus Rhythm.     ECG Results              EKG 12-lead (Final result)  Result time 09/18/23 13:57:51      Final result by Interface, Lab In [facility] (09/18/23 13:57:51)                   Narrative:    Test Reason : R06.02,    Vent. Rate : 070 BPM     Atrial Rate : 070 BPM     P-R Int : 130 ms          QRS Dur : 102 ms      QT Int : 386 ms       P-R-T Axes : 041 065 011 degrees      QTc Int : 416 ms    Normal sinus rhythm  Normal ECG  When compared with ECG of 11-SEP-2023 12:51,  PREVIOUS ECG IS PRESENT  Confirmed by Tashia PADILLA, Randal ZUNIGA ([id number]) on 9/18/2023 1:57:39 PM    Referred By: [email or web address]   SELF           Confirmed By:Randal Lao MD                                  Imaging Results              CTA Chest Non-Coronary (PE Studies) (Final result)  Result time 09/18/23 07:48:22      Final result by Clive Lozano MD (09/18/23 07:48:22)                   Impression:      No pulmonary embolus or acute abnormality in the chest.      Electronically signed by: Clive Lozano  Date:    09/18/2023  Time:    07:48               Narrative:    EXAMINATION:  CTA CHEST NON CORONARY (PE STUDIES)    CLINICAL HISTORY:  Pulmonary embolism (PE) suspected, neg D-dimer;    TECHNIQUE:  Low dose axial images, sagittal and coronal reformations were obtained from the thoracic inlet to the lung bases following the IV administration of 100 mL of Isovue 370.  Contrast timing was optimized to evaluate the pulmonary arteries.  MIP images were performed.    The CT examination was performed using one or more of the following dose reduction techniques: Automated exposure control, adjustment of the mA and kV according to patient's size, use of acute or iterative reconstruction techniques.    COMPARISON:  Chest radiograph 09/18/2023    FINDINGS:  No pulmonary embolus.    Heart and thoracic aorta normal in appearance.  No adenopathy.    Lungs are clear.  No pneumothorax.  No pleural effusion.    No acute abnormality of the upper abdomen.  No acute fracture.                                       X-Ray Chest AP Portable (Final result)  Result time 09/18/23 07:49:19      Final result by Vladimir Muir II, MD (09/18/23 07:49:19)                   Impression:      No evidence of cardiopulmonary disease.      Electronically signed by: Vladimir Muir  Date:    09/18/2023  Time:    07:49               Narrative:     EXAMINATION:  XR CHEST AP PORTABLE    CLINICAL HISTORY:  Chest pain;    COMPARISON:  11 September 2023    TECHNIQUE:  XR CHEST AP PORTABLE    FINDINGS:  The heart and mediastinum are normal in size and configuration.  The pulmonary vascularity is normal in caliber.  No lung infiltrates, effusions, pneumothorax or other abnormality is demonstrated.                                       Medications   iopamidoL (ISOVUE-370) injection 100 mL (100 mLs Intravenous Given 9/18/23 0347)     Medical Decision Making  A 43-year-old male patient came to the emergency department complaining of chest pain.  The patient states that his pain started yesterday radiating to his left arm.  The patient has past medical history significant of rheumatoid arthritis.  He also has history of left heart catheterization previously.  The patient denies fever or chills.  The patient denies nausea or vomiting.  The patient denies back pain.    Physical examination is normal       Amount and/or Complexity of Data Reviewed  Radiology: ordered. Decision-making details documented in ED Course.     Details: Chest CTA is negative for PE  Discussion of management or test interpretation with external provider(s): Given the large differential diagnosis for Jigar Larson, the decision making in this case is of high complexity.    After evaluating all of the data points in this case, the presentation of Jigar Larson is NOT consistent with Acute Coronary Syndrome (ACS) and/or myocardial ischemia, pulmonary embolism, aortic dissection; Borhaave's, significant arrythmia, pneumothorax, cardiac tamponade, or other emergent cardiopulmonary condition.    Further, the presentation of Jigar Larson is NOT consistent with pericarditis, myocarditis, cholecystitis, pancreatitis, mediastinitis, endocarditis, new valvular disease.    Additionally, the presentation of Jigar Larson is NOT consistent with flail chest, cardiac contusion, ARDS, or significant intra-thoracic or  intra-abdominal bleeding.    Similarly, this presentation is NOT consistent with pneumonia, sepsis, or pyelonephritis.    Strict return and follow-up precautions have been given by me personally to the patient/family/caregiver(s).    Data Reviewed/Counseling: I have reviewed the patient's vital signs, nursing notes, and other relevant tests/information. I had a detailed discussion regarding the historical points, exam findings, and any diagnostic results supporting the discharge diagnosis. I also discussed the need for outpatient follow-up and the need to return to the ED if symptoms worsen or if there are any questions or concerns that arise at home.       Risk  Prescription drug management.                               Clinical Impression:   Final diagnoses:  [R07.9] Chest pain, unspecified type (Primary)  [R06.02] SOB (shortness of breath)        ED Disposition Condition    Discharge Stable          ED Prescriptions    None       Follow-up Information    None          Robert Smith MD  09/18/23 5117

## 2023-09-19 ENCOUNTER — ANESTHESIA EVENT (OUTPATIENT)
Dept: GASTROENTEROLOGY | Facility: HOSPITAL | Age: 43
End: 2023-09-19
Payer: OTHER GOVERNMENT

## 2023-09-19 ENCOUNTER — HOSPITAL ENCOUNTER (OUTPATIENT)
Dept: GASTROENTEROLOGY | Facility: HOSPITAL | Age: 43
Discharge: HOME | End: 2023-09-19
Attending: INTERNAL MEDICINE
Payer: OTHER GOVERNMENT

## 2023-09-19 ENCOUNTER — ANESTHESIA (OUTPATIENT)
Dept: GASTROENTEROLOGY | Facility: HOSPITAL | Age: 43
End: 2023-09-19
Payer: OTHER GOVERNMENT

## 2023-09-19 VITALS
SYSTOLIC BLOOD PRESSURE: 96 MMHG | DIASTOLIC BLOOD PRESSURE: 58 MMHG | OXYGEN SATURATION: 96 % | HEART RATE: 74 BPM | RESPIRATION RATE: 14 BRPM | WEIGHT: 299 LBS | BODY MASS INDEX: 40.55 KG/M2 | TEMPERATURE: 98 F

## 2023-09-19 DIAGNOSIS — Z12.11 COLON CANCER SCREENING: ICD-10-CM

## 2023-09-19 PROCEDURE — 63600175 PHARM REV CODE 636 W HCPCS: Performed by: NURSE ANESTHETIST, CERTIFIED REGISTERED

## 2023-09-19 PROCEDURE — 45385 PR COLONOSCOPY,REMV LESN,SNARE: ICD-10-PCS | Mod: 33,,, | Performed by: INTERNAL MEDICINE

## 2023-09-19 PROCEDURE — D9220A PRA ANESTHESIA: Mod: 33,,, | Performed by: NURSE ANESTHETIST, CERTIFIED REGISTERED

## 2023-09-19 PROCEDURE — 25000003 PHARM REV CODE 250: Performed by: NURSE ANESTHETIST, CERTIFIED REGISTERED

## 2023-09-19 PROCEDURE — 88305 TISSUE EXAM BY PATHOLOGIST: CPT | Mod: 26,,, | Performed by: PATHOLOGY

## 2023-09-19 PROCEDURE — 27201423 OPTIME MED/SURG SUP & DEVICES STERILE SUPPLY

## 2023-09-19 PROCEDURE — D9220A PRA ANESTHESIA: ICD-10-PCS | Mod: 33,,, | Performed by: NURSE ANESTHETIST, CERTIFIED REGISTERED

## 2023-09-19 PROCEDURE — 45385 COLONOSCOPY W/LESION REMOVAL: CPT | Mod: PT | Performed by: INTERNAL MEDICINE

## 2023-09-19 PROCEDURE — 88305 TISSUE EXAM BY PATHOLOGIST: CPT | Mod: TC,SUR | Performed by: INTERNAL MEDICINE

## 2023-09-19 PROCEDURE — 88305 SURGICAL PATHOLOGY: ICD-10-PCS | Mod: 26,,, | Performed by: PATHOLOGY

## 2023-09-19 PROCEDURE — 37000009 HC ANESTHESIA EA ADD 15 MINS

## 2023-09-19 PROCEDURE — 45385 COLONOSCOPY W/LESION REMOVAL: CPT | Mod: 33,,, | Performed by: INTERNAL MEDICINE

## 2023-09-19 PROCEDURE — 37000008 HC ANESTHESIA 1ST 15 MINUTES

## 2023-09-19 RX ORDER — SODIUM CHLORIDE 0.9 % (FLUSH) 0.9 %
10 SYRINGE (ML) INJECTION
Status: DISCONTINUED | OUTPATIENT
Start: 2023-09-19 | End: 2023-09-20 | Stop reason: HOSPADM

## 2023-09-19 RX ORDER — LIDOCAINE HYDROCHLORIDE 20 MG/ML
INJECTION, SOLUTION EPIDURAL; INFILTRATION; INTRACAUDAL; PERINEURAL
Status: DISCONTINUED | OUTPATIENT
Start: 2023-09-19 | End: 2023-09-19

## 2023-09-19 RX ORDER — PROPOFOL 10 MG/ML
VIAL (ML) INTRAVENOUS
Status: DISCONTINUED | OUTPATIENT
Start: 2023-09-19 | End: 2023-09-19

## 2023-09-19 RX ORDER — VILAZODONE HYDROCHLORIDE 20 MG/1
20 TABLET ORAL DAILY
COMMUNITY

## 2023-09-19 RX ADMIN — PROPOFOL 50 MG: 10 INJECTION, EMULSION INTRAVENOUS at 09:09

## 2023-09-19 RX ADMIN — LIDOCAINE HYDROCHLORIDE 50 MG: 20 INJECTION, SOLUTION INTRAVENOUS at 09:09

## 2023-09-19 RX ADMIN — SODIUM CHLORIDE: 9 INJECTION, SOLUTION INTRAVENOUS at 09:09

## 2023-09-19 NOTE — ANESTHESIA PREPROCEDURE EVALUATION
09/19/2023  Jigar Larson is a 43 y.o., male.      Pre-op Assessment    I have reviewed the Patient Summary Reports.     I have reviewed the Nursing Notes. I have reviewed the NPO Status.   I have reviewed the Medications.     Review of Systems  Anesthesia Hx:  No problems with previous Anesthesia  Denies Family Hx of Anesthesia complications.   Denies Personal Hx of Anesthesia complications.   Social:  Non-Smoker    Hematology/Oncology:  Hematology Normal   Oncology Normal     EENT/Dental:EENT/Dental Normal   Cardiovascular:   CAD  Dysrhythmias atrial fibrillation ECG has been reviewed.    Pulmonary:  Pulmonary Normal    Renal/:  Renal/ Normal     Hepatic/GI:   Bowel Prep. GERD    Musculoskeletal:  Musculoskeletal Normal    Neurological:  Neurology Normal    Endocrine:  Obesity / BMI > 30  Dermatological:  Skin Normal    Psych:  Psychiatric Normal           Physical Exam  General: Well nourished    Airway:  Mallampati: II   Mouth Opening: Normal  TM Distance: Normal  Tongue: Normal  Neck ROM: Normal ROM    Dental:  Intact        Anesthesia Plan  Type of Anesthesia, risks & benefits discussed:    Anesthesia Type: Gen Natural Airway  Intra-op Monitoring Plan: Standard ASA Monitors  Post Op Pain Control Plan: multimodal analgesia  Induction:  IV  Informed Consent: Informed consent signed with the Patient and all parties understand the risks and agree with anesthesia plan.  All questions answered. Patient consented to blood products? Yes  ASA Score: 3  Day of Surgery Review of History & Physical: H&P Update referred to the surgeon/provider.I have interviewed and examined the patient. I have reviewed the patient's H&P dated: There are no significant changes.     Ready For Surgery From Anesthesia Perspective.     .

## 2023-09-19 NOTE — DISCHARGE INSTRUCTIONS
Procedure Date  9/19/23     Impression  Overall Impression:   Subcentimeter polyp in the sigmoid colon was removed with cold snare  The ileocecal valve, cecum, ascending colon and transverse colon appeared normal.  Scattered diverticulosis of mild severity in the descending colon and sigmoid colon  (grade 2) hemorrhoids        Recommendation    Await pathology results     Repeat colonoscopy in 7 years         Outcome of procedure: successful Colonoscopy  Disposition: patient to recovery following procedure; discharge to home when appropriate parameters met  Provisions for follow up: please call my office for any unexpected symptoms like chest or abdominal pain or bleeding following your procedure.  Final Diagnosis: colon polyp     THE NURSE WILL CALL YOU WITH YOUR BIOPSY RESULTS IN A FEW DAYS.     NO DRIVING, OPERATING EQUIPMENT, OR SIGNING LEGAL DOCUMENTS FOR 24 HOURS.

## 2023-09-19 NOTE — TRANSFER OF CARE
Anesthesia Transfer of Care Note    Patient: Jigar Larson    Procedure(s) Performed: Colonoscopy    Patient location: GI    Anesthesia Type: general    Transport from OR: Transported from OR on room air with adequate spontaneous ventilation. Continuous ECG monitoring in transport. Continuous SpO2 monitoring in transport    Post pain: adequate analgesia    Post assessment: no apparent anesthetic complications    Post vital signs: stable    Level of consciousness: responds to stimulation, awake and sedated    Nausea/Vomiting: no nausea/vomiting    Complications: none    Transfer of care protocol was followed      Last vitals:   Visit Vitals  BP (!) 95/50 (BP Location: Right arm, Patient Position: Lying)   Pulse 88   Temp 36.6 °C (97.9 °F) (Oral)   Resp 17   Wt 135.6 kg (299 lb)   SpO2 (!) 94%   BMI 40.55 kg/m²

## 2023-09-19 NOTE — ANESTHESIA POSTPROCEDURE EVALUATION
Anesthesia Post Evaluation    Patient: Jigar Larson    Procedure(s) Performed: Colonoscopy    Final Anesthesia Type: general      Patient location during evaluation: GI PACU  Patient participation: Yes- Able to Participate  Level of consciousness: awake and alert and oriented  Post-procedure vital signs: reviewed and stable  Pain management: adequate  Airway patency: patent    PONV status at discharge: No PONV  Anesthetic complications: no      Cardiovascular status: blood pressure returned to baseline and stable  Respiratory status: unassisted, spontaneous ventilation and room air  Hydration status: euvolemic  Follow-up not needed.  Comments: Refer to nursing note for pain/monika score upon discharge from recovery.           Vitals Value Taken Time   BP 95/50 09/19/23 0944   Temp 36.6 °C (97.9 °F) 09/19/23 0944   Pulse 88 09/19/23 0944   Resp 17 09/19/23 0944   SpO2 94 % 09/19/23 0944         No case tracking events are documented in the log.      Pain/Monika Score: No data recorded

## 2023-09-19 NOTE — H&P
Gastroenterology Pre-procedure H&P    Chief Complaint: Colon cancer screening    History of Present Illness    Jigar Larson is a 43 y.o. male that  has a past medical history of Anxiety disorder, unspecified, GERD (gastroesophageal reflux disease), Palpitations, Rheumatoid arthritis, and Sleep apnea.     Patient here for routine index screening     Patient denies wt loss, abdominal pain, diarrhea, melena/hematochezia, change in stool caliber, no anticoagulants, FMHx of GI related malignancies.      Past Medical History:   Diagnosis Date    Anxiety disorder, unspecified     GERD (gastroesophageal reflux disease)     Palpitations     Rheumatoid arthritis     Sleep apnea        Past Surgical History:   Procedure Laterality Date    LEFT HEART CATHETERIZATION N/A 06/06/2023    Procedure: Left heart cath;  Surgeon: Fabio Lacy MD;  Location: [facility] CATH LAB;  Service: Cardiology;  Laterality: N/A;    LIPOMA RESECTION         Family History   Problem Relation Age of Onset    Lung cancer Mother     Allergies Brother        Social History     Socioeconomic History    Marital status:    Tobacco Use    Smoking status: Never     Passive exposure: Never    Smokeless tobacco: Never   Substance and Sexual Activity    Alcohol use: Never    Drug use: Never    Sexual activity: Yes       Current Outpatient Medications   Medication Sig Dispense Refill    busPIRone (BUSPAR) 10 MG tablet Take 1 tablet (10 mg total) by mouth 2 (two) times daily. 60 tablet 3    etanercept (ENBREL) 50 mg/mL (1 mL) injection Inject 50 mg into the skin once a week.      hydrOXYzine pamoate (VISTARIL) 25 MG Cap Take 1 capsule (25 mg total) by mouth every 6 (six) hours as needed (Anxiety). 20 capsule 0    metoprolol succinate (TOPROL-XL) 50 MG 24 hr tablet Take 1 tablet (50 mg total) by mouth once daily. 90 tablet 1    mv-mn/iron/folic acid/herb 190 (VITAMIN D3 COMPLETE ORAL) Take by mouth once daily.      pantoprazole (PROTONIX) 40 MG tablet  Take 40 mg by mouth once daily.      vilazodone (VIIBRYD) 20 mg Tab Take 20 mg by mouth once daily.       No current facility-administered medications for this encounter.       Review of patient's allergies indicates:  No Known Allergies    Objective:  There were no vitals filed for this visit.     GEN: normal appearing, NAD, AAO x3  HENT: NCAT, anicteric, OP benign  CV: normal rate, regular rhythm  RESP: NABS, symmetric rise, unlabored  ABD: soft, ND, no guarding or TTP  SKIN: warm and dry  NEURO: grossly afocal    Assessment and Plan:    Proceed with:    Colonoscopy for screening for colon cancer    Trevor Agrawal MD  Gastroenterology

## 2023-09-20 ENCOUNTER — OFFICE VISIT (OUTPATIENT)
Dept: FAMILY MEDICINE | Facility: CLINIC | Age: 43
End: 2023-09-20
Payer: OTHER GOVERNMENT

## 2023-09-20 VITALS
HEART RATE: 88 BPM | WEIGHT: 298 LBS | DIASTOLIC BLOOD PRESSURE: 76 MMHG | HEIGHT: 72 IN | BODY MASS INDEX: 40.36 KG/M2 | SYSTOLIC BLOOD PRESSURE: 128 MMHG | TEMPERATURE: 98 F | RESPIRATION RATE: 18 BRPM | OXYGEN SATURATION: 95 %

## 2023-09-20 DIAGNOSIS — M54.2 NECK PAIN: Primary | ICD-10-CM

## 2023-09-20 DIAGNOSIS — R20.2 NUMBNESS AND TINGLING IN LEFT HAND: ICD-10-CM

## 2023-09-20 DIAGNOSIS — D17.1 LIPOMA OF BACK: ICD-10-CM

## 2023-09-20 DIAGNOSIS — R20.0 NUMBNESS AND TINGLING IN LEFT HAND: ICD-10-CM

## 2023-09-20 LAB
ESTROGEN SERPL-MCNC: NORMAL PG/ML
INSULIN SERPL-ACNC: NORMAL U[IU]/ML
LAB AP GROSS DESCRIPTION: NORMAL
LAB AP LABORATORY NOTES: NORMAL
T3RU NFR SERPL: NORMAL %

## 2023-09-20 PROCEDURE — 99213 PR OFFICE/OUTPT VISIT, EST, LEVL III, 20-29 MIN: ICD-10-PCS | Mod: ,,, | Performed by: FAMILY MEDICINE

## 2023-09-20 PROCEDURE — 99213 OFFICE O/P EST LOW 20 MIN: CPT | Mod: ,,, | Performed by: FAMILY MEDICINE

## 2023-09-20 RX ORDER — CLONAZEPAM 0.5 MG/1
0.5 TABLET ORAL NIGHTLY PRN
COMMUNITY

## 2023-09-20 RX ORDER — CETIRIZINE HYDROCHLORIDE 10 MG/1
10 TABLET ORAL NIGHTLY
COMMUNITY
Start: 2023-07-16

## 2023-09-20 RX ORDER — GABAPENTIN 100 MG/1
100 CAPSULE ORAL NIGHTLY
Qty: 30 CAPSULE | Refills: 2 | Status: SHIPPED | OUTPATIENT
Start: 2023-09-20 | End: 2023-09-29 | Stop reason: SDUPTHER

## 2023-09-20 NOTE — PROGRESS NOTES
Jigar Larson is a 43 y.o. male seen today for numbness affecting the fingers of his left hand.  He is also had episodes of neck discomfort as well as a burning sensation down his left arm.  Patient has a negative cardiac evaluation.      Past Medical History:   Diagnosis Date    Anxiety disorder, unspecified     GERD (gastroesophageal reflux disease)     Palpitations     Rheumatoid arthritis     Sleep apnea      Family History   Problem Relation Age of Onset    Lung cancer Mother     Allergies Brother      Current Outpatient Medications on File Prior to Visit   Medication Sig Dispense Refill    cetirizine (ZYRTEC) 10 MG tablet Take 10 mg by mouth every evening.      clonazePAM (KLONOPIN) 0.5 MG tablet Take 0.5 mg by mouth nightly as needed.      etanercept (ENBREL) 50 mg/mL (1 mL) injection Inject 50 mg into the skin once a week.      metoprolol succinate (TOPROL-XL) 50 MG 24 hr tablet Take 1 tablet (50 mg total) by mouth once daily. 90 tablet 1    mv-mn/iron/folic acid/herb 190 (VITAMIN D3 COMPLETE ORAL) Take by mouth once daily.      pantoprazole (PROTONIX) 40 MG tablet Take 40 mg by mouth once daily.      vilazodone (VIIBRYD) 20 mg Tab Take 20 mg by mouth once daily.      [DISCONTINUED] busPIRone (BUSPAR) 10 MG tablet Take 1 tablet (10 mg total) by mouth 2 (two) times daily. 60 tablet 3    [DISCONTINUED] hydrOXYzine pamoate (VISTARIL) 25 MG Cap Take 1 capsule (25 mg total) by mouth every 6 (six) hours as needed (Anxiety). 20 capsule 0     Current Facility-Administered Medications on File Prior to Visit   Medication Dose Route Frequency Provider Last Rate Last Admin    [DISCONTINUED] sodium chloride 0.9% flush 10 mL  10 mL Intravenous PRN Trevor Agrawal MD         Immunization History   Administered Date(s) Administered    COVID-19, MRNA, LN-S, PF (Pfizer) (Purple Cap) 04/09/2021, 04/30/2021    Influenza - Quadrivalent - PF *Preferred* (6 months and older) 10/14/2022       Review of Systems   Constitutional:   Negative for fever, malaise/fatigue and weight loss.   Respiratory:  Negative for shortness of breath.    Cardiovascular:  Positive for chest pain. Negative for palpitations.   Gastrointestinal:  Negative for nausea and vomiting.   Musculoskeletal:  Positive for myalgias and neck pain.   Neurological:  Positive for tingling.   Psychiatric/Behavioral:  Negative for depression.         Vitals:    09/20/23 1312   BP: 128/76   Pulse: 88   Resp: 18   Temp: 98.3 °F (36.8 °C)       Physical Exam  Skin:            Comments: Patient has a soft tissue mass approximally 4-5 cm in the above location          Assessment and Plan  Neck pain  -     X-Ray Cervical Spine 2 or 3 Views; Future; Expected date: 09/20/2023    Numbness and tingling in left hand  -     X-Ray Cervical Spine 2 or 3 Views; Future; Expected date: 09/20/2023  -     Ambulatory referral/consult to Neurology; Future; Expected date: 09/27/2023  -     gabapentin (NEURONTIN) 100 MG capsule; Take 1 capsule (100 mg total) by mouth every evening.  Dispense: 30 capsule; Refill: 2    Lipoma of back  -     Ambulatory referral/consult to General Surgery; Future; Expected date: 09/27/2023            Return to clinic in 6 weeks or as needed.    Health Maintenance Topics with due status: Not Due       Topic Last Completion Date    Hemoglobin A1c (Diabetic Prevention Screening) 02/01/2023    Lipid Panel 06/08/2023    Colorectal Cancer Screening 09/19/2023

## 2023-09-20 NOTE — PROGRESS NOTES
Dr. Carmichael, thank you for referring this patient to me. I recommend repeat colonoscopy in 7 years. Please let me know if you have any questions regarding this patient.

## 2023-09-22 ENCOUNTER — TELEPHONE (OUTPATIENT)
Dept: FAMILY MEDICINE | Facility: CLINIC | Age: 43
End: 2023-09-22
Payer: OTHER GOVERNMENT

## 2023-09-22 DIAGNOSIS — M54.2 NECK PAIN: Primary | ICD-10-CM

## 2023-09-22 NOTE — TELEPHONE ENCOUNTER
----- Message from Rasta Carmichael MD sent at 9/20/2023  4:56 PM CDT -----  Patient does have degenerative disc changes at C5 through C6.  Due to his symptoms and MRI may be necessary.  If the patient is willing please set up a noncontrast MRI of the C-spine.

## 2023-09-25 ENCOUNTER — OFFICE VISIT (OUTPATIENT)
Dept: SURGERY | Facility: CLINIC | Age: 43
End: 2023-09-25
Payer: OTHER GOVERNMENT

## 2023-09-25 DIAGNOSIS — D17.1 LIPOMA OF BACK: Primary | ICD-10-CM

## 2023-09-25 PROCEDURE — 99204 OFFICE O/P NEW MOD 45 MIN: CPT | Mod: S$PBB,,, | Performed by: STUDENT IN AN ORGANIZED HEALTH CARE EDUCATION/TRAINING PROGRAM

## 2023-09-25 PROCEDURE — 99204 PR OFFICE/OUTPT VISIT, NEW, LEVL IV, 45-59 MIN: ICD-10-PCS | Mod: S$PBB,,, | Performed by: STUDENT IN AN ORGANIZED HEALTH CARE EDUCATION/TRAINING PROGRAM

## 2023-09-25 PROCEDURE — 99214 OFFICE O/P EST MOD 30 MIN: CPT | Mod: PBBFAC | Performed by: STUDENT IN AN ORGANIZED HEALTH CARE EDUCATION/TRAINING PROGRAM

## 2023-09-25 RX ORDER — SODIUM CHLORIDE 9 MG/ML
INJECTION, SOLUTION INTRAVENOUS CONTINUOUS
Status: CANCELLED | OUTPATIENT
Start: 2023-09-25

## 2023-09-25 NOTE — PATIENT INSTRUCTIONS
[facility]      Your surgery is scheduled for 10/16/2023 at [facility] on the ground floor of the Ambulatory building. You should arrive at 0600 at the Ambulatory Care Center located at [address].                                                                                                                                                                                                                                                                                                                                                           Preoperative Instructions        Your pre-op lab work will be on 10/13/2023 on the 1st floor of the [facility] building.                                                                                                                                               Day of Surgery Instructions      Bring a list of all your medications with you the day of your surgery. You can also give the list to your doctor or nurse during your final clinic appointment before surgery.      Do not eat any solid foods or drink any liquids after 12:00 AM (midnight). This includes gum, hard candy, mints, and chewing tobacco.  Medications: Take any medications specified with a small sip of water the morning of your surgery.  Brush your teeth: You may brush your teeth and rinse your mouth. Do not swallow any water or toothpaste.  Clothing: A button front shirt and loose-fitting clothes are the most comfortable before and after surgery. We also recommend low-heeled shoes.  Hair: Avoid buns, ponytails, or hairpieces at the back of the head. Remove or avoid any clips, pins or bands that bind hair. Do not use hairspray. Before going to surgery, you will need to remove any wigs or hairpieces.  We will cover your hair during surgery. Your privacy regarding personal appearance will be respected.  Fingernails: Please be sure to remove all nail polish before you arrive  for surgery. We understand that tips, wraps, gels, etc., are expensive; however, we ask these products to be removed from at least one finger on each hand. Your fingertips are used to accurately monitor your oxygen level during surgery by a device called an oximeter.  Glasses and Contact Lenses: Wear glasses when possible. If contact lenses must be worn, bring a lens case and solution. If glasses are worn, bring a case for them.  Hearing Aids: If you rely on a hearing aid, wear it to the hospital on the day of surgery. This will ensure you can hear and understand everything we need to communicate with you.  Valuables: Jewelry, including body piercings, Dentures, money, and credit cards should be left at home. [facility] is not responsible for valuables that are not secured in our surgery center.  Makeup, Perfume, Creams, Lotions and Deodorants: Do not use any of these products on the day of surgery. Remove false eyelashes prior to surgery.  Implanted Medical Devices: If you have an implanted device, such as a pacemaker or AICD, bring the device information card (if you have it) with you.  Medical Equipment: If you have been fitted for a brace to wear after surgery or you have been given crutches, bring those with you to the surgery center.  Shower: Take a shower with Hibiclens® (chlorhexidine) (available over the counter). This reduces the chance of infection. PLEASE USE CHLORHEXIDINE WASH THE NIGHT BEFORE SURGERY AND THE MORNING OF SURGERY.      Medication instructions:  You may take blood pressure medication with a small drink of water the morning of surgery.    Stop taking blood thinners 2 days before surgery.    IF YOU ARE ON ANY OF THESE BLOOD THINNERS, MAKE SURE YOUR PHYSICIAN IS AWARE.  Eliquis/Apixaban            Wafarin/Coumadin,Jantoven  Xarelto/Rivaroxaban      Pletal/Cilostazol  Plavix/Clopidogrel          Pradaxa/Dibigatran      If you are diabetic      Follow the diabetic medicine instructions you  received during your pre-operative visit.  DO NOT take your insulin or diabetic medications the morning of surgery.  When you arrive at the surgical center, be sure to tell the nurse you are diabetic.    The following blood sugar medications have to be stopped prior to surgery:    Hold 24 hours prior to surgery:    Libraglutide - Saxenda   Adlyxen - Lixixerutose  Byetta - Exenatide  Saxenda - Liralutide   Victoza    Hold 1 week prior to surgery:    Semaglutide - Ozempic, Wegouy, Rybelsus  Dulaglutide - Trulicity  Tiazepatide - Mounjaro  Bydurcon    Hold 48 hours prior to surgery:    Metformin, Glucovance, Metaglip, Fortamet, Glucophage, Riomet, Avandamet, Glimepiride            Other Items to bring with you and know      Insurance card  Identification card such as 's license, passport, or other picture ID  Copy of your advance directives  List of medications and allergies, if not already provided  Name and phone number of person to contact if your condition changes significantly. YOU CANNOT DRIVE YOURSELF HOME FROM THE HOSPITAL THE DAY OF SURGERY.  PLEASE UNDERSTAND THAT OUR OFFICE DOES NOT GIVE PATHOLOGY RESULTS OR TEST RESULTS OVER THE PHONE. THIS WILL BE DISCUSSED WITH YOU ON YOUR FOLLOW UP APPOINTMENT.          Alcohol and Surgery  We want to help you prepare for and recover from surgery as quickly and safely as possible. Be open and honest with your provider about how many drinks you have per day. Excessive alcohol use is defined as drinking more than three drinks per day. It can affect the outcome of your surgery. Binge drinking (consuming large amounts of alcohol infrequently, such as on weekends) can also affect the outcome of your surgery.  Alcohol withdrawal  If you drink more than three drinks a day, you could have a complication, called alcohol withdrawal, after surgery.  Alcohol withdrawal is a set of symptoms that people have when they suddenly stop drinking after using alcohol  for a long time.  During withdrawal, a person's central nervous system overreacts. This can cause mild symptoms such as shakiness, sweating or hallucinating. It can also cause other more serious side effects. If not treated properly, alcohol withdrawal can cause potentially life-threatening complications after surgery. This can include tremors, seizures, hallucinations, delirium tremors, and even death. Untreated alcohol withdrawal often leads to a longer stay in the hospital, potentially in the Intensive Care Unit.  Chronic heavy drinking also can interfere with several organ systems and biochemical processes in the body.  This interference can cause serious, even life-threatening complications.  Your care team can offer alcohol withdrawal treatment to help:  Decrease the risk of seizures and delirium tremors after surgery  Decrease the risk we will need to restrain you for your own safety or the safety of others  Decrease your risk of falling after surgery  Reduce the use of potent sedative medications  Reduce the time you stay in the hospital after surgery  Reduce the time you might spend on a mechanical ventilator to help you breathe  Lower incidence of organ failure and biochemical complications  Talk to a member of your care team or your primary care physician about your alcohol use if you feel you may be at risk of any of these complications.        Smoking and Surgery  Quitting smoking is extremely important for a successful surgery and recovery. Cigarette smoking compromises your immune system. This increases your risk of an infection after surgery. Quitting the habit before surgery will decrease the surgical risks associated with smoking.

## 2023-09-25 NOTE — H&P (VIEW-ONLY)
History & Physical    SUBJECTIVE:     History of Present Illness:  43-year-old  male presents the clinic for evaluation for fatty tumor to the back.  Has been having some numbness and tingling to the left hand as well as discomfort to the neck and was seen by his primary care physician 5 days ago; patient is sent to my clinic for evaluation for the fatty tumor; sent to neurology to evaluate the numbness and taking, which is scheduled for September 27th.  Denies any chest pain/shortness of breath, nausea/vomiting/diarrhea, fever/chills.  History of atrial fibrillation; on metoprolol; also has some occasional palpitations and nonsustained ventricular tachycardia.    Chief Complaint   Patient presents with    Pre-op Exam     Lipoma on (L) shoulder        Review of patient's allergies indicates:  No Known Allergies    Current Outpatient Medications   Medication Sig Dispense Refill    cetirizine (ZYRTEC) 10 MG tablet Take 10 mg by mouth every evening.      clonazePAM (KLONOPIN) 0.5 MG tablet Take 0.5 mg by mouth nightly as needed.      etanercept (ENBREL) 50 mg/mL (1 mL) injection Inject 50 mg into the skin once a week.      gabapentin (NEURONTIN) 100 MG capsule Take 1 capsule (100 mg total) by mouth every evening. 30 capsule 2    metoprolol succinate (TOPROL-XL) 50 MG 24 hr tablet Take 1 tablet (50 mg total) by mouth once daily. 90 tablet 1    mv-mn/iron/folic acid/herb 190 (VITAMIN D3 COMPLETE ORAL) Take by mouth once daily.      pantoprazole (PROTONIX) 40 MG tablet Take 40 mg by mouth once daily.      vilazodone (VIIBRYD) 20 mg Tab Take 20 mg by mouth once daily.       No current facility-administered medications for this visit.       Past Medical History:   Diagnosis Date    Anxiety disorder, unspecified     GERD (gastroesophageal reflux disease)     Palpitations     Rheumatoid arthritis     Sleep apnea      Past Surgical History:   Procedure Laterality Date    LEFT HEART CATHETERIZATION N/A 06/06/2023     Procedure: Left heart cath;  Surgeon: Fabio Lacy MD;  Location: [facility] CATH LAB;  Service: Cardiology;  Laterality: N/A;    LIPOMA RESECTION       Family History   Problem Relation Age of Onset    Lung cancer Mother     Allergies Brother      Social History     Tobacco Use    Smoking status: Never     Passive exposure: Never    Smokeless tobacco: Never   Substance Use Topics    Alcohol use: Never    Drug use: Never        Review of Systems:  Review of Systems   Constitutional: Negative.  Negative for appetite change, chills, fever and unexpected weight change.   HENT: Negative.  Negative for trouble swallowing.    Eyes: Negative.    Respiratory: Negative.  Negative for chest tightness and shortness of breath.    Cardiovascular: Negative.  Negative for chest pain.   Gastrointestinal: Negative.  Negative for abdominal distention, abdominal pain, blood in stool and nausea.   Endocrine: Negative.    Genitourinary: Negative.  Negative for hematuria.   Musculoskeletal: Negative.  Negative for back pain and myalgias.   Skin: Negative.  Negative for color change.   Allergic/Immunologic: Negative.    Neurological: Negative.  Negative for dizziness, syncope, weakness and light-headedness.   Psychiatric/Behavioral: Negative.  Negative for agitation.        OBJECTIVE:     Vital Signs (Most Recent)              Physical Exam:  Physical Exam  Constitutional:       General: He is not in acute distress.     Appearance: Normal appearance.   HENT:      Head: Normocephalic.   Cardiovascular:      Rate and Rhythm: Normal rate.   Pulmonary:      Effort: Pulmonary effort is normal. No respiratory distress.   Abdominal:      General: There is no distension.      Tenderness: There is no abdominal tenderness.   Musculoskeletal:         General: Normal range of motion.        Back:       Comments: 4 cm fatty tumor to the left posterior shoulder/back; nontender to palpation   Skin:     General: Skin is warm.      Coloration: Skin  is not jaundiced.   Neurological:      General: No focal deficit present.      Mental Status: He is alert and oriented to person, place, and time.      Cranial Nerves: No cranial nerve deficit.         ASSESSMENT/PLAN:     Left back fatty tumor    PLAN:Plan     We will schedule for excision of a left back fatty tumor.      Risks and benefits.      Patient will need a CBC, BMP and EKG prior to procedure.

## 2023-09-25 NOTE — PROGRESS NOTES
History & Physical    SUBJECTIVE:     History of Present Illness:  43-year-old  male presents the clinic for evaluation for fatty tumor to the back.  Has been having some numbness and tingling to the left hand as well as discomfort to the neck and was seen by his primary care physician 5 days ago; patient is sent to my clinic for evaluation for the fatty tumor; sent to neurology to evaluate the numbness and taking, which is scheduled for September 27th.  Denies any chest pain/shortness of breath, nausea/vomiting/diarrhea, fever/chills.  History of atrial fibrillation; on metoprolol; also has some occasional palpitations and nonsustained ventricular tachycardia.    Chief Complaint   Patient presents with    Pre-op Exam     Lipoma on (L) shoulder        Review of patient's allergies indicates:  No Known Allergies    Current Outpatient Medications   Medication Sig Dispense Refill    cetirizine (ZYRTEC) 10 MG tablet Take 10 mg by mouth every evening.      clonazePAM (KLONOPIN) 0.5 MG tablet Take 0.5 mg by mouth nightly as needed.      etanercept (ENBREL) 50 mg/mL (1 mL) injection Inject 50 mg into the skin once a week.      gabapentin (NEURONTIN) 100 MG capsule Take 1 capsule (100 mg total) by mouth every evening. 30 capsule 2    metoprolol succinate (TOPROL-XL) 50 MG 24 hr tablet Take 1 tablet (50 mg total) by mouth once daily. 90 tablet 1    mv-mn/iron/folic acid/herb 190 (VITAMIN D3 COMPLETE ORAL) Take by mouth once daily.      pantoprazole (PROTONIX) 40 MG tablet Take 40 mg by mouth once daily.      vilazodone (VIIBRYD) 20 mg Tab Take 20 mg by mouth once daily.       No current facility-administered medications for this visit.       Past Medical History:   Diagnosis Date    Anxiety disorder, unspecified     GERD (gastroesophageal reflux disease)     Palpitations     Rheumatoid arthritis     Sleep apnea      Past Surgical History:   Procedure Laterality Date    LEFT HEART CATHETERIZATION N/A 06/06/2023     Procedure: Left heart cath;  Surgeon: Fabio Lacy MD;  Location: [facility] CATH LAB;  Service: Cardiology;  Laterality: N/A;    LIPOMA RESECTION       Family History   Problem Relation Age of Onset    Lung cancer Mother     Allergies Brother      Social History     Tobacco Use    Smoking status: Never     Passive exposure: Never    Smokeless tobacco: Never   Substance Use Topics    Alcohol use: Never    Drug use: Never        Review of Systems:  Review of Systems   Constitutional: Negative.  Negative for appetite change, chills, fever and unexpected weight change.   HENT: Negative.  Negative for trouble swallowing.    Eyes: Negative.    Respiratory: Negative.  Negative for chest tightness and shortness of breath.    Cardiovascular: Negative.  Negative for chest pain.   Gastrointestinal: Negative.  Negative for abdominal distention, abdominal pain, blood in stool and nausea.   Endocrine: Negative.    Genitourinary: Negative.  Negative for hematuria.   Musculoskeletal: Negative.  Negative for back pain and myalgias.   Skin: Negative.  Negative for color change.   Allergic/Immunologic: Negative.    Neurological: Negative.  Negative for dizziness, syncope, weakness and light-headedness.   Psychiatric/Behavioral: Negative.  Negative for agitation.        OBJECTIVE:     Vital Signs (Most Recent)              Physical Exam:  Physical Exam  Constitutional:       General: He is not in acute distress.     Appearance: Normal appearance.   HENT:      Head: Normocephalic.   Cardiovascular:      Rate and Rhythm: Normal rate.   Pulmonary:      Effort: Pulmonary effort is normal. No respiratory distress.   Abdominal:      General: There is no distension.      Tenderness: There is no abdominal tenderness.   Musculoskeletal:         General: Normal range of motion.        Back:       Comments: 4 cm fatty tumor to the left posterior shoulder/back; nontender to palpation   Skin:     General: Skin is warm.      Coloration: Skin  is not jaundiced.   Neurological:      General: No focal deficit present.      Mental Status: He is alert and oriented to person, place, and time.      Cranial Nerves: No cranial nerve deficit.         ASSESSMENT/PLAN:     Left back fatty tumor    PLAN:Plan     We will schedule for excision of a left back fatty tumor.      Risks and benefits.      Patient will need a CBC, BMP and EKG prior to procedure.

## 2023-09-26 ENCOUNTER — PATIENT MESSAGE (OUTPATIENT)
Dept: SURGERY | Facility: CLINIC | Age: 43
End: 2023-09-26
Payer: OTHER GOVERNMENT

## 2023-09-27 ENCOUNTER — PATIENT MESSAGE (OUTPATIENT)
Dept: SURGERY | Facility: CLINIC | Age: 43
End: 2023-09-27
Payer: OTHER GOVERNMENT

## 2023-09-29 ENCOUNTER — OFFICE VISIT (OUTPATIENT)
Dept: NEUROLOGY | Facility: CLINIC | Age: 43
End: 2023-09-29
Payer: OTHER GOVERNMENT

## 2023-09-29 VITALS
DIASTOLIC BLOOD PRESSURE: 74 MMHG | HEIGHT: 72 IN | OXYGEN SATURATION: 97 % | BODY MASS INDEX: 40.36 KG/M2 | SYSTOLIC BLOOD PRESSURE: 124 MMHG | WEIGHT: 298 LBS | HEART RATE: 70 BPM

## 2023-09-29 DIAGNOSIS — R20.2 NUMBNESS AND TINGLING IN LEFT HAND: ICD-10-CM

## 2023-09-29 DIAGNOSIS — R20.0 NUMBNESS AND TINGLING IN LEFT HAND: Primary | ICD-10-CM

## 2023-09-29 DIAGNOSIS — R20.0 NUMBNESS AND TINGLING IN LEFT HAND: ICD-10-CM

## 2023-09-29 DIAGNOSIS — R20.2 NUMBNESS AND TINGLING IN LEFT HAND: Primary | ICD-10-CM

## 2023-09-29 DIAGNOSIS — M54.12 CERVICAL RADICULOPATHY: ICD-10-CM

## 2023-09-29 PROCEDURE — 99204 OFFICE O/P NEW MOD 45 MIN: CPT | Mod: S$PBB,,, | Performed by: PSYCHIATRY & NEUROLOGY

## 2023-09-29 PROCEDURE — 99214 OFFICE O/P EST MOD 30 MIN: CPT | Mod: PBBFAC | Performed by: PSYCHIATRY & NEUROLOGY

## 2023-09-29 PROCEDURE — 99204 PR OFFICE/OUTPT VISIT, NEW, LEVL IV, 45-59 MIN: ICD-10-PCS | Mod: S$PBB,,, | Performed by: PSYCHIATRY & NEUROLOGY

## 2023-09-29 RX ORDER — GABAPENTIN 300 MG/1
300 CAPSULE ORAL 2 TIMES DAILY
Qty: 180 CAPSULE | Refills: 1 | Status: SHIPPED | OUTPATIENT
Start: 2023-09-29

## 2023-09-29 NOTE — PATIENT INSTRUCTIONS
Nerve conduction velocity/EMG bilateral upper extremities rule out neuropathy   MRI cervical pending soon   Increase gabapentin 300 mg twice daily   Avoid flexion extension at the risk   Wear wrist splints over left wrist   Follow-up three-months

## 2023-09-29 NOTE — PROGRESS NOTES
Subjective:       Patient ID: Jigar Larson is a 43 y.o. male     Chief Complaint:    Chief Complaint   Patient presents with    weakness and tingling left arm        Allergies:  Patient has no known allergies.    Current Medications:    Outpatient Encounter Medications as of 9/29/2023   Medication Sig Dispense Refill    cetirizine (ZYRTEC) 10 MG tablet Take 10 mg by mouth every evening.      clonazePAM (KLONOPIN) 0.5 MG tablet Take 0.5 mg by mouth nightly as needed.      etanercept (ENBREL) 50 mg/mL (1 mL) injection Inject 50 mg into the skin once a week.      gabapentin (NEURONTIN) 300 MG capsule Take 1 capsule (300 mg total) by mouth 2 (two) times daily. 180 capsule 1    metoprolol succinate (TOPROL-XL) 50 MG 24 hr tablet Take 1 tablet (50 mg total) by mouth once daily. 90 tablet 1    mv-mn/iron/folic acid/herb 190 (VITAMIN D3 COMPLETE ORAL) Take by mouth once daily.      pantoprazole (PROTONIX) 40 MG tablet Take 40 mg by mouth once daily.      vilazodone (VIIBRYD) 20 mg Tab Take 20 mg by mouth once daily.      [DISCONTINUED] gabapentin (NEURONTIN) 100 MG capsule Take 1 capsule (100 mg total) by mouth every evening. 30 capsule 2     No facility-administered encounter medications on file as of 9/29/2023.       History of Present Illness  43-year-old white male referred to clinic for evaluation of numbness and tingling sensation in his left hand-will rule out neuropathy with nerve conduction velocity testing. He has had cervical neck pain for over a year and radicular symptoms into LUE.  Patient is started on 100 mg of gabapentin daily approximately 1 week ago with minimal relief   Has pending MRI cervical soon?  His paresthesias are in no particular nerve distribution            Past Medical History:   Diagnosis Date    Anxiety disorder, unspecified     GERD (gastroesophageal reflux disease)     Palpitations     Rheumatoid arthritis     Sleep apnea        Past Surgical History:   Procedure Laterality Date    LEFT  HEART CATHETERIZATION N/A 06/06/2023    Procedure: Left heart cath;  Surgeon: Fabio Lacy MD;  Location: [facility] CATH LAB;  Service: Cardiology;  Laterality: N/A;    LIPOMA RESECTION         Social History  Mr. Larson  reports that he has never smoked. He has never been exposed to tobacco smoke. He has never used smokeless tobacco. He reports that he does not drink alcohol and does not use drugs.    Family History  Mr.'s Larson family history includes Allergies in his brother; Lung cancer in his mother.    Review of Systems  Review of Systems   Neurological:  Positive for tingling and sensory change.   All other systems reviewed and are negative.     Objective:   /74 (BP Location: Right arm, Patient Position: Sitting, BP Method: Large (Manual))   Pulse 70   Ht 6' (1.829 m)   Wt 135.2 kg (298 lb)   SpO2 97%   BMI 40.42 kg/m²    NEUROLOGICAL EXAMINATION:     MENTAL STATUS   Oriented to person, place, and time.   Level of consciousness: alert  Knowledge: good.     CRANIAL NERVES   Cranial nerves II through XII intact.     MOTOR EXAM     Strength   Strength 5/5 throughout.     REFLEXES     Reflexes   Right brachioradialis: 2+  Left brachioradialis: 2+  Right biceps: 2+  Left biceps: 2+  Right triceps: 2+  Left triceps: 2+  Right patellar: 2+  Left patellar: 2+  Right achilles: 2+  Left achilles: 2+  Right plantar: normal  Left plantar: normal    SENSORY EXAM        LUE paresthesias      GAIT AND COORDINATION     Gait  Gait: normal       Physical Exam  Vitals reviewed.   Constitutional:       Appearance: He is obese.   Neurological:      Mental Status: He is alert and oriented to person, place, and time. Mental status is at baseline.      Cranial Nerves: Cranial nerves 2-12 are intact.      Motor: Motor strength is normal.     Gait: Gait is intact.      Deep Tendon Reflexes:      Reflex Scores:       Tricep reflexes are 2+ on the right side and 2+ on the left side.       Bicep reflexes are 2+ on the  right side and 2+ on the left side.       Brachioradialis reflexes are 2+ on the right side and 2+ on the left side.       Patellar reflexes are 2+ on the right side and 2+ on the left side.       Achilles reflexes are 2+ on the right side and 2+ on the left side.         Assessment:     Numbness and tingling in left hand  Comments:  Numbness and tingling in left hand mostly in the median nerve distribution consistent with possible carpal tunnel syndrome  Orders:  -     Ambulatory referral/consult to Neurology  -     EMG W/ ULTRASOUND AND NERVE CONDUCTION TEST 2 Extremities; Future  -     gabapentin (NEURONTIN) 300 MG capsule; Take 1 capsule (300 mg total) by mouth 2 (two) times daily.  Dispense: 180 capsule; Refill: 1    Numbness and tingling in left hand  -     Ambulatory referral/consult to Neurology  -     EMG W/ ULTRASOUND AND NERVE CONDUCTION TEST 2 Extremities; Future  -     gabapentin (NEURONTIN) 300 MG capsule; Take 1 capsule (300 mg total) by mouth 2 (two) times daily.  Dispense: 180 capsule; Refill: 1    Cervical radiculopathy         Primary Diagnosis and ICD10  Numbness and tingling in left hand [R20.0, R20.2]    Plan:     Patient Instructions   Nerve conduction velocity/EMG bilateral upper extremities rule out neuropathy   MRI cervical pending soon   Increase gabapentin 300 mg twice daily   Avoid flexion extension at the risk   Wear wrist splints over left wrist   Follow-up three-months    Medications Discontinued During This Encounter   Medication Reason    gabapentin (NEURONTIN) 100 MG capsule Reorder       Requested Prescriptions     Signed Prescriptions Disp Refills    gabapentin (NEURONTIN) 300 MG capsule 180 capsule 1     Sig: Take 1 capsule (300 mg total) by mouth 2 (two) times daily.

## 2023-10-02 ENCOUNTER — OFFICE VISIT (OUTPATIENT)
Dept: FAMILY MEDICINE | Facility: CLINIC | Age: 43
End: 2023-10-02
Payer: OTHER GOVERNMENT

## 2023-10-02 VITALS
TEMPERATURE: 98 F | HEART RATE: 76 BPM | BODY MASS INDEX: 41.31 KG/M2 | RESPIRATION RATE: 19 BRPM | OXYGEN SATURATION: 93 % | WEIGHT: 305 LBS | DIASTOLIC BLOOD PRESSURE: 82 MMHG | SYSTOLIC BLOOD PRESSURE: 132 MMHG | HEIGHT: 72 IN

## 2023-10-02 DIAGNOSIS — R73.09 ELEVATED GLUCOSE LEVEL: ICD-10-CM

## 2023-10-02 DIAGNOSIS — Z13.220 SCREENING FOR LIPOID DISORDERS: ICD-10-CM

## 2023-10-02 DIAGNOSIS — Z00.00 ROUTINE GENERAL MEDICAL EXAMINATION AT A HEALTH CARE FACILITY: Primary | ICD-10-CM

## 2023-10-02 DIAGNOSIS — Z13.1 SCREENING FOR DIABETES MELLITUS (DM): ICD-10-CM

## 2023-10-02 LAB
CHOLEST SERPL-MCNC: 173 MG/DL (ref 0–200)
CHOLEST/HDLC SERPL: 3.5 {RATIO}
GLUCOSE SERPL-MCNC: 116 MG/DL (ref 74–106)
HDLC SERPL-MCNC: 50 MG/DL (ref 40–60)
LDLC SERPL CALC-MCNC: 103 MG/DL
LDLC/HDLC SERPL: 2.1 {RATIO}
NONHDLC SERPL-MCNC: 123 MG/DL
TRIGL SERPL-MCNC: 98 MG/DL (ref 35–150)
VLDLC SERPL-MCNC: 20 MG/DL

## 2023-10-02 PROCEDURE — 82947 ASSAY GLUCOSE BLOOD QUANT: CPT | Mod: ,,, | Performed by: CLINICAL MEDICAL LABORATORY

## 2023-10-02 PROCEDURE — 99396 PR PREVENTIVE VISIT,EST,40-64: ICD-10-PCS | Mod: ,,, | Performed by: FAMILY MEDICINE

## 2023-10-02 PROCEDURE — 99396 PREV VISIT EST AGE 40-64: CPT | Mod: ,,, | Performed by: FAMILY MEDICINE

## 2023-10-02 PROCEDURE — 80061 LIPID PANEL: CPT | Mod: ,,, | Performed by: CLINICAL MEDICAL LABORATORY

## 2023-10-02 PROCEDURE — 82947 GLUCOSE, FASTING: ICD-10-PCS | Mod: ,,, | Performed by: CLINICAL MEDICAL LABORATORY

## 2023-10-02 PROCEDURE — 80061 LIPID PANEL: ICD-10-PCS | Mod: ,,, | Performed by: CLINICAL MEDICAL LABORATORY

## 2023-10-02 NOTE — PROGRESS NOTES
Jigar Larson is a 43 y.o. male seen today for  his annual wellness visit.  Patient is up-to-date on his colon cancer screening and is planning to have his flu vaccination done in November.  He denies any chest pain or shortness a breath and is working and meeting his ADLs with no problems.    Past Medical History:   Diagnosis Date    Anxiety disorder, unspecified     GERD (gastroesophageal reflux disease)     Palpitations     Rheumatoid arthritis     Sleep apnea      Family History   Problem Relation Age of Onset    Lung cancer Mother     Allergies Brother      Current Outpatient Medications on File Prior to Visit   Medication Sig Dispense Refill    cetirizine (ZYRTEC) 10 MG tablet Take 10 mg by mouth every evening.      clonazePAM (KLONOPIN) 0.5 MG tablet Take 0.5 mg by mouth nightly as needed.      etanercept (ENBREL) 50 mg/mL (1 mL) injection Inject 50 mg into the skin once a week.      gabapentin (NEURONTIN) 300 MG capsule Take 1 capsule (300 mg total) by mouth 2 (two) times daily. 180 capsule 1    metoprolol succinate (TOPROL-XL) 50 MG 24 hr tablet Take 1 tablet (50 mg total) by mouth once daily. 90 tablet 1    mv-mn/iron/folic acid/herb 190 (VITAMIN D3 COMPLETE ORAL) Take by mouth once daily.      pantoprazole (PROTONIX) 40 MG tablet Take 40 mg by mouth once daily.      vilazodone (VIIBRYD) 20 mg Tab Take 20 mg by mouth once daily.       No current facility-administered medications on file prior to visit.     Immunization History   Administered Date(s) Administered    COVID-19, MRNA, LN-S, PF (Pfizer) (Purple Cap) 04/09/2021, 04/30/2021    Influenza - Quadrivalent - PF *Preferred* (6 months and older) 10/14/2022       Review of Systems   Constitutional:  Negative for fever, malaise/fatigue and weight loss.   Respiratory:  Negative for shortness of breath.    Cardiovascular:  Negative for chest pain and palpitations.   Gastrointestinal:  Negative for nausea and vomiting.   Psychiatric/Behavioral:  Negative for  depression.         Vitals:    10/02/23 0842   BP: 132/82   Pulse: 76   Resp: 19   Temp: 98.4 °F (36.9 °C)       Physical Exam  Vitals reviewed.   Constitutional:       Appearance: Normal appearance.   HENT:      Head: Normocephalic.   Eyes:      Extraocular Movements: Extraocular movements intact.      Conjunctiva/sclera: Conjunctivae normal.      Pupils: Pupils are equal, round, and reactive to light.   Neck:      Thyroid: No thyroid mass or thyromegaly.   Cardiovascular:      Rate and Rhythm: Normal rate and regular rhythm.      Heart sounds: Normal heart sounds. No murmur heard.     No gallop.   Pulmonary:      Effort: Pulmonary effort is normal. No respiratory distress.      Breath sounds: Normal breath sounds. No wheezing or rales.   Skin:     General: Skin is warm and dry.      Coloration: Skin is not jaundiced or pale.   Neurological:      Mental Status: He is alert.   Psychiatric:         Mood and Affect: Mood normal.         Behavior: Behavior normal.         Thought Content: Thought content normal.         Judgment: Judgment normal.          Assessment and Plan  1. Routine general medical examination at a health care facility    2. Screening for lipoid disorders  -     Lipid Panel; Future; Expected date: 10/02/2023    3. Screening for diabetes mellitus (DM)  -     Glucose, Fasting; Future; Expected date: 10/02/2023             Return to clinic in 6 months.     Health Maintenance Topics with due status: Not Due       Topic Last Completion Date    Hemoglobin A1c (Diabetic Prevention Screening) 02/01/2023    Lipid Panel 06/08/2023    Colorectal Cancer Screening 09/19/2023

## 2023-10-03 ENCOUNTER — TELEPHONE (OUTPATIENT)
Dept: NEUROLOGY | Facility: CLINIC | Age: 43
End: 2023-10-03
Payer: OTHER GOVERNMENT

## 2023-10-03 ENCOUNTER — TELEPHONE (OUTPATIENT)
Dept: FAMILY MEDICINE | Facility: CLINIC | Age: 43
End: 2023-10-03
Payer: OTHER GOVERNMENT

## 2023-10-03 DIAGNOSIS — R73.9 ELEVATED BLOOD SUGAR: Primary | ICD-10-CM

## 2023-10-03 DIAGNOSIS — R73.09 ELEVATED GLUCOSE LEVEL: Primary | ICD-10-CM

## 2023-10-03 LAB
EST. AVERAGE GLUCOSE BLD GHB EST-MCNC: 110 MG/DL
HBA1C MFR BLD HPLC: 5.9 % (ref 4.5–6.6)

## 2023-10-03 PROCEDURE — 83036 HEMOGLOBIN GLYCOSYLATED A1C: CPT | Mod: ,,, | Performed by: CLINICAL MEDICAL LABORATORY

## 2023-10-03 PROCEDURE — 83036 HEMOGLOBIN A1C: ICD-10-PCS | Mod: ,,, | Performed by: CLINICAL MEDICAL LABORATORY

## 2023-10-03 NOTE — TELEPHONE ENCOUNTER
----- Message from Rasta Carmichael MD sent at 10/3/2023  7:52 AM CDT -----  Lipids are okay but please add a A1c for elevated blood sugar

## 2023-10-06 ENCOUNTER — TELEPHONE (OUTPATIENT)
Dept: FAMILY MEDICINE | Facility: CLINIC | Age: 43
End: 2023-10-06
Payer: OTHER GOVERNMENT

## 2023-10-06 NOTE — TELEPHONE ENCOUNTER
----- Message from Rasta Carmichael MD sent at 10/4/2023  7:56 AM CDT -----  Patient is borderline diabetic.  We will discuss at his appointment in November.  In the meantime I recommend decreasing intake of high carb content foods.

## 2023-10-09 RX ORDER — METOPROLOL SUCCINATE 50 MG/1
50 TABLET, EXTENDED RELEASE ORAL
Qty: 90 TABLET | Refills: 1 | Status: SHIPPED | OUTPATIENT
Start: 2023-10-09 | End: 2023-11-07 | Stop reason: SDUPTHER

## 2023-10-13 ENCOUNTER — CLINICAL SUPPORT (OUTPATIENT)
Dept: CARDIOLOGY | Facility: CLINIC | Age: 43
End: 2023-10-13
Payer: OTHER GOVERNMENT

## 2023-10-13 DIAGNOSIS — D17.1 LIPOMA OF BACK: ICD-10-CM

## 2023-10-13 PROCEDURE — 93010 EKG 12-LEAD: ICD-10-PCS | Mod: S$PBB,,, | Performed by: STUDENT IN AN ORGANIZED HEALTH CARE EDUCATION/TRAINING PROGRAM

## 2023-10-13 PROCEDURE — 99212 OFFICE O/P EST SF 10 MIN: CPT | Mod: PBBFAC

## 2023-10-13 PROCEDURE — 93010 ELECTROCARDIOGRAM REPORT: CPT | Mod: S$PBB,,, | Performed by: STUDENT IN AN ORGANIZED HEALTH CARE EDUCATION/TRAINING PROGRAM

## 2023-10-13 PROCEDURE — 93005 ELECTROCARDIOGRAM TRACING: CPT | Mod: PBBFAC | Performed by: STUDENT IN AN ORGANIZED HEALTH CARE EDUCATION/TRAINING PROGRAM

## 2023-10-16 ENCOUNTER — NURSE TRIAGE (OUTPATIENT)
Dept: ADMINISTRATIVE | Facility: CLINIC | Age: 43
End: 2023-10-16

## 2023-10-16 ENCOUNTER — ANESTHESIA (OUTPATIENT)
Dept: SURGERY | Facility: HOSPITAL | Age: 43
End: 2023-10-16
Payer: OTHER GOVERNMENT

## 2023-10-16 ENCOUNTER — ANESTHESIA EVENT (OUTPATIENT)
Dept: SURGERY | Facility: HOSPITAL | Age: 43
End: 2023-10-16
Payer: OTHER GOVERNMENT

## 2023-10-16 ENCOUNTER — HOSPITAL ENCOUNTER (OUTPATIENT)
Facility: HOSPITAL | Age: 43
Discharge: HOME | End: 2023-10-16
Attending: STUDENT IN AN ORGANIZED HEALTH CARE EDUCATION/TRAINING PROGRAM | Admitting: STUDENT IN AN ORGANIZED HEALTH CARE EDUCATION/TRAINING PROGRAM
Payer: OTHER GOVERNMENT

## 2023-10-16 VITALS
TEMPERATURE: 98 F | RESPIRATION RATE: 16 BRPM | HEART RATE: 59 BPM | BODY MASS INDEX: 39.96 KG/M2 | HEIGHT: 72 IN | SYSTOLIC BLOOD PRESSURE: 129 MMHG | OXYGEN SATURATION: 100 % | WEIGHT: 295 LBS | DIASTOLIC BLOOD PRESSURE: 82 MMHG

## 2023-10-16 DIAGNOSIS — D17.1 LIPOMA OF BACK: ICD-10-CM

## 2023-10-16 PROBLEM — T88.4XXA HARD TO INTUBATE: Status: ACTIVE | Noted: 2023-10-16

## 2023-10-16 PROCEDURE — 27000165 HC TUBE, ETT CUFFED: Performed by: ANESTHESIOLOGY

## 2023-10-16 PROCEDURE — 36000707: Performed by: STUDENT IN AN ORGANIZED HEALTH CARE EDUCATION/TRAINING PROGRAM

## 2023-10-16 PROCEDURE — 27201480 HC GLIDESCOPE: Performed by: ANESTHESIOLOGY

## 2023-10-16 PROCEDURE — 71000033 HC RECOVERY, INTIAL HOUR: Performed by: STUDENT IN AN ORGANIZED HEALTH CARE EDUCATION/TRAINING PROGRAM

## 2023-10-16 PROCEDURE — 37000008 HC ANESTHESIA 1ST 15 MINUTES: Performed by: STUDENT IN AN ORGANIZED HEALTH CARE EDUCATION/TRAINING PROGRAM

## 2023-10-16 PROCEDURE — 27000655: Performed by: ANESTHESIOLOGY

## 2023-10-16 PROCEDURE — 27202344 HC EYESHIELD: Performed by: ANESTHESIOLOGY

## 2023-10-16 PROCEDURE — 27000716 HC OXISENSOR PROBE, ANY SIZE: Performed by: ANESTHESIOLOGY

## 2023-10-16 PROCEDURE — 25000003 PHARM REV CODE 250: Performed by: NURSE ANESTHETIST, CERTIFIED REGISTERED

## 2023-10-16 PROCEDURE — D9220A PRA ANESTHESIA: ICD-10-PCS | Mod: ANES,,, | Performed by: ANESTHESIOLOGY

## 2023-10-16 PROCEDURE — 21933 PR EXC TUMOR SOFT TISS BACK/FLANK SUBFASCIAL 5+CM: ICD-10-PCS | Mod: ,,, | Performed by: STUDENT IN AN ORGANIZED HEALTH CARE EDUCATION/TRAINING PROGRAM

## 2023-10-16 PROCEDURE — 88304 TISSUE EXAM BY PATHOLOGIST: CPT | Mod: 26,,, | Performed by: PATHOLOGY

## 2023-10-16 PROCEDURE — 36000706: Performed by: STUDENT IN AN ORGANIZED HEALTH CARE EDUCATION/TRAINING PROGRAM

## 2023-10-16 PROCEDURE — 27000510 HC BLANKET BAIR HUGGER ANY SIZE: Performed by: ANESTHESIOLOGY

## 2023-10-16 PROCEDURE — 71000016 HC POSTOP RECOV ADDL HR: Performed by: STUDENT IN AN ORGANIZED HEALTH CARE EDUCATION/TRAINING PROGRAM

## 2023-10-16 PROCEDURE — 25000003 PHARM REV CODE 250: Performed by: STUDENT IN AN ORGANIZED HEALTH CARE EDUCATION/TRAINING PROGRAM

## 2023-10-16 PROCEDURE — 63600175 PHARM REV CODE 636 W HCPCS: Performed by: NURSE ANESTHETIST, CERTIFIED REGISTERED

## 2023-10-16 PROCEDURE — D9220A PRA ANESTHESIA: Mod: CRNA,,, | Performed by: NURSE ANESTHETIST, CERTIFIED REGISTERED

## 2023-10-16 PROCEDURE — 27000689 HC BLADE LARYNGOSCOPE ANY SIZE: Performed by: ANESTHESIOLOGY

## 2023-10-16 PROCEDURE — D9220A PRA ANESTHESIA: ICD-10-PCS | Mod: CRNA,,, | Performed by: NURSE ANESTHETIST, CERTIFIED REGISTERED

## 2023-10-16 PROCEDURE — 88304 TISSUE EXAM BY PATHOLOGIST: CPT | Mod: TC,SUR | Performed by: STUDENT IN AN ORGANIZED HEALTH CARE EDUCATION/TRAINING PROGRAM

## 2023-10-16 PROCEDURE — 21933 EXC BACK TUM DEEP 5 CM/>: CPT | Mod: ,,, | Performed by: STUDENT IN AN ORGANIZED HEALTH CARE EDUCATION/TRAINING PROGRAM

## 2023-10-16 PROCEDURE — 37000009 HC ANESTHESIA EA ADD 15 MINS: Performed by: STUDENT IN AN ORGANIZED HEALTH CARE EDUCATION/TRAINING PROGRAM

## 2023-10-16 PROCEDURE — 88304 SURGICAL PATHOLOGY: ICD-10-PCS | Mod: 26,,, | Performed by: PATHOLOGY

## 2023-10-16 PROCEDURE — D9220A PRA ANESTHESIA: Mod: ANES,,, | Performed by: ANESTHESIOLOGY

## 2023-10-16 PROCEDURE — 71000015 HC POSTOP RECOV 1ST HR: Performed by: STUDENT IN AN ORGANIZED HEALTH CARE EDUCATION/TRAINING PROGRAM

## 2023-10-16 RX ORDER — CEFAZOLIN SODIUM 1 G/3ML
INJECTION, POWDER, FOR SOLUTION INTRAMUSCULAR; INTRAVENOUS
Status: DISCONTINUED | OUTPATIENT
Start: 2023-10-16 | End: 2023-10-16

## 2023-10-16 RX ORDER — HYDROMORPHONE HYDROCHLORIDE 2 MG/ML
0.5 INJECTION, SOLUTION INTRAMUSCULAR; INTRAVENOUS; SUBCUTANEOUS EVERY 5 MIN PRN
Status: DISCONTINUED | OUTPATIENT
Start: 2023-10-16 | End: 2023-10-16 | Stop reason: HOSPADM

## 2023-10-16 RX ORDER — DOCUSATE SODIUM 100 MG/1
100 CAPSULE, LIQUID FILLED ORAL 2 TIMES DAILY
Qty: 28 CAPSULE | Refills: 0 | Status: SHIPPED | OUTPATIENT
Start: 2023-10-16 | End: 2023-11-07

## 2023-10-16 RX ORDER — GLYCOPYRROLATE 0.2 MG/ML
INJECTION INTRAMUSCULAR; INTRAVENOUS
Status: DISCONTINUED | OUTPATIENT
Start: 2023-10-16 | End: 2023-10-16

## 2023-10-16 RX ORDER — FENTANYL CITRATE 50 UG/ML
INJECTION, SOLUTION INTRAMUSCULAR; INTRAVENOUS
Status: DISCONTINUED | OUTPATIENT
Start: 2023-10-16 | End: 2023-10-16

## 2023-10-16 RX ORDER — ONDANSETRON 2 MG/ML
INJECTION INTRAMUSCULAR; INTRAVENOUS
Status: DISCONTINUED | OUTPATIENT
Start: 2023-10-16 | End: 2023-10-16

## 2023-10-16 RX ORDER — MIDAZOLAM HYDROCHLORIDE 1 MG/ML
INJECTION INTRAMUSCULAR; INTRAVENOUS
Status: DISCONTINUED | OUTPATIENT
Start: 2023-10-16 | End: 2023-10-16

## 2023-10-16 RX ORDER — ONDANSETRON 2 MG/ML
4 INJECTION INTRAMUSCULAR; INTRAVENOUS DAILY PRN
Status: DISCONTINUED | OUTPATIENT
Start: 2023-10-16 | End: 2023-10-16 | Stop reason: HOSPADM

## 2023-10-16 RX ORDER — SODIUM CHLORIDE 9 MG/ML
INJECTION, SOLUTION INTRAVENOUS CONTINUOUS
Status: DISCONTINUED | OUTPATIENT
Start: 2023-10-16 | End: 2023-10-16 | Stop reason: HOSPADM

## 2023-10-16 RX ORDER — KETOROLAC TROMETHAMINE 30 MG/ML
INJECTION, SOLUTION INTRAMUSCULAR; INTRAVENOUS
Status: DISCONTINUED | OUTPATIENT
Start: 2023-10-16 | End: 2023-10-16

## 2023-10-16 RX ORDER — DIPHENHYDRAMINE HYDROCHLORIDE 50 MG/ML
25 INJECTION INTRAMUSCULAR; INTRAVENOUS EVERY 6 HOURS PRN
Status: DISCONTINUED | OUTPATIENT
Start: 2023-10-16 | End: 2023-10-16 | Stop reason: HOSPADM

## 2023-10-16 RX ORDER — PHENYLEPHRINE HYDROCHLORIDE 10 MG/ML
INJECTION INTRAVENOUS
Status: DISCONTINUED | OUTPATIENT
Start: 2023-10-16 | End: 2023-10-16

## 2023-10-16 RX ORDER — MEPERIDINE HYDROCHLORIDE 25 MG/ML
25 INJECTION INTRAMUSCULAR; INTRAVENOUS; SUBCUTANEOUS EVERY 10 MIN PRN
Status: DISCONTINUED | OUTPATIENT
Start: 2023-10-16 | End: 2023-10-16 | Stop reason: HOSPADM

## 2023-10-16 RX ORDER — DEXAMETHASONE SODIUM PHOSPHATE 4 MG/ML
INJECTION, SOLUTION INTRA-ARTICULAR; INTRALESIONAL; INTRAMUSCULAR; INTRAVENOUS; SOFT TISSUE
Status: DISCONTINUED | OUTPATIENT
Start: 2023-10-16 | End: 2023-10-16

## 2023-10-16 RX ORDER — PROPOFOL 10 MG/ML
VIAL (ML) INTRAVENOUS
Status: DISCONTINUED | OUTPATIENT
Start: 2023-10-16 | End: 2023-10-16

## 2023-10-16 RX ORDER — HYDROCODONE BITARTRATE AND ACETAMINOPHEN 5; 325 MG/1; MG/1
1 TABLET ORAL EVERY 6 HOURS PRN
Qty: 15 TABLET | Refills: 0 | Status: SHIPPED | OUTPATIENT
Start: 2023-10-16 | End: 2023-11-07

## 2023-10-16 RX ORDER — LIDOCAINE HYDROCHLORIDE 20 MG/ML
INJECTION, SOLUTION EPIDURAL; INFILTRATION; INTRACAUDAL; PERINEURAL
Status: DISCONTINUED | OUTPATIENT
Start: 2023-10-16 | End: 2023-10-16

## 2023-10-16 RX ORDER — MUPIROCIN 20 MG/G
OINTMENT TOPICAL DAILY
Qty: 30 G | Refills: 12 | Status: SHIPPED | OUTPATIENT
Start: 2023-10-16 | End: 2023-11-07

## 2023-10-16 RX ORDER — ROCURONIUM BROMIDE 10 MG/ML
INJECTION, SOLUTION INTRAVENOUS
Status: DISCONTINUED | OUTPATIENT
Start: 2023-10-16 | End: 2023-10-16

## 2023-10-16 RX ORDER — MORPHINE SULFATE 10 MG/ML
4 INJECTION INTRAMUSCULAR; INTRAVENOUS; SUBCUTANEOUS EVERY 5 MIN PRN
Status: DISCONTINUED | OUTPATIENT
Start: 2023-10-16 | End: 2023-10-16 | Stop reason: HOSPADM

## 2023-10-16 RX ADMIN — MIDAZOLAM HYDROCHLORIDE 2 MG: 1 INJECTION, SOLUTION INTRAMUSCULAR; INTRAVENOUS at 07:10

## 2023-10-16 RX ADMIN — PROPOFOL 150 MG: 10 INJECTION, EMULSION INTRAVENOUS at 07:10

## 2023-10-16 RX ADMIN — KETOROLAC TROMETHAMINE 60 MG: 30 INJECTION, SOLUTION INTRAMUSCULAR at 08:10

## 2023-10-16 RX ADMIN — DEXAMETHASONE SODIUM PHOSPHATE 8 MG: 4 INJECTION, SOLUTION INTRA-ARTICULAR; INTRALESIONAL; INTRAMUSCULAR; INTRAVENOUS; SOFT TISSUE at 08:10

## 2023-10-16 RX ADMIN — CEFAZOLIN 3 G: 1 INJECTION, POWDER, FOR SOLUTION INTRAMUSCULAR; INTRAVENOUS; PARENTERAL at 07:10

## 2023-10-16 RX ADMIN — LIDOCAINE HYDROCHLORIDE 100 MG: 20 INJECTION, SOLUTION INTRAVENOUS at 07:10

## 2023-10-16 RX ADMIN — ROCURONIUM BROMIDE 50 MG: 10 INJECTION, SOLUTION INTRAVENOUS at 07:10

## 2023-10-16 RX ADMIN — ROCURONIUM BROMIDE 30 MG: 10 INJECTION, SOLUTION INTRAVENOUS at 08:10

## 2023-10-16 RX ADMIN — SUGAMMADEX 200 MG: 100 INJECTION, SOLUTION INTRAVENOUS at 08:10

## 2023-10-16 RX ADMIN — ONDANSETRON 8 MG: 2 INJECTION INTRAMUSCULAR; INTRAVENOUS at 08:10

## 2023-10-16 RX ADMIN — GLYCOPYRROLATE 0.2 MG: 0.2 INJECTION INTRAMUSCULAR; INTRAVENOUS at 08:10

## 2023-10-16 RX ADMIN — SODIUM CHLORIDE: 9 INJECTION, SOLUTION INTRAVENOUS at 06:10

## 2023-10-16 RX ADMIN — FENTANYL CITRATE 100 MCG: 50 INJECTION INTRAMUSCULAR; INTRAVENOUS at 07:10

## 2023-10-16 RX ADMIN — PHENYLEPHRINE HYDROCHLORIDE 100 MCG: 10 INJECTION INTRAVENOUS at 08:10

## 2023-10-16 NOTE — BRIEF OP NOTE
[facility] - Periop Services  Brief Operative Note    Surgery Date: 10/16/2023     Surgeon(s) and Role:     * Jose M Meier DO - Primary    Assisting Surgeon: None    Pre-op Diagnosis:  Lipoma of back [D17.1]    Post-op Diagnosis:  Post-Op Diagnosis Codes:     * Lipoma of back [D17.1]    Procedure(s) (LRB):  EXCISION, LEFT BACK LIPOMA (Left)    Anesthesia: General    Operative Findings: fatty tumor [id number]    Estimated Blood Loss: 5cc         Specimens:   Specimen (24h ago, onward)       Start     Ordered    10/16/23 0833  Surgical Pathology  RELEASE UPON ORDERING         10/16/23 0833                      Discharge Note    OUTCOME: Patient tolerated treatment/procedure well without complication and is now ready for discharge.    DISPOSITION: Home or Self Care    FINAL DIAGNOSIS:  Back Fatty tumor    FOLLOWUP: In clinic    DISCHARGE INSTRUCTIONS:    Discharge Procedure Orders   Diet Adult Regular     Lifting restrictions   Order Comments: NO Lifting over 25 lb for the next 2 weeks     Remove dressing in 24 hours   Order Comments: Change dressing starting tomorrow.  Okay to shower over wound.  Dry and then apply mupirocin ointment and cover.  Do this for 5 days; after 5 days, just shower clean and cover.  Follow-up in clinic to have sutures removed.     Activity as tolerated

## 2023-10-16 NOTE — TELEPHONE ENCOUNTER
"Pt calls stating that he had a lipoma removed today. He was intubated for this procedure. Pt notes experiencing a sore throat s/p procedure. Just prior to calling pt said that he coughed and produced sputum with "dark red" blood in it. Pt describes his throat pain as feeling like strep throat.  OCP, Dr. Madhav Mendez, is called for further guidance.    Dr. Madhav Mendez advises that this is more than likely d/t pt's intubation. He states that pt should be fine but if he continues to notice any blood-tinged sputum he will need to be evaluated. Pt is informed. He verbalizes understanding and is instructed to call back with any new/worsening sxs, questions, or concerns.   Reason for Disposition   [1] Caller has URGENT question AND [2] triager unable to answer question    Additional Information   Negative: Sounds like a life-threatening emergency to the triager   Negative: [1] Widespread rash AND [2] bright red, sunburn-like   Negative: [1] SEVERE headache AND [2] after spinal (epidural) anesthesia   Negative: [1] Vomiting AND [2] persists > 4 hours   Negative: [1] Vomiting AND [2] abdomen looks much more swollen than usual   Negative: [1] Drinking very little AND [2] dehydration suspected (e.g., no urine > 12 hours, very dry mouth, very lightheaded)   Negative: Patient sounds very sick or weak to the triager   Negative: Sounds like a serious complication to the triager   Negative: Fever > 100.4 F (38.0 C)   Negative: [1] SEVERE post-op pain (e.g., excruciating, pain scale 8-10) AND [2] not controlled with pain medications    Protocols used: Post-Op Symptoms and [id number]    "

## 2023-10-16 NOTE — ANESTHESIA PREPROCEDURE EVALUATION
10/16/2023  Jigar Larson is a 43 y.o., male.      Pre-op Assessment          Review of Systems          .

## 2023-10-16 NOTE — PROGRESS NOTES
935 RELEASED TO [facility] RN AWAKE, ALERT. V/S 148/[id number]%. WIFE AT BEDSIDE.

## 2023-10-16 NOTE — ANESTHESIA PROCEDURE NOTES
Intubation    Date/Time: 10/16/2023 7:45 AM    Performed by: Mona Cifuentes CRNA  Authorized by: Ariel Johnson MD    Intubation:     Induction:  Intravenous    Intubated:  Postinduction    Mask Ventilation:  Easy with oral airway    Attempts:  3    Attempted By:  CRNA    Method of Intubation:  Direct    Blade:  Martinez 2    Laryngeal View Grade: Grade III - only epiglottis visible      Laryngeal View Grade comment:  Cords not visualized,    Attempted By (2nd Attempt):  CRNA    Method of Intubation (2nd Attempt):  Video laryngoscopy (short in glidescope, unable to visualize anything)    Laryngeal View Grade (2nd Attempt): Grade IV - neither epiglottis nor glottis seen      Attempted By (3rd Attempt):  Staff anesthesiologist    Method of Intubation (3rd Attempt):  Direct    Blade (3rd Attempt):  Bethany 4    Laryngeal View Grade (3rd Attempt): Grade IIb - only arytenoids and epiglottis seen      Difficult Airway Encountered?: Yes      Complications:  None    Airway Device:  Oral endotracheal tube    Airway Device Size:  8.0    Style/Cuff Inflation:  Cuffed (inflated to minimal occlusive pressure)    Inflation Amount (mL):  6    Tube secured:  21    Secured at:  The lips    Placement Verified By:  Capnometry    Complicating Factors:  Large/floppy epiglottis, obesity and short neck    Findings Post-Intubation:  BS equal bilateral and atraumatic/condition of teeth unchanged

## 2023-10-16 NOTE — TRANSFER OF CARE
Anesthesia Transfer of Care Note    Patient: Jigar Larson    Procedure(s) Performed: Procedure(s) (LRB):  EXCISION, LEFT BACK LIPOMA (Left)    Patient location: PACU    Anesthesia Type: general    Transport from OR: Transported from OR on 100% O2 by closed face mask with adequate spontaneous ventilation    Post pain: adequate analgesia    Post assessment: no apparent anesthetic complications and tolerated procedure well    Post vital signs: stable    Level of consciousness: responds to stimulation and awake    Nausea/Vomiting: no nausea/vomiting    Complications: none    Transfer of care protocol was followed      Last vitals:   Visit Vitals  /64 (BP Location: Left arm, Patient Position: Lying)   Pulse 79   Temp 36.5 °C (97.7 °F) (Oral)   Resp 18   Ht 6' (1.829 m)   Wt 133.8 kg (295 lb)   SpO2 100%   BMI 40.01 kg/m²

## 2023-10-16 NOTE — OP NOTE
[facility] - Periop Services  Surgery Department  Operative Note    SUMMARY     Date of Procedure: 10/16/2023     Procedure: Procedure(s) (LRB):  EXCISION, LEFT BACK LIPOMA (Left)     Surgeon(s) and Role:     * Jose M Meire, DO - Primary    Assisting Surgeon: None    Pre-Operative Diagnosis: Lipoma of back [D17.1]    Post-Operative Diagnosis: Post-Op Diagnosis Codes:     * Lipoma of back [D17.1]    Anesthesia: General    Operative Findings (including complications, if any): Back fatty tumor    Description of Technical Procedures:  Patient was taken the operating room and placed on the operating table in the prone position.  Patient underwent general anesthesia per the anesthesia team.  The back was then prepped and draped in usual sterile fashion.  We localize the skin over the fatty tumor with 0.25% Marcaine plain.  We then made an 8 cm transverse incision with a 15 blade scalpel and dissected down through the subcutaneous tissue with electrocautery.  We entered a cavity with a large fatty tumor which was partially underneath the latissimus dorsi muscle.  We elevated the muscle and circumferentially transected the fatty tumor with electrocautery.  The fatty tumor measured 5 x 7 x 3 cm and was sent to pathology.  We then irrigated the cavity and suctioned clear.  We approximated subcutaneous tissue with multiple 3-0 Vicryl simple sutures.  Deep dermal layer was approximated with Insorb stapler and skin edges were approximated with 2-0 nylon horizontal mattress sutures.  The skin was cleaned and dried, sterile dressing applied.  Patient was awakened, extubated and taken the PACU in stable condition.  Patient tolerated procedure well.        Estimated Blood Loss (EBL): 5cc           Implants: * No implants in log *    Specimens:   Specimen (24h ago, onward)       Start     Ordered    10/16/23 0833  Surgical Pathology  RELEASE UPON ORDERING         10/16/23 0833                            Condition:  Good    Disposition: PACU - hemodynamically stable.    Attestation: I was present and scrubbed for the entire procedure.

## 2023-10-16 NOTE — PROGRESS NOTES
901 RECEIVED TO RR AWAKE, ALERT. HOB ELEVATED. O2 VIA FM. COLOR PINK. NO RESP. DISTRESS NOTED. DRESSING D/I. LEFT UPPER BACK. FOLLOWS COMMANDS, MARJORIE. SCD HOSE IN PROGRESS. IV INFUSING WELL RIGHT FOREARM 22G. CATH. NO C/O PAIN. SEE FLOW SHEET.    931 AWAKE, ALERT. NO C/O PAIN. DRESSING D/I. NO DISTRESS NOTED. TRANSFERRED TO ROOM.

## 2023-10-16 NOTE — ANESTHESIA PREPROCEDURE EVALUATION
10/16/2023  Jigar Larson is a 43 y.o., male.      Pre-op Assessment    I have reviewed the Patient Summary Reports.    I have reviewed the NPO Status.   I have reviewed the Medications.     Review of Systems         Anesthesia Plan  Type of Anesthesia, risks & benefits discussed:    Anesthesia Type: Gen ETT  Intra-op Monitoring Plan: Standard ASA Monitors  Induction:  IV  Informed Consent: Informed consent signed with the Patient and all parties understand the risks and agree with anesthesia plan.  All questions answered.   ASA Score: 3    Ready For Surgery From Anesthesia Perspective.     .  NPO since MN  No anesthetic complications  NKDA    Medical History   Rheumatoid arthritis GERD (gastroesophageal reflux disease)   Palpitations Sleep apnea   Anxiety disorder, unspecified      Airway exam deferred (COVID precautions); adequate ROM at neck.

## 2023-10-30 ENCOUNTER — OFFICE VISIT (OUTPATIENT)
Dept: SURGERY | Facility: CLINIC | Age: 43
End: 2023-10-30
Payer: OTHER GOVERNMENT

## 2023-10-30 DIAGNOSIS — D17.1 LIPOMA OF BACK: Primary | ICD-10-CM

## 2023-10-30 PROCEDURE — 99024 POSTOP FOLLOW-UP VISIT: CPT | Mod: ,,, | Performed by: STUDENT IN AN ORGANIZED HEALTH CARE EDUCATION/TRAINING PROGRAM

## 2023-10-30 PROCEDURE — 99024 PR POST-OP FOLLOW-UP VISIT: ICD-10-PCS | Mod: ,,, | Performed by: STUDENT IN AN ORGANIZED HEALTH CARE EDUCATION/TRAINING PROGRAM

## 2023-10-30 PROCEDURE — 99213 OFFICE O/P EST LOW 20 MIN: CPT | Mod: PBBFAC | Performed by: STUDENT IN AN ORGANIZED HEALTH CARE EDUCATION/TRAINING PROGRAM

## 2023-10-30 NOTE — PROGRESS NOTES
43-year-old  male presents the clinic for postop evaluation status post excision of a back fatty tumor.  Patient doing well, no significant pain.  Denies any chest pain/shortness of breath, nausea/vomiting/diarrhea, fever/chills.    Incision clean dry and intact with horizontal mattress sutures in place.  No signs of infection or drainage.      Sutures removed without difficulty.  No need for further follow-up.  Pathology showed lipoma.

## 2023-11-07 ENCOUNTER — OFFICE VISIT (OUTPATIENT)
Dept: CARDIOLOGY | Facility: CLINIC | Age: 43
End: 2023-11-07
Payer: OTHER GOVERNMENT

## 2023-11-07 VITALS
WEIGHT: 312 LBS | HEART RATE: 82 BPM | DIASTOLIC BLOOD PRESSURE: 70 MMHG | RESPIRATION RATE: 18 BRPM | SYSTOLIC BLOOD PRESSURE: 118 MMHG | HEIGHT: 70 IN | OXYGEN SATURATION: 97 % | BODY MASS INDEX: 44.67 KG/M2

## 2023-11-07 DIAGNOSIS — Z86.79 HISTORY OF ATRIAL FIBRILLATION: ICD-10-CM

## 2023-11-07 DIAGNOSIS — R00.2 PALPITATIONS: ICD-10-CM

## 2023-11-07 DIAGNOSIS — I47.29 NSVT (NONSUSTAINED VENTRICULAR TACHYCARDIA): ICD-10-CM

## 2023-11-07 DIAGNOSIS — Q24.5 ANOMALOUS CORONARY ARTERY ORIGIN: ICD-10-CM

## 2023-11-07 PROCEDURE — 99213 OFFICE O/P EST LOW 20 MIN: CPT | Mod: S$PBB,,, | Performed by: NURSE PRACTITIONER

## 2023-11-07 PROCEDURE — 99214 OFFICE O/P EST MOD 30 MIN: CPT | Mod: PBBFAC | Performed by: NURSE PRACTITIONER

## 2023-11-07 PROCEDURE — 99213 PR OFFICE/OUTPT VISIT, EST, LEVL III, 20-29 MIN: ICD-10-PCS | Mod: S$PBB,,, | Performed by: NURSE PRACTITIONER

## 2023-11-07 RX ORDER — METOPROLOL SUCCINATE 50 MG/1
50 TABLET, EXTENDED RELEASE ORAL DAILY
Qty: 90 TABLET | Refills: 3 | Status: SHIPPED | OUTPATIENT
Start: 2023-11-07

## 2023-11-07 NOTE — PROGRESS NOTES
"PCP: Rasta Carmichael MD    Referring Provider:     Subjective:   Jigar Larson is a 43 y.o. male with hx of atrial fibrillation in 2010, sleep apnea compliant with cpap, and panic attacks who presents for 6 month follow up.    Pt underwent LHC  6/6/2023 for abnormal stress test which revealed normal coronaries with anomalous Lcx. He was referred for cardiac CTA on 6/29 that was unremarkable. He reports he is doing well, now on VIIBRYD daily for anxiety/panic attacks and is no longer having palpitations or chest pain.     5/2/2023::  He has had multiple ER and clinic visits since last seen on 4/4/23 with continued chest pain and palpitations. ZIO with no significant arrhythmia noted with 22 patient triggered events. He was recently started on buspar for anxiety and feels that it is helping a little. Discussed repeating stress test for thoroughness and to help him feel less anxious about his heart.     4/4/2023:   Patient reports having more frequent episodes of palpitations, went to ED Sunday 4/2/2023 and workup was negative according to him. ER records and labs reviewed CBC and CMP normal, TSH normal on 11/12/2022. EKG NSR.      Patient with c/o more frequent episodes of palpitations with sob and chest tightness. States his heart rate gets as high as 160s on his apple watch but doesn't usually last very long. He has completely stopped drinking coffee and has been drinking about 64oz of water daily.     Reports there are no aggravating or alleviating factors related to these episodes but admits that when he start "feeling funny" he becomes nervous/anxious and is scared he is going to drop dead. Concerned it may possibly be panic attacks.             Fhx: Family history negative for sudden death.  Shx: Nonsmoker, no alcohol or drug abuse    EKG 4/4/2023: NSR with T wave inversion in III & aVF             11/8/22: NSR with T wave inversion in III & aVF, no changes when compared to previous EKG 11/8/2021           " 11/8/21:  Normal sinus rhythm.    24 Holter 8/4/2020:  Maximum heart rate of 137 bpm was sinus tachycardia. Short run of nonsustained ventricular tachycardia, 5 beats. Overall PVC burden is less than 1%  ZIO monitor 4/15/2023: No significant findings with 22 patient triggered events    ECHO 8/4/2020: Normal left ventricular cavity with overall normal systolic function. LVEF 55-60%.                              No significant valvular abnormalities were noted.   C 8/25/2020:  Nonobstructive coronary artery disease.                             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        Lab Results   Component Value Date     10/13/2023    K 3.8 10/13/2023     10/13/2023    CO2 27 10/13/2023    BUN 15 10/13/2023    CREATININE 1.17 10/13/2023    CALCIUM 8.6 10/13/2023    ANIONGAP 9 10/13/2023       Lab Results   Component Value Date    CHOL 173 10/02/2023    CHOL 159 06/08/2023    CHOL 162 04/18/2022     Lab Results   Component Value Date    HDL 50 10/02/2023    HDL 45 06/08/2023    HDL 45 06/08/2023     Lab Results   Component Value Date    LDLCALC 103 10/02/2023    LDLCALC 93 06/08/2023    LDLCALC 95 04/18/2022     Lab Results   Component Value Date    TRIG 98 10/02/2023    TRIG 104 06/08/2023    TRIG 109 04/18/2022     Lab Results   Component Value Date    CHOLHDL 3.5 10/02/2023    CHOLHDL 3.5 06/08/2023    CHOLHDL 3.6 04/18/2022       Lab Results   Component Value Date    WBC 8.86 10/13/2023    HGB 13.6 10/13/2023    HCT 42.0 10/13/2023    MCV 81.9 10/13/2023     10/13/2023           Current Outpatient Medications:     cetirizine (ZYRTEC) 10 MG tablet, Take 10 mg by mouth every evening., Disp: , Rfl:     clonazePAM (KLONOPIN) 0.5 MG tablet, Take 0.5 mg by mouth nightly as needed., Disp: , Rfl:     etanercept (ENBREL) 50 mg/mL (1 mL) injection, Inject 50 mg into the skin once a week., Disp: , Rfl:     gabapentin (NEURONTIN) 300 MG capsule, Take 1 capsule (300 mg  "total) by mouth 2 (two) times daily., Disp: 180 capsule, Rfl: 1    mv-mn/iron/folic acid/herb 190 (VITAMIN D3 COMPLETE ORAL), Take by mouth once daily., Disp: , Rfl:     pantoprazole (PROTONIX) 40 MG tablet, Take 40 mg by mouth once daily., Disp: , Rfl:     vilazodone (VIIBRYD) 20 mg Tab, Take 20 mg by mouth once daily., Disp: , Rfl:     metoprolol succinate (TOPROL-XL) 50 MG 24 hr tablet, Take 1 tablet (50 mg total) by mouth once daily., Disp: 90 tablet, Rfl: 3    Review of Systems   Constitutional:  Negative for chills, fever and malaise/fatigue.   HENT: Negative.     Eyes: Negative.    Respiratory:  Negative for shortness of breath.    Cardiovascular:  Negative for chest pain, palpitations, orthopnea and leg swelling.   Gastrointestinal: Negative.    Musculoskeletal: Negative.    Skin: Negative.    Neurological:  Negative for dizziness, loss of consciousness and headaches.   Psychiatric/Behavioral:  The patient is not nervous/anxious.          Objective:   /70   Pulse 82   Resp 18   Ht 5' 10" (1.778 m)   Wt (!) 141.5 kg (312 lb)   SpO2 97%   BMI 44.77 kg/m²     Physical Exam  Vitals reviewed.   Constitutional:       Appearance: He is obese.   Neck:      Vascular: No carotid bruit.   Cardiovascular:      Rate and Rhythm: Normal rate and regular rhythm.      Pulses: Normal pulses.      Heart sounds: Normal heart sounds.   Pulmonary:      Effort: Pulmonary effort is normal.      Breath sounds: Normal breath sounds. No wheezing, rhonchi or rales.   Abdominal:      General: Bowel sounds are normal.      Palpations: Abdomen is soft.   Musculoskeletal:      Right lower leg: No edema.      Left lower leg: No edema.   Skin:     General: Skin is warm and dry.      Coloration: Skin is not jaundiced or pale.   Neurological:      Mental Status: He is alert and oriented to person, place, and time.   Psychiatric:         Mood and Affect: Mood normal.         Behavior: Behavior normal.         Assessment:     1. NSVT " (nonsustained ventricular tachycardia)        2. History of atrial fibrillation        3. Palpitations        4. Anomalous coronary artery origin                Plan:   NSVT (nonsustained ventricular tachycardia)  Single episode during ETT 8/4/2020  Echo 8/2020 normal  [facility] 8/2020 normal  Reports hx of atrial fibrillation 2010  ZIO monitor 4/15/2023: Results reviewed, no significant findings with 22 patient triggered events; Max rate 159, Avg rate 79, 5 beat run of SVT    Continue Toprol XL 50mg daily    History of atrial fibrillation  Reports hx of afib 2010    Palpitations  ZIO monitor 4/15/2023: Results reviewed, no significant findings with 22 patient triggered events; Max rate 159, Avg rate 79, 5 beat run of SVT     Resolved on VIIBRYD and Toprol XL  Continue current medications      Anomalous coronary artery origin  [facility] 6/6/2023: normal coronaries and anomalous Lcx  Cardiac CTA 6/29/2023: Unremarkable    Patient would like to continue with yearly follow up  Follow up in 1 year, or sooner if needed

## 2023-11-07 NOTE — ASSESSMENT & PLAN NOTE
[facility] 6/6/2023: normal coronaries and anomalous Lcx  Cardiac CTA 6/29/2023: Unremarkable

## 2023-11-07 NOTE — ASSESSMENT & PLAN NOTE
ZIO monitor 4/15/2023: Results reviewed, no significant findings with 22 patient triggered events; Max rate 159, Avg rate 79, 5 beat run of SVT     Resolved on VIIBRYD and Toprol XL  Continue current medications

## 2024-01-03 ENCOUNTER — OFFICE VISIT (OUTPATIENT)
Dept: NEUROLOGY | Facility: CLINIC | Age: 44
End: 2024-01-03
Payer: OTHER GOVERNMENT

## 2024-01-03 VITALS
BODY MASS INDEX: 44.67 KG/M2 | HEIGHT: 70 IN | SYSTOLIC BLOOD PRESSURE: 140 MMHG | DIASTOLIC BLOOD PRESSURE: 86 MMHG | WEIGHT: 312 LBS | OXYGEN SATURATION: 98 % | RESPIRATION RATE: 16 BRPM | HEART RATE: 79 BPM

## 2024-01-03 DIAGNOSIS — R20.0 NUMBNESS AND TINGLING IN LEFT HAND: Primary | ICD-10-CM

## 2024-01-03 DIAGNOSIS — R20.2 NUMBNESS AND TINGLING IN LEFT HAND: Primary | ICD-10-CM

## 2024-01-03 PROCEDURE — 99215 OFFICE O/P EST HI 40 MIN: CPT | Mod: PBBFAC | Performed by: PSYCHIATRY & NEUROLOGY

## 2024-01-03 PROCEDURE — 99214 OFFICE O/P EST MOD 30 MIN: CPT | Mod: S$PBB,,, | Performed by: PSYCHIATRY & NEUROLOGY

## 2024-01-03 RX ORDER — LORAZEPAM 1 MG/1
1 TABLET ORAL ONCE AS NEEDED
Qty: 1 TABLET | Refills: 0 | Status: SHIPPED | OUTPATIENT
Start: 2024-01-03

## 2024-01-03 NOTE — PROGRESS NOTES
Subjective:       Patient ID: Jigar Larson is a 43 y.o. male     Chief Complaint:    Chief Complaint   Patient presents with    Numbness     Pt. States medication working great.        Allergies:  Patient has no known allergies.    Current Medications:    Outpatient Encounter Medications as of 1/3/2024   Medication Sig Dispense Refill    cetirizine (ZYRTEC) 10 MG tablet Take 10 mg by mouth every evening.      clonazePAM (KLONOPIN) 0.5 MG tablet Take 0.5 mg by mouth nightly as needed.      etanercept (ENBREL) 50 mg/mL (1 mL) injection Inject 50 mg into the skin once a week.      gabapentin (NEURONTIN) 300 MG capsule Take 1 capsule (300 mg total) by mouth 2 (two) times daily. 180 capsule 1    metoprolol succinate (TOPROL-XL) 50 MG 24 hr tablet Take 1 tablet (50 mg total) by mouth once daily. 90 tablet 3    mv-mn/iron/folic acid/herb 190 (VITAMIN D3 COMPLETE ORAL) Take by mouth once daily.      pantoprazole (PROTONIX) 40 MG tablet Take 40 mg by mouth once daily.      vilazodone (VIIBRYD) 20 mg Tab Take 20 mg by mouth once daily.       No facility-administered encounter medications on file as of 1/3/2024.       History of Present Illness  42 yo WM in clinic for f/u of LUE paresthesias - Pt did not get NCV/EMG nor MRI cervical that I ordered - he was no-show for first NCV then had to be rescheduled due to Dr Faye's departure  Mri C spine not done either   Trial of Hiral 300mg bid has worked quite well at alleviating symptoms        Past Medical History:   Diagnosis Date    Anxiety disorder, unspecified     GERD (gastroesophageal reflux disease)     Palpitations     Rheumatoid arthritis     Sleep apnea        Past Surgical History:   Procedure Laterality Date    LEFT HEART CATHETERIZATION N/A 06/06/2023    Procedure: Left heart cath;  Surgeon: Fabio Lacy MD;  Location: [facility] CATH LAB;  Service: Cardiology;  Laterality: N/A;    LIPOMA RESECTION      LIPOMA RESECTION Left 10/16/2023     "Procedure: EXCISION, LEFT BACK LIPOMA;  Surgeon: Jose M Meier DO;  Location: [facility];  Service: General;  Laterality: Left;       Social History  Mr. Larson  reports that he has never smoked. He has never been exposed to tobacco smoke. He has never used smokeless tobacco. He reports that he does not drink alcohol and does not use drugs.    Family History  Mr.'s Larson family history includes Allergies in his brother; Lung cancer in his mother.    Review of Systems  Review of Systems   Neurological:  Positive for tingling and sensory change.   All other systems reviewed and are negative.     Objective:   BP (!) 158/99 (BP Location: Right arm, Patient Position: Sitting, BP Method: Large (Manual))   Pulse 79   Resp 16   Ht 5' 10" (1.778 m)   Wt (!) 141.5 kg (312 lb)   SpO2 98%   BMI 44.77 kg/m²    NEUROLOGICAL EXAMINATION:     MENTAL STATUS   Oriented to person, place, and time.   Level of consciousness: alert  Knowledge: good.     CRANIAL NERVES   Cranial nerves II through XII intact.     MOTOR EXAM     Strength   Strength 5/5 throughout.     SENSORY EXAM        Paresthesias LUE     GAIT AND COORDINATION     Gait  Gait: normal       Physical Exam  Vitals reviewed.   Constitutional:       Appearance: He is obese.   Neurological:      General: No focal deficit present.      Mental Status: He is alert and oriented to person, place, and time. Mental status is at baseline.      Cranial Nerves: Cranial nerves 2-12 are intact.      Motor: Motor strength is normal.     Gait: Gait is intact.        Assessment:     There are no diagnoses linked to this encounter.     Primary Diagnosis and ICD10  No primary diagnosis found.    Plan:     Patient Instructions   Will reschedule both MRI C spine and NCV Ue's  Cont Hiral at dose and freq tolerated   F/u 3 months    There are no discontinued medications.    Requested Prescriptions      No prescriptions requested or ordered in this encounter       "

## 2024-01-03 NOTE — PATIENT INSTRUCTIONS
Will reschedule both MRI C spine and NCV Ue's  Cont Hiral at dose and freq tolerated   F/u 3 months

## 2024-01-04 ENCOUNTER — HOSPITAL ENCOUNTER (EMERGENCY)
Facility: HOSPITAL | Age: 44
Discharge: HOME | End: 2024-01-04
Payer: OTHER GOVERNMENT

## 2024-01-04 VITALS
HEART RATE: 89 BPM | BODY MASS INDEX: 44.38 KG/M2 | TEMPERATURE: 98 F | DIASTOLIC BLOOD PRESSURE: 88 MMHG | HEIGHT: 70 IN | OXYGEN SATURATION: 97 % | RESPIRATION RATE: 18 BRPM | WEIGHT: 310 LBS | SYSTOLIC BLOOD PRESSURE: 172 MMHG

## 2024-01-04 DIAGNOSIS — R07.81 PLEURITIC CHEST PAIN: Primary | ICD-10-CM

## 2024-01-04 DIAGNOSIS — R07.9 CHEST PAIN: ICD-10-CM

## 2024-01-04 LAB
ALBUMIN SERPL BCP-MCNC: 3.6 G/DL (ref 3.5–5)
ALBUMIN/GLOB SERPL: 0.9 {RATIO}
ALP SERPL-CCNC: 132 U/L (ref 45–115)
ALT SERPL W P-5'-P-CCNC: 58 U/L (ref 16–61)
ANION GAP SERPL CALCULATED.3IONS-SCNC: 11 MMOL/L (ref 7–16)
AST SERPL W P-5'-P-CCNC: 30 U/L (ref 15–37)
BASOPHILS # BLD AUTO: 0.04 K/UL (ref 0–0.2)
BASOPHILS NFR BLD AUTO: 0.5 % (ref 0–1)
BILIRUB SERPL-MCNC: 0.4 MG/DL (ref ?–1.2)
BUN SERPL-MCNC: 15 MG/DL (ref 7–18)
BUN/CREAT SERPL: 12 (ref 6–20)
CALCIUM SERPL-MCNC: 9.2 MG/DL (ref 8.5–10.1)
CHLORIDE SERPL-SCNC: 108 MMOL/L (ref 98–107)
CO2 SERPL-SCNC: 26 MMOL/L (ref 21–32)
CREAT SERPL-MCNC: 1.23 MG/DL (ref 0.7–1.3)
D DIMER PPP FEU-MCNC: 0.52 ΜG/ML (ref 0–0.47)
DIFFERENTIAL METHOD BLD: ABNORMAL
EGFR (NO RACE VARIABLE) (RUSH/TITUS): 74 ML/MIN/1.73M2
EOSINOPHIL # BLD AUTO: 0.08 K/UL (ref 0–0.5)
EOSINOPHIL NFR BLD AUTO: 0.9 % (ref 1–4)
ERYTHROCYTE [DISTWIDTH] IN BLOOD BY AUTOMATED COUNT: 13.5 % (ref 11.5–14.5)
FLUAV AG UPPER RESP QL IA.RAPID: NEGATIVE
FLUBV AG UPPER RESP QL IA.RAPID: NEGATIVE
GLOBULIN SER-MCNC: 4.2 G/DL (ref 2–4)
GLUCOSE SERPL-MCNC: 133 MG/DL (ref 74–106)
HCT VFR BLD AUTO: 42.6 % (ref 40–54)
HGB BLD-MCNC: 14.1 G/DL (ref 13.5–18)
IMM GRANULOCYTES # BLD AUTO: 0.03 K/UL (ref 0–0.04)
IMM GRANULOCYTES NFR BLD: 0.3 % (ref 0–0.4)
LYMPHOCYTES # BLD AUTO: 1.99 K/UL (ref 1–4.8)
LYMPHOCYTES NFR BLD AUTO: 22.7 % (ref 27–41)
MCH RBC QN AUTO: 26.8 PG (ref 27–31)
MCHC RBC AUTO-ENTMCNC: 33.1 G/DL (ref 32–36)
MCV RBC AUTO: 81 FL (ref 80–96)
MONOCYTES # BLD AUTO: 0.51 K/UL (ref 0–0.8)
MONOCYTES NFR BLD AUTO: 5.8 % (ref 2–6)
MPC BLD CALC-MCNC: 10 FL (ref 9.4–12.4)
NEUTROPHILS # BLD AUTO: 6.1 K/UL (ref 1.8–7.7)
NEUTROPHILS NFR BLD AUTO: 69.8 % (ref 53–65)
NRBC # BLD AUTO: 0 X10E3/UL
NRBC, AUTO (.00): 0 %
NT-PROBNP SERPL-MCNC: 45 PG/ML (ref 1–125)
PLATELET # BLD AUTO: 260 K/UL (ref 150–400)
POTASSIUM SERPL-SCNC: 4.8 MMOL/L (ref 3.5–5.1)
PROT SERPL-MCNC: 7.8 G/DL (ref 6.4–8.2)
RBC # BLD AUTO: 5.26 M/UL (ref 4.6–6.2)
SARS-COV-2 RDRP RESP QL NAA+PROBE: NEGATIVE
SODIUM SERPL-SCNC: 140 MMOL/L (ref 136–145)
TROPONIN I SERPL DL<=0.01 NG/ML-MCNC: 5.4 PG/ML
TROPONIN I SERPL DL<=0.01 NG/ML-MCNC: 6.9 PG/ML
WBC # BLD AUTO: 8.75 K/UL (ref 4.5–11)

## 2024-01-04 PROCEDURE — 85379 FIBRIN DEGRADATION QUANT: CPT | Performed by: NURSE PRACTITIONER

## 2024-01-04 PROCEDURE — 99284 EMERGENCY DEPT VISIT MOD MDM: CPT | Mod: ,,, | Performed by: NURSE PRACTITIONER

## 2024-01-04 PROCEDURE — 25000003 PHARM REV CODE 250: Performed by: NURSE PRACTITIONER

## 2024-01-04 PROCEDURE — 93005 ELECTROCARDIOGRAM TRACING: CPT

## 2024-01-04 PROCEDURE — 87635 SARS-COV-2 COVID-19 AMP PRB: CPT | Performed by: NURSE PRACTITIONER

## 2024-01-04 PROCEDURE — 83880 ASSAY OF NATRIURETIC PEPTIDE: CPT | Performed by: NURSE PRACTITIONER

## 2024-01-04 PROCEDURE — 99285 EMERGENCY DEPT VISIT HI MDM: CPT | Mod: 25

## 2024-01-04 PROCEDURE — 80053 COMPREHEN METABOLIC PANEL: CPT | Performed by: NURSE PRACTITIONER

## 2024-01-04 PROCEDURE — 93010 ELECTROCARDIOGRAM REPORT: CPT | Mod: ,,, | Performed by: STUDENT IN AN ORGANIZED HEALTH CARE EDUCATION/TRAINING PROGRAM

## 2024-01-04 PROCEDURE — 84484 ASSAY OF TROPONIN QUANT: CPT | Performed by: NURSE PRACTITIONER

## 2024-01-04 PROCEDURE — 87804 INFLUENZA ASSAY W/OPTIC: CPT | Performed by: NURSE PRACTITIONER

## 2024-01-04 PROCEDURE — 85025 COMPLETE CBC W/AUTO DIFF WBC: CPT | Performed by: NURSE PRACTITIONER

## 2024-01-04 RX ORDER — ASPIRIN 325 MG
325 TABLET ORAL
Status: COMPLETED | OUTPATIENT
Start: 2024-01-04 | End: 2024-01-04

## 2024-01-04 RX ORDER — METHYLPREDNISOLONE 4 MG/1
TABLET ORAL
Qty: 1 EACH | Refills: 0 | Status: SHIPPED | OUTPATIENT
Start: 2024-01-04 | End: 2024-01-24 | Stop reason: ALTCHOICE

## 2024-01-04 RX ORDER — IBUPROFEN 800 MG/1
800 TABLET ORAL EVERY 6 HOURS PRN
Qty: 20 TABLET | Refills: 0 | Status: SHIPPED | OUTPATIENT
Start: 2024-01-04

## 2024-01-04 RX ADMIN — ASPIRIN 325 MG ORAL TABLET 325 MG: 325 PILL ORAL at 06:01

## 2024-01-04 NOTE — ED PROVIDER NOTES
Encounter Date: 1/4/2024       History     Chief Complaint   Patient presents with    Pleurisy     43 year old male presents to ED with complaint of chest pain and pain with inspiration. Patient states pain started on last night with worsening on today. He reports on yesterday he did some work in his bathroom and was laying on his back reaching up with his left arm. Patient reports he was also sleeping in a recliner in his wife's hospital room on last night. He states pain initially started on last night to his left flank/side and on today pain has came across to his left chest. As he was driving his wife home from the hospital, the pain worsened to the point he was unable to take a deep breath and he had to pull over. He denies cough, fever, chills, nausea/vomiting.         Review of patient's allergies indicates:  No Known Allergies  Past Medical History:   Diagnosis Date    Anxiety disorder, unspecified     GERD (gastroesophageal reflux disease)     Palpitations     Rheumatoid arthritis     Sleep apnea      Past Surgical History:   Procedure Laterality Date    LEFT HEART CATHETERIZATION N/A 06/06/2023    Procedure: Left heart cath;  Surgeon: Fabio Lacy MD;  Location: [facility] CATH LAB;  Service: Cardiology;  Laterality: N/A;    LIPOMA RESECTION      LIPOMA RESECTION Left 10/16/2023    Procedure: EXCISION, LEFT BACK LIPOMA;  Surgeon: Jose M Meier DO;  Location: [facility] OR;  Service: General;  Laterality: Left;     Family History   Problem Relation Age of Onset    Lung cancer Mother     Allergies Brother      Social History     Tobacco Use    Smoking status: Never     Passive exposure: Never    Smokeless tobacco: Never   Substance Use Topics    Alcohol use: Never    Drug use: Never     Review of Systems   Constitutional:  Negative for chills and fever.   HENT:  Negative for sinus pressure and sinus pain.    Eyes:  Negative for photophobia and visual disturbance.   Respiratory:  Positive for  shortness of breath. Negative for cough.    Cardiovascular:  Positive for chest pain. Negative for palpitations.   Gastrointestinal:  Negative for nausea and vomiting.   Endocrine: Negative for cold intolerance and heat intolerance.   Musculoskeletal:  Positive for arthralgias. Negative for gait problem.   Skin:  Negative for color change and wound.   Allergic/Immunologic: Negative for environmental allergies and food allergies.   Neurological:  Negative for dizziness and weakness.   Hematological:  Negative for adenopathy. Does not bruise/bleed easily.   Psychiatric/Behavioral:  Negative for agitation and confusion.        Physical Exam     Initial Vitals [01/04/24 1515]   BP Pulse Resp Temp SpO2   (!) 172/88 89 18 97.9 °F (36.6 °C) 97 %      MAP       --         Physical Exam    Nursing note and vitals reviewed.  Constitutional: He appears well-developed and well-nourished.   HENT:   Head: Normocephalic and atraumatic.   Eyes: EOM are normal. Pupils are equal, round, and reactive to light.   Neck: Neck supple.   Normal range of motion.  Cardiovascular:  Normal rate and regular rhythm.           No murmur heard.  Pulmonary/Chest: He has no wheezes. He has no rhonchi.   Abdominal: Abdomen is soft. He exhibits no distension. There is no abdominal tenderness.   Musculoskeletal:         General: No tenderness or edema.      Cervical back: Normal range of motion and neck supple.     Lymphadenopathy:     He has no cervical adenopathy.   Neurological: He is alert and oriented to person, place, and time. No cranial nerve deficit or sensory deficit.   Skin: Skin is warm and dry. Capillary refill takes less than 2 seconds.   Psychiatric: He has a normal mood and affect. Thought content normal.         Medical Screening Exam   See Full Note    ED Course   Procedures  Labs Reviewed   COMPREHENSIVE METABOLIC PANEL - Abnormal; Notable for the following components:       Result Value    Chloride 108 (*)     Glucose 133 (*)      Globulin 4.2 (*)     Alk Phos 132 (*)     All other components within normal limits   D DIMER, QUANTITATIVE - Abnormal; Notable for the following components:    D-Dimer 0.52 (*)     All other components within normal limits   CBC WITH DIFFERENTIAL - Abnormal; Notable for the following components:    MCH 26.8 (*)     Neutrophils % 69.8 (*)     Lymphocytes % 22.7 (*)     Eosinophils % 0.9 (*)     All other components within normal limits   RAPID INFLUENZA A/B - Normal   TROPONIN I - Normal   NT-PRO NATRIURETIC PEPTIDE - Normal   SARS-COV-2 RNA AMPLIFICATION, QUAL - Normal    Narrative:     Negative SARS-CoV results should not be used as the sole basis for treatment or patient management decisions; negative results should be considered in the context of a patient's recent exposures, history and the presene of clinical signs and symptoms consistent with COVID-19.  Negative results should be treated as presumptive and confirmed by molecular assay, if necessary for patient management.   TROPONIN I - Normal   CBC W/ AUTO DIFFERENTIAL    Narrative:     The following orders were created for panel order CBC auto differential.  Procedure                               Abnormality         Status                     ---------                               -----------         ------                     CBC with Differential[[id number]]       Abnormal            Final result                 Please view results for these tests on the individual orders.          Imaging Results              X-Ray Chest PA And Lateral (Final result)  Result time 01/04/24 16:02:27      Final result by Clive Lozano MD (01/04/24 16:02:27)                   Impression:      No acute findings.      Electronically signed by: Clive Lozano  Date:    01/04/2024  Time:    16:02               Narrative:    EXAMINATION:  XR CHEST PA AND LATERAL    CLINICAL HISTORY:  Chest Pain;    TECHNIQUE:  PA and lateral views of the chest were  performed.    COMPARISON:  09/18/2023    FINDINGS:  Heart size normal.  Lungs clear.  No pneumothorax or pleural effusion.                                       Medications   aspirin tablet 325 mg (325 mg Oral Given 1/4/24 1802)     Medical Decision Making  43 year old male presents to ED with complaint of chest pain and pain with inspiration. Patient states pain started on last night with worsening on today. He reports on yesterday he did some work in his bathroom and was laying on his back reaching up with his left arm. Patient reports he was also sleeping in a recliner in his wife's hospital room on last night. He states pain initially started on last night to his left flank/side and on today pain has came across to his left chest. As he was driving his wife home from the hospital, the pain worsened to the point he was unable to take a deep breath and he had to pull over. He denies cough, fever, chills, nausea/vomiting    Labs, diagnostics obtained. PO ASA administered. Prescriptions provided    Amount and/or Complexity of Data Reviewed  Labs: ordered.     Details:     Result Value   Chloride 108 (*)   Glucose 133 (*)   Globulin 4.2 (*)   Alk Phos 132 (*)   All other components within normal limits  D DIMER, QUANTITATIVE - Abnormal; Notable for the following components:   D-Dimer 0.52 (*)   All other components within normal limits  CBC WITH DIFFERENTIAL - Abnormal; Notable for the following components:   MCH 26.8 (*)   Neutrophils % 69.8 (*)   Lymphocytes % 22.7 (*)   Eosinophils % 0.9 (*)    D-dimer lower than prior d-dimer of 0.81 with negative CT PE  Radiology: ordered.     Details: No acute findings.    ECG/medicine tests: ordered.     Details: NSR     Risk  OTC drugs.  Prescription drug management.                                      Clinical Impression:   Final diagnoses:  [R07.9] Chest pain  [R07.81] Pleuritic chest pain (Primary)        ED Disposition Condition    Discharge Stable          ED  Prescriptions       Medication Sig Dispense Start Date End Date Auth. Provider    ibuprofen (ADVIL,MOTRIN) 800 MG tablet Take 1 tablet (800 mg total) by mouth every 6 (six) hours as needed for Pain. 20 tablet 1/4/2024 -- Chasity Turner FNP    methylPREDNISolone (MEDROL DOSEPACK) 4 mg tablet Take as prescribed 1 each 1/4/2024 1/25/2024 Chasity Turner FNP          Follow-up Information    None          Chasity Turner FNP  01/04/24 1920

## 2024-01-05 ENCOUNTER — TELEPHONE (OUTPATIENT)
Dept: EMERGENCY MEDICINE | Facility: HOSPITAL | Age: 44
End: 2024-01-05
Payer: OTHER GOVERNMENT

## 2024-01-24 ENCOUNTER — OFFICE VISIT (OUTPATIENT)
Dept: FAMILY MEDICINE | Facility: CLINIC | Age: 44
End: 2024-01-24
Payer: OTHER GOVERNMENT

## 2024-01-24 VITALS
RESPIRATION RATE: 18 BRPM | OXYGEN SATURATION: 96 % | TEMPERATURE: 98 F | HEIGHT: 70 IN | SYSTOLIC BLOOD PRESSURE: 118 MMHG | BODY MASS INDEX: 45.1 KG/M2 | WEIGHT: 315 LBS | DIASTOLIC BLOOD PRESSURE: 80 MMHG | HEART RATE: 75 BPM

## 2024-01-24 DIAGNOSIS — M25.562 ACUTE PAIN OF LEFT KNEE: Primary | ICD-10-CM

## 2024-01-24 DIAGNOSIS — E66.01 CLASS 3 OBESITY: ICD-10-CM

## 2024-01-24 PROBLEM — E66.813 CLASS 3 OBESITY: Status: ACTIVE | Noted: 2024-01-24

## 2024-01-24 PROBLEM — I47.29 NSVT (NONSUSTAINED VENTRICULAR TACHYCARDIA): Status: RESOLVED | Noted: 2022-11-10 | Resolved: 2024-01-24

## 2024-01-24 PROCEDURE — 99212 OFFICE O/P EST SF 10 MIN: CPT | Mod: ,,, | Performed by: FAMILY MEDICINE

## 2024-01-24 NOTE — PROGRESS NOTES
Jigar Larson is a 43 y.o. male seen today for left-sided knee discomfort with a fusion to the medial aspect of the joint with a foreign body palpable on the anterior aspect of the joint since a fall a few days ago where his knee impacted the side of his pool.      Past Medical History:   Diagnosis Date    Anxiety disorder, unspecified     GERD (gastroesophageal reflux disease)     Palpitations     Rheumatoid arthritis     Sleep apnea      Family History   Problem Relation Age of Onset    Lung cancer Mother     Allergies Brother      Current Outpatient Medications on File Prior to Visit   Medication Sig Dispense Refill    cetirizine (ZYRTEC) 10 MG tablet Take 10 mg by mouth every evening.      clonazePAM (KLONOPIN) 0.5 MG tablet Take 0.5 mg by mouth nightly as needed.      etanercept (ENBREL) 50 mg/mL (1 mL) injection Inject 50 mg into the skin once a week.      gabapentin (NEURONTIN) 300 MG capsule Take 1 capsule (300 mg total) by mouth 2 (two) times daily. 180 capsule 1    ibuprofen (ADVIL,MOTRIN) 800 MG tablet Take 1 tablet (800 mg total) by mouth every 6 (six) hours as needed for Pain. 20 tablet 0    LORazepam (ATIVAN) 1 MG tablet Take 1 tablet (1 mg total) by mouth 1 (one) time if needed for Anxiety (30 min prior to MRI). 1 tablet 0    metoprolol succinate (TOPROL-XL) 50 MG 24 hr tablet Take 1 tablet (50 mg total) by mouth once daily. 90 tablet 3    mv-mn/iron/folic acid/herb 190 (VITAMIN D3 COMPLETE ORAL) Take by mouth once daily.      pantoprazole (PROTONIX) 40 MG tablet Take 40 mg by mouth once daily.      vilazodone (VIIBRYD) 20 mg Tab Take 20 mg by mouth once daily.      methylPREDNISolone (MEDROL DOSEPACK) 4 mg tablet Take as prescribed (Patient not taking: Reported on 1/24/2024) 1 each 0     No current facility-administered medications on file prior to visit.     Immunization History   Administered Date(s) Administered    COVID-19, MRNA, LN-S, PF (Pfizer) (Purple Cap) 04/09/2021, 04/30/2021    Influenza -  Quadrivalent - PF *Preferred* (6 months and older) 10/14/2022       Review of Systems   Constitutional:  Negative for fever, malaise/fatigue and weight loss.   Respiratory:  Negative for shortness of breath.    Cardiovascular:  Negative for chest pain and palpitations.   Gastrointestinal:  Negative for nausea and vomiting.   Musculoskeletal:  Positive for falls and joint pain.   Psychiatric/Behavioral:  Negative for depression.         Vitals:    01/24/24 1051   BP: 118/80   Pulse: 75   Resp: 18   Temp: 98.2 °F (36.8 °C)       Physical Exam  Vitals reviewed.   Eyes:      Conjunctiva/sclera: Conjunctivae normal.   Pulmonary:      Effort: Pulmonary effort is normal.   Musculoskeletal:      Left knee: Swelling present. No crepitus. Normal range of motion. No tenderness.        Legs:       Comments: Patient has a movable object in the  anterior aspect of the joint.  Patient has a mild effusion to the medial aspect of the joint.   Psychiatric:         Mood and Affect: Mood normal.         Behavior: Behavior normal.         Thought Content: Thought content normal.         Judgment: Judgment normal.          Assessment and Plan  1. Acute pain of left knee  -     X-Ray Knee 1 or 2 View Left; Future; Expected date: 01/24/2024    2. Class 3 obesity  Assessment & Plan:  We will continue to monitor his BMI and encouraged a healthy diet.               Return to clinic for his routine follow-up and as needed once his x-ray report is in.  I may consider an orthopedic evaluation or MRI of the knee.    Health Maintenance Topics with due status: Not Due       Topic Last Completion Date    Colorectal Cancer Screening 09/19/2023    Lipid Panel 10/02/2023    Hemoglobin A1c (Diabetic Prevention Screening) 10/03/2023

## 2024-02-09 ENCOUNTER — TELEPHONE (OUTPATIENT)
Dept: FAMILY MEDICINE | Facility: CLINIC | Age: 44
End: 2024-02-09
Payer: OTHER GOVERNMENT

## 2024-02-09 DIAGNOSIS — M25.562 ACUTE PAIN OF LEFT KNEE: Primary | ICD-10-CM

## 2024-02-09 NOTE — TELEPHONE ENCOUNTER
Notified Pt  His x-ray does not show any specific fractures but osteoarthritis.  This does not explain the foreign body and that Dr Carmichael recommends an orthopedic evaluation in the near future., Pt verbalized understanding and ortho referral was placed

## 2024-02-09 NOTE — TELEPHONE ENCOUNTER
----- Message from Rasta Carmichael MD sent at 1/24/2024  3:10 PM CST -----  His x-ray does not show any specific fractures but osteoarthritis.  This does not explain the foreign body and I recommend an orthopedic evaluation in the near future.

## 2024-02-20 ENCOUNTER — OFFICE VISIT (OUTPATIENT)
Dept: ORTHOPEDICS | Facility: CLINIC | Age: 44
End: 2024-02-20
Payer: OTHER GOVERNMENT

## 2024-02-20 DIAGNOSIS — M25.562 ACUTE PAIN OF LEFT KNEE: ICD-10-CM

## 2024-02-20 PROCEDURE — 99212 OFFICE O/P EST SF 10 MIN: CPT | Mod: PBBFAC | Performed by: NURSE PRACTITIONER

## 2024-02-20 PROCEDURE — 99203 OFFICE O/P NEW LOW 30 MIN: CPT | Mod: S$PBB,,, | Performed by: NURSE PRACTITIONER

## 2024-02-20 NOTE — PROGRESS NOTES
43 y.o. Male returns to clinic for a follow up visit regarding     ICD-10-CM ICD-9-CM   1. Acute pain of left knee  M25.562 719.46        Patient is here today for left knee pain.  Reports he bumped his knee on a swimming pool in January, was seen by his primary care provider.  He had a good bit of swelling over his kneecap at that time.  Reports he had fluid over his kneecap.  It has gotten better since this time.  Reports he is no longer having any pain.     Past Medical History:   Diagnosis Date    Anxiety disorder, unspecified     GERD (gastroesophageal reflux disease)     Palpitations     Rheumatoid arthritis     Sleep apnea      Past Surgical History:   Procedure Laterality Date    LEFT HEART CATHETERIZATION N/A 06/06/2023    Procedure: Left heart cath;  Surgeon: Fabio Lacy MD;  Location: [facility] CATH LAB;  Service: Cardiology;  Laterality: N/A;    LIPOMA RESECTION      LIPOMA RESECTION Left 10/16/2023    Procedure: EXCISION, LEFT BACK LIPOMA;  Surgeon: Jose M Meier DO;  Location: [facility] OR;  Service: General;  Laterality: Left;         PHYSICAL EXAMINATION:    General    Constitutional: He is oriented to person, place, and time. He appears well-developed and well-nourished.   HENT:   Head: Normocephalic and atraumatic.   Eyes: Pupils are equal, round, and reactive to light.   Cardiovascular:  Normal rate and intact distal pulses.            Pulmonary/Chest: Effort normal. No respiratory distress. He exhibits no tenderness.   Abdominal: Soft. There is no abdominal tenderness.   Neurological: He is alert and oriented to person, place, and time. He has normal reflexes.   Psychiatric: He has a normal mood and affect. His behavior is normal. Thought content normal.           Right Knee Exam     Range of Motion   Extension:  normal   Flexion:  normal     Tests   Ligament Examination   Lachman: normal (-1 to 2mm)     Left Knee Exam     Inspection   Erythema: absent  Scars: absent  Swelling:  absent  Effusion: absent  Deformity: absent  Bruising: absent    Range of Motion   Extension:  normal   Flexion:  normal     Tests   Meniscus   Garima:  Medial - negative Lateral - negative  Stability   Lachman: normal (-1 to 2mm)   PCL-Posterior Drawer: normal (0 to 2mm)  Patella   Patellar apprehension: negative  Patellar Tracking: normal  Q-Angle at 90 degrees: normal    Other   Meniscal Cyst: absent  Sensation: normal    Muscle Strength   Left Lower Extremity   Quadriceps:  5/5   Hamstrin/5     Vascular Exam       Left Pulses  Dorsalis Pedis:      2+  Posterior Tibial:      2+        IMAGING:  X-Ray Knee 1 or 2 View Left    Result Date: 2024  EXAMINATION: XR KNEE 1 OR 2 VIEW LEFT CLINICAL HISTORY: Pain in left knee TECHNIQUE: Left knee, AP and lateral views COMPARISON: None. FINDINGS: There is osteoarthritis centrally and medially and to a lesser degree involving the patellofemoral joint.  No effusion or fracture is seen.     There is osteoarthritis but no acute fractures are seen. Place of service: [facility] Electronically signed by: Sommer Saldaña Date:    2024 Time:    14:57       ASSESSMENT:      ICD-10-CM ICD-9-CM   1. Acute pain of left knee  M25.562 719.46       PLAN:     -Findings and treatment options were discussed with the patient  -All questions answered      Patient likely had prepatellar bursitis.  His symptoms have improved at this time.  Patient instructed to call if symptoms return or worsen.    There are no Patient Instructions on file for this visit.      No orders of the defined types were placed in this encounter.        Procedures

## 2024-04-10 ENCOUNTER — TELEPHONE (OUTPATIENT)
Dept: FAMILY MEDICINE | Facility: CLINIC | Age: 44
End: 2024-04-10
Payer: OTHER GOVERNMENT

## 2024-04-10 DIAGNOSIS — R74.8 ELEVATED LIVER ENZYMES: Primary | ICD-10-CM

## 2024-04-10 NOTE — TELEPHONE ENCOUNTER
----- Message from Rasta Carmichael MD sent at 4/9/2024  7:55 AM CDT -----  Patient remains prediabetic with an A1c of a 5.9.  Recheck A1c in 3 months.  Have the patient come in for fractionated alkaline phosphatase.

## 2024-04-10 NOTE — TELEPHONE ENCOUNTER
Pt notified that his A1c is 5.9 leaving him still in the prediabetic range, notified that alk phos was elevated and Dr Carmichael recommended a fract alk phos and to recheck A1c in 3 months. He voiced understanding. Fract alk order placed for standing.

## 2024-04-15 ENCOUNTER — LAB VISIT (OUTPATIENT)
Dept: LAB | Facility: CLINIC | Age: 44
End: 2024-04-15
Payer: OTHER GOVERNMENT

## 2024-04-15 DIAGNOSIS — R74.8 ELEVATED LIVER ENZYMES: ICD-10-CM

## 2024-04-15 PROCEDURE — 84075 ASSAY ALKALINE PHOSPHATASE: CPT | Mod: 90,,, | Performed by: CLINICAL MEDICAL LABORATORY

## 2024-04-15 PROCEDURE — 84080 ASSAY ALKALINE PHOSPHATASES: CPT | Mod: 90,,, | Performed by: CLINICAL MEDICAL LABORATORY

## 2024-04-18 LAB
ALP BONE CFR SERPL: 20.3 % (ref 19.1–67.7)
ALP BONE SERPL-CCNC: 21.1 IU/L (ref 12.1–42.7)
ALP INTEST CFR SERPL: 3.3 % (ref 0–20.6)
ALP INTEST SERPL-CCNC: 3.4 IU/L (ref 0–11)
ALP LIVER 1 CFR SERPL: 74.2 % (ref 27.8–76.3)
ALP LIVER 1 SERPL-CCNC: 77.2 IU/L (ref 16.2–70.2)
ALP LIVER 2 CFR SERPL: 2.2 % (ref 0–8)
ALP LIVER 2 SERPL-CCNC: 2.3 IU/L (ref 0–5.8)
ALP PLAC SERPL QL: ABNORMAL
ALP SERPL-CCNC: 104 U/L (ref 40–129)

## 2024-04-24 ENCOUNTER — TELEPHONE (OUTPATIENT)
Dept: FAMILY MEDICINE | Facility: CLINIC | Age: 44
End: 2024-04-24
Payer: OTHER GOVERNMENT

## 2024-04-24 NOTE — TELEPHONE ENCOUNTER
Notified patient that Patient he  has a very mild elevation of the liver sub fraction likely related to his medications and Dr Carmichael will continue to monitor his liver functions , patient verbalized understanding.

## 2024-04-24 NOTE — TELEPHONE ENCOUNTER
----- Message from Rasta Carmichael MD sent at 4/18/2024  2:58 PM CDT -----  Patient has a very mild elevation of the liver sub fraction likely related to his medications and will continue to monitor his liver functions

## 2024-07-01 ENCOUNTER — OFFICE VISIT (OUTPATIENT)
Dept: FAMILY MEDICINE | Facility: CLINIC | Age: 44
End: 2024-07-01
Payer: OTHER GOVERNMENT

## 2024-07-01 VITALS
WEIGHT: 300 LBS | TEMPERATURE: 98 F | DIASTOLIC BLOOD PRESSURE: 62 MMHG | RESPIRATION RATE: 18 BRPM | BODY MASS INDEX: 40.63 KG/M2 | HEART RATE: 64 BPM | OXYGEN SATURATION: 97 % | HEIGHT: 72 IN | SYSTOLIC BLOOD PRESSURE: 122 MMHG

## 2024-07-01 DIAGNOSIS — F41.9 ANXIETY: ICD-10-CM

## 2024-07-01 DIAGNOSIS — R00.0 TACHYCARDIA: Primary | ICD-10-CM

## 2024-07-01 LAB
ALBUMIN SERPL BCP-MCNC: 3.8 G/DL (ref 3.5–5)
ALBUMIN/GLOB SERPL: 1.1 {RATIO}
ALP SERPL-CCNC: 120 U/L (ref 45–115)
ALT SERPL W P-5'-P-CCNC: 46 U/L (ref 16–61)
ANION GAP SERPL CALCULATED.3IONS-SCNC: 10 MMOL/L (ref 7–16)
AST SERPL W P-5'-P-CCNC: 24 U/L (ref 15–37)
BASOPHILS # BLD AUTO: 0.03 K/UL (ref 0–0.2)
BASOPHILS NFR BLD AUTO: 0.3 % (ref 0–1)
BILIRUB SERPL-MCNC: 0.5 MG/DL (ref ?–1.2)
BUN SERPL-MCNC: 16 MG/DL (ref 7–18)
BUN/CREAT SERPL: 15 (ref 6–20)
CALCIUM SERPL-MCNC: 9 MG/DL (ref 8.5–10.1)
CHLORIDE SERPL-SCNC: 110 MMOL/L (ref 98–107)
CO2 SERPL-SCNC: 26 MMOL/L (ref 21–32)
CREAT SERPL-MCNC: 1.06 MG/DL (ref 0.7–1.3)
DIFFERENTIAL METHOD BLD: ABNORMAL
EGFR (NO RACE VARIABLE) (RUSH/TITUS): 88 ML/MIN/1.73M2
EOSINOPHIL # BLD AUTO: 0.12 K/UL (ref 0–0.5)
EOSINOPHIL NFR BLD AUTO: 1.2 % (ref 1–4)
ERYTHROCYTE [DISTWIDTH] IN BLOOD BY AUTOMATED COUNT: 13.3 % (ref 11.5–14.5)
GLOBULIN SER-MCNC: 3.4 G/DL (ref 2–4)
GLUCOSE SERPL-MCNC: 98 MG/DL (ref 74–106)
HCT VFR BLD AUTO: 44.2 % (ref 40–54)
HGB BLD-MCNC: 14 G/DL (ref 13.5–18)
IMM GRANULOCYTES # BLD AUTO: 0.04 K/UL (ref 0–0.04)
IMM GRANULOCYTES NFR BLD: 0.4 % (ref 0–0.4)
LYMPHOCYTES # BLD AUTO: 2.74 K/UL (ref 1–4.8)
LYMPHOCYTES NFR BLD AUTO: 27.8 % (ref 27–41)
MCH RBC QN AUTO: 26.6 PG (ref 27–31)
MCHC RBC AUTO-ENTMCNC: 31.7 G/DL (ref 32–36)
MCV RBC AUTO: 83.9 FL (ref 80–96)
MONOCYTES # BLD AUTO: 0.62 K/UL (ref 0–0.8)
MONOCYTES NFR BLD AUTO: 6.3 % (ref 2–6)
MPC BLD CALC-MCNC: 10.3 FL (ref 9.4–12.4)
NEUTROPHILS # BLD AUTO: 6.32 K/UL (ref 1.8–7.7)
NEUTROPHILS NFR BLD AUTO: 64 % (ref 53–65)
NRBC # BLD AUTO: 0 X10E3/UL
NRBC, AUTO (.00): 0 %
PLATELET # BLD AUTO: 290 K/UL (ref 150–400)
POTASSIUM SERPL-SCNC: 4.4 MMOL/L (ref 3.5–5.1)
PROT SERPL-MCNC: 7.2 G/DL (ref 6.4–8.2)
RBC # BLD AUTO: 5.27 M/UL (ref 4.6–6.2)
SODIUM SERPL-SCNC: 142 MMOL/L (ref 136–145)
TROPONIN I SERPL DL<=0.01 NG/ML-MCNC: 5.3 PG/ML
TSH SERPL DL<=0.005 MIU/L-ACNC: 0.67 UIU/ML (ref 0.36–3.74)
WBC # BLD AUTO: 9.87 K/UL (ref 4.5–11)

## 2024-07-01 PROCEDURE — 84484 ASSAY OF TROPONIN QUANT: CPT | Mod: ,,, | Performed by: CLINICAL MEDICAL LABORATORY

## 2024-07-01 PROCEDURE — 80050 GENERAL HEALTH PANEL: CPT | Mod: ,,, | Performed by: CLINICAL MEDICAL LABORATORY

## 2024-07-01 RX ORDER — TRAZODONE HYDROCHLORIDE 50 MG/1
50 TABLET ORAL NIGHTLY
COMMUNITY
Start: 2024-06-07

## 2024-07-01 RX ORDER — HYDROXYZINE PAMOATE 25 MG/1
25 CAPSULE ORAL
COMMUNITY
Start: 2024-05-01

## 2024-07-01 NOTE — PROGRESS NOTES
Subjective:       Patient ID: Jigar Larson is a 44 y.o. male.    Chief Complaint: Anxiety (Pt. STATES HEART RACING WANT TO  GET EKG.)    Pt with history of anxiety. Reports heart flutter for a few seconds last night, then became anxious and felt more fluttering, none today but nervous about this. No chest pain.     Anxiety  Patient reports no chest pain, confusion, decreased concentration, dizziness, dysphagia, nausea, nervous/anxious behavior, palpitations, shortness of breath or suicidal ideas.         Review of Systems   Constitutional:  Negative for activity change, appetite change, chills, diaphoresis, fatigue, fever and unexpected weight change.   HENT:  Negative for nasal congestion, dental problem, drooling, ear discharge, ear pain, facial swelling, hearing loss, mouth sores, nosebleeds, postnasal drip, rhinorrhea, sinus pressure/congestion, sneezing, sore throat, tinnitus, trouble swallowing, voice change and goiter.    Eyes:  Negative for photophobia, pain, discharge, redness, itching and visual disturbance.   Respiratory:  Negative for apnea, cough, choking, chest tightness, shortness of breath, wheezing and stridor.    Cardiovascular:  Negative for chest pain, palpitations, leg swelling and claudication.   Gastrointestinal:  Negative for abdominal distention, abdominal pain, anal bleeding, blood in stool, change in bowel habit, constipation, diarrhea, nausea, vomiting, reflux and fecal incontinence.   Endocrine: Negative for cold intolerance, heat intolerance, polydipsia, polyphagia and polyuria.   Genitourinary:  Negative for bladder incontinence, decreased urine volume, difficulty urinating, discharge, dysuria, enuresis, erectile dysfunction, flank pain, frequency, genital sores, hematuria, penile pain, testicular pain and urgency.   Musculoskeletal:  Negative for arthralgias, back pain, gait problem, joint swelling, leg pain, myalgias, neck pain, neck stiffness and joint deformity.   Integumentary:   Negative for pallor, rash, wound and mole/lesion.   Allergic/Immunologic: Negative for environmental allergies, food allergies and frequent infections.   Neurological:  Negative for dizziness, vertigo, tremors, seizures, syncope, facial asymmetry, speech difficulty, weakness, light-headedness, numbness, headaches, memory loss and coordination difficulties.   Hematological:  Negative for adenopathy. Does not bruise/bleed easily.   Psychiatric/Behavioral:  Negative for agitation, behavioral problems, confusion, decreased concentration, dysphoric mood, hallucinations, self-injury, sleep disturbance and suicidal ideas. The patient is not nervous/anxious and is not hyperactive.          Objective:      Physical Exam  Vitals reviewed.   Constitutional:       Appearance: Normal appearance. He is normal weight.   HENT:      Head: Normocephalic and atraumatic.      Right Ear: Tympanic membrane and ear canal normal.      Left Ear: Tympanic membrane, ear canal and external ear normal.      Nose: Nose normal.      Mouth/Throat:      Mouth: Mucous membranes are moist.      Pharynx: Oropharynx is clear.   Eyes:      Extraocular Movements: Extraocular movements intact.      Conjunctiva/sclera: Conjunctivae normal.      Pupils: Pupils are equal, round, and reactive to light.   Cardiovascular:      Rate and Rhythm: Normal rate and regular rhythm.      Pulses: Normal pulses.      Heart sounds: Normal heart sounds.   Pulmonary:      Effort: Pulmonary effort is normal.      Breath sounds: Normal breath sounds.   Abdominal:      General: Abdomen is flat. Bowel sounds are normal.      Palpations: Abdomen is soft.   Musculoskeletal:         General: Normal range of motion.      Cervical back: Normal range of motion and neck supple.   Skin:     General: Skin is warm and dry.   Neurological:      General: No focal deficit present.      Mental Status: He is alert and oriented to person, place, and time. Mental status is at baseline.    Psychiatric:         Mood and Affect: Mood normal.         Behavior: Behavior normal.         Thought Content: Thought content normal.         Judgment: Judgment normal.         Assessment:       1. Tachycardia    2. Anxiety        Plan:     Tachycardia  -     EKG 12-lead; Future  -     Cancel: Troponin I; Future; Expected date: 07/01/2024  -     Troponin I; Future; Expected date: 07/01/2024  -     Comprehensive Metabolic Panel; Future; Expected date: 07/01/2024  -     TSH; Future; Expected date: 07/01/2024  -     CBC Auto Differential; Future; Expected date: 07/01/2024    Anxiety

## 2024-07-28 ENCOUNTER — HOSPITAL ENCOUNTER (EMERGENCY)
Facility: HOSPITAL | Age: 44
Discharge: ANOTHER HOSPITAL | End: 2024-07-28
Attending: EMERGENCY MEDICINE
Payer: OTHER GOVERNMENT

## 2024-07-28 VITALS
BODY MASS INDEX: 41.85 KG/M2 | HEIGHT: 72 IN | WEIGHT: 309 LBS | TEMPERATURE: 98 F | HEART RATE: 91 BPM | RESPIRATION RATE: 14 BRPM | DIASTOLIC BLOOD PRESSURE: 67 MMHG | SYSTOLIC BLOOD PRESSURE: 122 MMHG | OXYGEN SATURATION: 98 %

## 2024-07-28 DIAGNOSIS — R29.818 ACUTE FOCAL NEUROLOGICAL DEFICIT: ICD-10-CM

## 2024-07-28 DIAGNOSIS — I63.312 STROKE DUE TO THROMBOSIS OF LEFT MIDDLE CEREBRAL ARTERY: Primary | ICD-10-CM

## 2024-07-28 DIAGNOSIS — I63.9 CEREBROVASCULAR ACCIDENT (CVA), UNSPECIFIED MECHANISM: ICD-10-CM

## 2024-07-28 LAB
ALBUMIN SERPL BCP-MCNC: 3.6 G/DL (ref 3.5–5)
ALBUMIN/GLOB SERPL: 1.1 {RATIO}
ALP SERPL-CCNC: 114 U/L (ref 45–115)
ALT SERPL W P-5'-P-CCNC: 44 U/L (ref 16–61)
ANION GAP SERPL CALCULATED.3IONS-SCNC: 10 MMOL/L (ref 7–16)
AST SERPL W P-5'-P-CCNC: 21 U/L (ref 15–37)
BASOPHILS # BLD AUTO: 0.02 K/UL (ref 0–0.2)
BASOPHILS NFR BLD AUTO: 0.3 % (ref 0–1)
BILIRUB SERPL-MCNC: 0.5 MG/DL (ref ?–1.2)
BUN SERPL-MCNC: 15 MG/DL (ref 7–18)
BUN/CREAT SERPL: 14 (ref 6–20)
CALCIUM SERPL-MCNC: 8 MG/DL (ref 8.5–10.1)
CHLORIDE SERPL-SCNC: 108 MMOL/L (ref 98–107)
CHOLEST SERPL-MCNC: 140 MG/DL (ref 0–200)
CHOLEST/HDLC SERPL: 3.2 {RATIO}
CO2 SERPL-SCNC: 24 MMOL/L (ref 21–32)
CREAT SERPL-MCNC: 1.04 MG/DL (ref 0.7–1.3)
DIFFERENTIAL METHOD BLD: ABNORMAL
EGFR (NO RACE VARIABLE) (RUSH/TITUS): 90 ML/MIN/1.73M2
EOSINOPHIL # BLD AUTO: 0.06 K/UL (ref 0–0.5)
EOSINOPHIL NFR BLD AUTO: 0.9 % (ref 1–4)
ERYTHROCYTE [DISTWIDTH] IN BLOOD BY AUTOMATED COUNT: 13.3 % (ref 11.5–14.5)
GLOBULIN SER-MCNC: 3.4 G/DL (ref 2–4)
GLUCOSE SERPL-MCNC: 119 MG/DL (ref 74–106)
HCT VFR BLD AUTO: 41 % (ref 40–54)
HDLC SERPL-MCNC: 44 MG/DL (ref 40–60)
HGB BLD-MCNC: 13.5 G/DL (ref 13.5–18)
IMM GRANULOCYTES # BLD AUTO: 0.02 K/UL (ref 0–0.04)
IMM GRANULOCYTES NFR BLD: 0.3 % (ref 0–0.4)
INR BLD: 0.99
LDLC SERPL CALC-MCNC: 84 MG/DL
LDLC/HDLC SERPL: 1.9 {RATIO}
LYMPHOCYTES # BLD AUTO: 1.45 K/UL (ref 1–4.8)
LYMPHOCYTES NFR BLD AUTO: 20.6 % (ref 27–41)
MCH RBC QN AUTO: 26.9 PG (ref 27–31)
MCHC RBC AUTO-ENTMCNC: 32.9 G/DL (ref 32–36)
MCV RBC AUTO: 81.7 FL (ref 80–96)
MONOCYTES # BLD AUTO: 0.38 K/UL (ref 0–0.8)
MONOCYTES NFR BLD AUTO: 5.4 % (ref 2–6)
MPC BLD CALC-MCNC: 10.1 FL (ref 9.4–12.4)
NEUTROPHILS # BLD AUTO: 5.1 K/UL (ref 1.8–7.7)
NEUTROPHILS NFR BLD AUTO: 72.5 % (ref 53–65)
NONHDLC SERPL-MCNC: 96 MG/DL
NRBC # BLD AUTO: 0 X10E3/UL
NRBC, AUTO (.00): 0 %
PLATELET # BLD AUTO: 250 K/UL (ref 150–400)
POTASSIUM SERPL-SCNC: 3.5 MMOL/L (ref 3.5–5.1)
PROT SERPL-MCNC: 7 G/DL (ref 6.4–8.2)
PROTHROMBIN TIME: 13 SECONDS (ref 11.7–14.7)
RBC # BLD AUTO: 5.02 M/UL (ref 4.6–6.2)
SODIUM SERPL-SCNC: 138 MMOL/L (ref 136–145)
TRIGL SERPL-MCNC: 61 MG/DL (ref 35–150)
TSH SERPL DL<=0.005 MIU/L-ACNC: 0.66 UIU/ML (ref 0.36–3.74)
VLDLC SERPL-MCNC: 12 MG/DL
WBC # BLD AUTO: 7.03 K/UL (ref 4.5–11)

## 2024-07-28 PROCEDURE — 99285 EMERGENCY DEPT VISIT HI MDM: CPT | Mod: 25

## 2024-07-28 PROCEDURE — 93005 ELECTROCARDIOGRAM TRACING: CPT

## 2024-07-28 PROCEDURE — 80053 COMPREHEN METABOLIC PANEL: CPT | Performed by: EMERGENCY MEDICINE

## 2024-07-28 PROCEDURE — 93010 ELECTROCARDIOGRAM REPORT: CPT | Mod: ,,, | Performed by: INTERNAL MEDICINE

## 2024-07-28 PROCEDURE — G0508 CRIT CARE TELEHEA CONSULT 60: HCPCS | Mod: GT,,, | Performed by: STUDENT IN AN ORGANIZED HEALTH CARE EDUCATION/TRAINING PROGRAM

## 2024-07-28 PROCEDURE — 25000003 PHARM REV CODE 250: Performed by: EMERGENCY MEDICINE

## 2024-07-28 PROCEDURE — 36415 COLL VENOUS BLD VENIPUNCTURE: CPT | Performed by: EMERGENCY MEDICINE

## 2024-07-28 PROCEDURE — 25500020 PHARM REV CODE 255: Performed by: EMERGENCY MEDICINE

## 2024-07-28 PROCEDURE — 85025 COMPLETE CBC W/AUTO DIFF WBC: CPT | Performed by: EMERGENCY MEDICINE

## 2024-07-28 PROCEDURE — 80061 LIPID PANEL: CPT | Performed by: EMERGENCY MEDICINE

## 2024-07-28 PROCEDURE — 85610 PROTHROMBIN TIME: CPT | Performed by: EMERGENCY MEDICINE

## 2024-07-28 PROCEDURE — 84443 ASSAY THYROID STIM HORMONE: CPT | Performed by: EMERGENCY MEDICINE

## 2024-07-28 RX ORDER — IOPAMIDOL 755 MG/ML
100 INJECTION, SOLUTION INTRAVASCULAR
Status: COMPLETED | OUTPATIENT
Start: 2024-07-28 | End: 2024-07-28

## 2024-07-28 RX ORDER — ASPIRIN 325 MG
325 TABLET ORAL
Status: COMPLETED | OUTPATIENT
Start: 2024-07-28 | End: 2024-07-28

## 2024-07-28 RX ORDER — CLOPIDOGREL BISULFATE 300 MG/1
300 TABLET, FILM COATED ORAL
Status: COMPLETED | OUTPATIENT
Start: 2024-07-28 | End: 2024-07-28

## 2024-07-28 RX ADMIN — ASPIRIN 325 MG ORAL TABLET 325 MG: 325 PILL ORAL at 11:07

## 2024-07-28 RX ADMIN — IOPAMIDOL 100 ML: 755 INJECTION, SOLUTION INTRAVENOUS at 11:07

## 2024-07-28 RX ADMIN — CLOPIDOGREL BISULFATE 300 MG: 300 TABLET, FILM COATED ORAL at 11:07

## 2024-07-29 LAB
OHS QRS DURATION: 104 MS
OHS QTC CALCULATION: 414 MS

## 2024-09-26 ENCOUNTER — HOSPITAL ENCOUNTER (EMERGENCY)
Facility: HOSPITAL | Age: 44
Discharge: HOME | End: 2024-09-26
Attending: FAMILY MEDICINE
Payer: OTHER GOVERNMENT

## 2024-09-26 VITALS
TEMPERATURE: 98 F | WEIGHT: 270 LBS | RESPIRATION RATE: 26 BRPM | HEIGHT: 72 IN | DIASTOLIC BLOOD PRESSURE: 63 MMHG | OXYGEN SATURATION: 96 % | BODY MASS INDEX: 36.57 KG/M2 | SYSTOLIC BLOOD PRESSURE: 111 MMHG | HEART RATE: 84 BPM

## 2024-09-26 DIAGNOSIS — R29.818 ACUTE FOCAL NEUROLOGICAL DEFICIT: ICD-10-CM

## 2024-09-26 DIAGNOSIS — G93.6 CEREBRAL EDEMA: Primary | ICD-10-CM

## 2024-09-26 LAB
ALBUMIN SERPL BCP-MCNC: 2.9 G/DL (ref 3.5–5)
ALBUMIN/GLOB SERPL: 0.8 {RATIO}
ALP SERPL-CCNC: 132 U/L (ref 45–115)
ALT SERPL W P-5'-P-CCNC: 47 U/L (ref 16–61)
ANION GAP SERPL CALCULATED.3IONS-SCNC: 7 MMOL/L (ref 7–16)
AST SERPL W P-5'-P-CCNC: 18 U/L (ref 15–37)
BASOPHILS # BLD AUTO: 0.03 K/UL (ref 0–0.2)
BASOPHILS NFR BLD AUTO: 0.4 % (ref 0–1)
BILIRUB SERPL-MCNC: 0.5 MG/DL (ref ?–1.2)
BUN SERPL-MCNC: 15 MG/DL (ref 7–18)
BUN/CREAT SERPL: 17 (ref 6–20)
CALCIUM SERPL-MCNC: 8.8 MG/DL (ref 8.5–10.1)
CHLORIDE SERPL-SCNC: 104 MMOL/L (ref 98–107)
CHOLEST SERPL-MCNC: 198 MG/DL (ref 0–200)
CHOLEST/HDLC SERPL: 4 {RATIO}
CO2 SERPL-SCNC: 29 MMOL/L (ref 21–32)
CREAT SERPL-MCNC: 0.87 MG/DL (ref 0.7–1.3)
DIFFERENTIAL METHOD BLD: ABNORMAL
EGFR (NO RACE VARIABLE) (RUSH/TITUS): 108 ML/MIN/1.73M2
EOSINOPHIL # BLD AUTO: 0.06 K/UL (ref 0–0.5)
EOSINOPHIL NFR BLD AUTO: 0.8 % (ref 1–4)
ERYTHROCYTE [DISTWIDTH] IN BLOOD BY AUTOMATED COUNT: 14.6 % (ref 11.5–14.5)
GLOBULIN SER-MCNC: 3.7 G/DL (ref 2–4)
GLUCOSE SERPL-MCNC: 105 MG/DL (ref 74–106)
GLUCOSE SERPL-MCNC: 89 MG/DL (ref 70–105)
HCT VFR BLD AUTO: 38.7 % (ref 40–54)
HDLC SERPL-MCNC: 49 MG/DL (ref 40–60)
HGB BLD-MCNC: 12.1 G/DL (ref 13.5–18)
IMM GRANULOCYTES # BLD AUTO: 0.05 K/UL (ref 0–0.04)
IMM GRANULOCYTES NFR BLD: 0.6 % (ref 0–0.4)
INR BLD: 1.22
LDLC SERPL CALC-MCNC: 128 MG/DL
LDLC/HDLC SERPL: 2.6 {RATIO}
LYMPHOCYTES # BLD AUTO: 1.36 K/UL (ref 1–4.8)
LYMPHOCYTES NFR BLD AUTO: 17.6 % (ref 27–41)
MCH RBC QN AUTO: 26.7 PG (ref 27–31)
MCHC RBC AUTO-ENTMCNC: 31.3 G/DL (ref 32–36)
MCV RBC AUTO: 85.2 FL (ref 80–96)
MONOCYTES # BLD AUTO: 0.62 K/UL (ref 0–0.8)
MONOCYTES NFR BLD AUTO: 8 % (ref 2–6)
MPC BLD CALC-MCNC: 9.9 FL (ref 9.4–12.4)
NEUTROPHILS # BLD AUTO: 5.61 K/UL (ref 1.8–7.7)
NEUTROPHILS NFR BLD AUTO: 72.6 % (ref 53–65)
NONHDLC SERPL-MCNC: 149 MG/DL
NRBC # BLD AUTO: 0 X10E3/UL
NRBC, AUTO (.00): 0 %
PLATELET # BLD AUTO: 173 K/UL (ref 150–400)
POTASSIUM SERPL-SCNC: 4 MMOL/L (ref 3.5–5.1)
PROT SERPL-MCNC: 6.6 G/DL (ref 6.4–8.2)
PROTHROMBIN TIME: 15.3 SECONDS (ref 11.7–14.7)
RBC # BLD AUTO: 4.54 M/UL (ref 4.6–6.2)
SODIUM SERPL-SCNC: 136 MMOL/L (ref 136–145)
TRIGL SERPL-MCNC: 104 MG/DL (ref 35–150)
TSH SERPL DL<=0.005 MIU/L-ACNC: 0.79 UIU/ML (ref 0.36–3.74)
VLDLC SERPL-MCNC: 21 MG/DL
WBC # BLD AUTO: 7.73 K/UL (ref 4.5–11)

## 2024-09-26 PROCEDURE — 36415 COLL VENOUS BLD VENIPUNCTURE: CPT | Performed by: FAMILY MEDICINE

## 2024-09-26 PROCEDURE — 84443 ASSAY THYROID STIM HORMONE: CPT | Performed by: FAMILY MEDICINE

## 2024-09-26 PROCEDURE — 93010 ELECTROCARDIOGRAM REPORT: CPT | Mod: ,,, | Performed by: INTERNAL MEDICINE

## 2024-09-26 PROCEDURE — 85025 COMPLETE CBC W/AUTO DIFF WBC: CPT | Performed by: FAMILY MEDICINE

## 2024-09-26 PROCEDURE — 63600175 PHARM REV CODE 636 W HCPCS: Performed by: FAMILY MEDICINE

## 2024-09-26 PROCEDURE — 80061 LIPID PANEL: CPT | Performed by: FAMILY MEDICINE

## 2024-09-26 PROCEDURE — 99448 NTRPROF PH1/NTRNET/EHR 21-30: CPT | Mod: ,,, | Performed by: PSYCHIATRY & NEUROLOGY

## 2024-09-26 PROCEDURE — 96372 THER/PROPH/DIAG INJ SC/IM: CPT | Performed by: FAMILY MEDICINE

## 2024-09-26 PROCEDURE — 93005 ELECTROCARDIOGRAM TRACING: CPT

## 2024-09-26 PROCEDURE — 80053 COMPREHEN METABOLIC PANEL: CPT | Performed by: FAMILY MEDICINE

## 2024-09-26 PROCEDURE — 82962 GLUCOSE BLOOD TEST: CPT

## 2024-09-26 PROCEDURE — 99285 EMERGENCY DEPT VISIT HI MDM: CPT | Mod: 25

## 2024-09-26 PROCEDURE — 85610 PROTHROMBIN TIME: CPT | Performed by: FAMILY MEDICINE

## 2024-09-26 RX ORDER — ONDANSETRON HYDROCHLORIDE 8 MG/1
8 TABLET, FILM COATED ORAL EVERY 12 HOURS PRN
COMMUNITY
Start: 2024-09-16

## 2024-09-26 RX ORDER — DAPSONE 100 MG/1
100 TABLET ORAL DAILY
COMMUNITY
Start: 2024-09-16 | End: 2024-10-28

## 2024-09-26 RX ORDER — TEMOZOLOMIDE 180 MG/1
180 CAPSULE ORAL DAILY
COMMUNITY
Start: 2024-09-16 | End: 2024-10-28

## 2024-09-26 RX ORDER — DEXAMETHASONE 4 MG/1
4 TABLET ORAL 3 TIMES DAILY
Qty: 40 TABLET | Refills: 0 | Status: SHIPPED | OUTPATIENT
Start: 2024-09-26 | End: 2024-11-05

## 2024-09-26 RX ORDER — TEMOZOLOMIDE 5 MG/1
5 CAPSULE ORAL
COMMUNITY
Start: 2024-09-16 | End: 2024-10-28

## 2024-09-26 RX ORDER — DEXAMETHASONE SODIUM PHOSPHATE 4 MG/ML
10 INJECTION, SOLUTION INTRA-ARTICULAR; INTRALESIONAL; INTRAMUSCULAR; INTRAVENOUS; SOFT TISSUE
Status: COMPLETED | OUTPATIENT
Start: 2024-09-26 | End: 2024-09-26

## 2024-09-26 RX ORDER — PANTOPRAZOLE SODIUM 40 MG/1
1 TABLET, DELAYED RELEASE ORAL DAILY
COMMUNITY

## 2024-09-26 RX ADMIN — DEXAMETHASONE SODIUM PHOSPHATE 10 MG: 4 INJECTION, SOLUTION INTRA-ARTICULAR; INTRALESIONAL; INTRAMUSCULAR; INTRAVENOUS; SOFT TISSUE at 02:09

## 2024-09-26 NOTE — ED PROVIDER NOTES
Encounter Date: 9/26/2024       History     Chief Complaint   Patient presents with    Extremity Weakness     Presents via [facility] complaining of left sided weakness that onset this morning. Wife states that patient has a hx of CVA and PE as of the last 2 months, CVA residuals include R sided weakness. Patient seems mildly confused on triage.      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  Patient is left with residual right-sided weakness.  Comes in again today with complaints of left-sided weakness--August 14th patient went to [facility]--he also was recently treated for a saddle block PE and was treated with a thrombectomy--on September 8th he was discharged home with [facility]---as per this morning the wife says that with the left arm he could not even hold a cup of coffee he was having excruciating pain in the left arm and left leg--        Review of patient's allergies indicates:  No Known Allergies  Past Medical History:   Diagnosis Date    Anxiety disorder, unspecified     GERD (gastroesophageal reflux disease)     Palpitations     Rheumatoid arthritis     Sleep apnea      Past Surgical History:   Procedure Laterality Date    LEFT HEART CATHETERIZATION N/A 06/06/2023    Procedure: Left heart cath;  Surgeon: Fabio Lacy MD;  Location: [facility] CATH LAB;  Service: Cardiology;  Laterality: N/A;    LIPOMA RESECTION      LIPOMA RESECTION Left 10/16/2023    Procedure: EXCISION, LEFT BACK LIPOMA;  Surgeon: Jose M Meier DO;  Location: [facility] OR;  Service: General;  Laterality: Left;     Family History   Problem Relation Name Age of Onset    Lung cancer Mother      Allergies Brother       Social History     Tobacco Use    Smoking status: Never     Passive exposure: Never    Smokeless tobacco: Never   Substance Use Topics    Alcohol use: Never    Drug use: Never     Review  of Systems   Constitutional:  Negative for fever.   HENT:  Negative for sore throat.    Respiratory:  Negative for shortness of breath.    Cardiovascular:  Negative for chest pain.   Gastrointestinal:  Negative for nausea.   Genitourinary:  Negative for dysuria.   Musculoskeletal:  Negative for back pain.   Skin:  Negative for rash.   Neurological:  Negative for weakness.   Hematological:  Does not bruise/bleed easily.       Physical Exam     Initial Vitals   BP Pulse Resp Temp SpO2   09/26/24 1051 09/26/24 1043 09/26/24 1043 09/26/24 1043 09/26/24 1043   108/65 89 11 98.2 °F (36.8 °C) 97 %      MAP       --                Physical Exam    Constitutional: He appears well-developed and well-nourished.   HENT:   Head: Normocephalic and atraumatic.   Right Ear: External ear normal.   Left Ear: External ear normal.   Nose: Nose normal.   Mouth/Throat: Oropharynx is clear and moist.   Eyes: Conjunctivae and EOM are normal. Pupils are equal, round, and reactive to light.   Neck: Neck supple.   Normal range of motion.  Cardiovascular:  Normal rate, regular rhythm, normal heart sounds and intact distal pulses.           Pulmonary/Chest: Breath sounds normal.   Abdominal: Abdomen is soft. Bowel sounds are normal.   Genitourinary:    Prostate and penis normal.     Musculoskeletal:         General: Normal range of motion.      Cervical back: Normal range of motion and neck supple.      Comments: Right-sided paralysis patient with full function of the left.  No slurred speech no altered mental status.  No expressive aphasia.  No ataxic moves to the left side.  His symptoms have completely resolved.     Neurological: He is alert and oriented to person, place, and time. He has normal strength and normal reflexes.   Skin: Skin is warm and dry.   Psychiatric: He has a normal mood and affect. His behavior is normal. Judgment and thought content normal.         Medical Screening Exam   See Full Note    ED Course   Procedures  Labs  Reviewed   COMPREHENSIVE METABOLIC PANEL - Abnormal       Result Value    Sodium 136      Potassium 4.0      Chloride 104      CO2 29      Anion Gap 7      Glucose 105      BUN 15      Creatinine 0.87      BUN/Creatinine Ratio 17      Calcium 8.8      Total Protein 6.6      Albumin 2.9 (*)     Globulin 3.7      A/G Ratio 0.8      Bilirubin, Total 0.5      Alk Phos 132 (*)     ALT 47      AST 18      eGFR 108     PROTIME-INR - Abnormal    PT 15.3 (*)     INR 1.22     CBC WITH DIFFERENTIAL - Abnormal    WBC 7.73      RBC 4.54 (*)     Hemoglobin 12.1 (*)     Hematocrit 38.7 (*)     MCV 85.2      MCH 26.7 (*)     MCHC 31.3 (*)     RDW 14.6 (*)     Platelet Count 173      MPV 9.9      Neutrophils % 72.6 (*)     Lymphocytes % 17.6 (*)     Monocytes % 8.0 (*)     Eosinophils % 0.8 (*)     Basophils % 0.4      Immature Granulocytes % 0.6 (*)     nRBC, Auto 0.0      Neutrophils, Abs 5.61      Lymphocytes, Absolute 1.36      Monocytes, Absolute 0.62      Eosinophils, Absolute 0.06      Basophils, Absolute 0.03      Immature Granulocytes, Absolute 0.05 (*)     nRBC, Absolute 0.00      Diff Type Auto     TSH - Normal    TSH 0.786     CBC W/ AUTO DIFFERENTIAL    Narrative:     The following orders were created for panel order CBC W/ AUTO DIFFERENTIAL.  Procedure                               Abnormality         Status                     ---------                               -----------         ------                     CBC with Differential[[id number]]       Abnormal            Final result                 Please view results for these tests on the individual orders.   LIPID PANEL    Triglycerides 104      Cholesterol 198      HDL Cholesterol 49      Cholesterol/HDL Ratio (Risk Factor) 4.0      Non-      LDL Calculated 128      LDL/HDL 2.6      VLDL 21     POCT GLUCOSE MONITORING CONTINUOUS          Imaging Results              X-Ray Chest AP Portable (Final result)  Result time 09/26/24 12:06:16      Final result by  "Hawk Roland MD (09/26/24 12:06:16)                   Impression:      No acute cardiopulmonary finding identified on this single view.      Electronically signed by: Hawk Roland MD  Date:    09/26/2024  Time:    12:06               Narrative:    EXAMINATION:  XR CHEST AP PORTABLE    CLINICAL HISTORY:  Provided history is "Patient with some shortness breath.  And had left-sided;  ".    TECHNIQUE:  One view of the chest.    COMPARISON:  01/04/2024.    FINDINGS:  Cardiac wires overlie the chest.  Cardiomediastinal silhouette is not enlarged.  Minimally coarse interstitial lung markings.  No confluent area of consolidation.  No large pleural effusion.  No pneumothorax.                                        CT Head Without Contrast (Final result)  Result time 09/26/24 12:03:58      Final result by Hawk Roland MD (09/26/24 12:03:58)                   Impression:      Masslike region of hypoattenuation in the left thalamus, left cerebral hemisphere, and left cerebral peduncle, increased in size with worsening mass effect when compared with prior CT dated 07/28/2024.  Approximately 4 mm rightward midline shift and mass effect on the left lateral and 3rd ventricles.  Minimal increased attenuation within this lesion could be related to post treatment changes or trace intralesional hemorrhage.  No sizable intracranial hemorrhage.  Consider neuro surgical evaluation and follow-up MRI with and without contrast as clinically appropriate.  Correlation with recent prior outside imaging could also be helpful if available.    Additional findings discussed in the body of the report.    This report was flagged in Epic as abnormal.      Electronically signed by: Hawk Roland MD  Date:    09/26/2024  Time:    12:03               Narrative:    EXAMINATION:  CT HEAD WITHOUT CONTRAST    CLINICAL HISTORY:  Headache, chronic, new features or increased frequency;    TECHNIQUE:  Low dose axial images were obtained " through the head.  Coronal and sagittal reformations were also performed. Contrast was not administered.    COMPARISON:  CT head, 07/28/2024.  MRI report from outside hospital dated 09/05/2024 (images not available for review).    FINDINGS:  Large masslike region of hypoattenuation in the left thalamus and left cerebral hemisphere region, as well as the left cerebral peduncle measuring roughly 6 x 5 cm (axial image 16).  This area of hypoattenuation has increased in size when compared with the prior CT.  Minimal increased density within this lesion could be related to posttreatment changes though trace intralesional hemorrhage could appear similarly (coronal images 37-38).  No sizable intracranial hemorrhage.  There is associated mass effect on the left lateral and 3rd ventricles with narrowing of the 3rd ventricle.  Roughly 4 mm rightward midline shift.    Otherwise, no evidence of acute territorial infarct or acute intracranial hemorrhage.  No extra-axial hemorrhage identified.  No significant crowding in the posterior fossa.    Ventricles are not significantly enlarged, noting mild mass effect on the left lateral and 3rd ventricles.    No displaced calvarial fracture.  Similar well-defined small lucencies in the superior calvarium.    Visualized paranasal sinuses and mastoid air cells are essentially clear.                                       Medications   dexAMETHasone injection 10 mg (has no administration in time range)     Medical Decision Making  Amount and/or Complexity of Data Reviewed  Labs: ordered.  Radiology: ordered.               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  Patient is left  with residual right-sided weakness.  Comes in again today with complaints of left-sided weakness--[facility]th [facility]--he also was recently treated for a saddle block PE and was treated with a thrombectomy--on September 8th he was discharged home with [facility]---as per this morning the wife says that with the left arm he could not even hold a cup of coffee he was having excruciating pain in the left arm and left leg--        Differential Diagnosis:   New changes of left-sided arm weakness that resolved.  Cerebral edema showing worsening changes of the thalamic region of the brain  ED Management:  Discussed this case with Dr. Hernandez.  #[id number] will give Decadron 10 mg IM and sent him on on Decadron p.o. 3 times a day 4 mg             Clinical Impression:   Final diagnoses:  [R29.818] Acute focal neurological deficit  [G93.6] Cerebral edema (Primary)        ED Disposition Condition    Discharge Stable          ED Prescriptions       Medication Sig Dispense Start Date End Date Auth. Provider    dexAMETHasone (DECADRON) 4 MG Tab Take 1 tablet (4 mg total) by mouth 3 (three) times daily. 40 tablet 9/26/2024 11/5/2024 Lloyd Mackenzie,           Follow-up Information    None          Lloyd Mackenzie,   09/26/24 [id number]

## 2024-09-26 NOTE — TELEMEDICINE CONSULT
[facility]  Vascular Neurology  Comprehensive Stroke Center  TeleVascular Neurology Interprofessional Consult Note           Consult Information  Consults    Consulting Provider: KITTY VINCENT   Patient Location:  [facility] EMERGENCY DEPART*     Summary of patient's symptoms:  H/o L temporal GBM s/p resection.  Recent admission for PE on Eliquis.  Presenting with L sided weakness/pain.     Images personally reviewed and interpreted:  Study: Head CT  Study Interpretation: L temporal / BG hypodensity    Assessment and plan:  Suspect tumor related symptoms.  Not a TNK candidate given Eliquis usage and GBM.  Recommend further evaluation with MRI brain w/ Gd.     I spent approximately 21 minutes on this encounter. More than half of that time was spent communicating with the consulting provider and coordinating patient care.       Luciana Corral MD        This encounter was conducted as an interprofessional communication between providers at the [facility] and vascular neurologist. The interaction was completed over the phone or via secure messaging (electronic medical record - [employer] Secure Chat).     Once this note was completed, a written copy was sent back to the provider via fax or electronic medical record.

## 2024-09-27 ENCOUNTER — TELEPHONE (OUTPATIENT)
Dept: EMERGENCY MEDICINE | Facility: HOSPITAL | Age: 44
End: 2024-09-27
Payer: OTHER GOVERNMENT

## 2024-09-30 LAB
OHS QRS DURATION: 100 MS
OHS QTC CALCULATION: 393 MS

## 2024-10-02 DIAGNOSIS — I63.9 CEREBRAL INFARCTION: Primary | ICD-10-CM

## 2024-10-03 DIAGNOSIS — K92.1 MELENA: Primary | ICD-10-CM

## 2024-10-08 DIAGNOSIS — K92.1 MELENA: Primary | ICD-10-CM

## 2024-10-10 ENCOUNTER — OFFICE VISIT (OUTPATIENT)
Dept: PULMONOLOGY | Facility: CLINIC | Age: 44
End: 2024-10-10
Payer: OTHER GOVERNMENT

## 2024-10-10 VITALS
HEIGHT: 72 IN | SYSTOLIC BLOOD PRESSURE: 122 MMHG | WEIGHT: 270 LBS | HEART RATE: 91 BPM | OXYGEN SATURATION: 87 % | BODY MASS INDEX: 36.57 KG/M2 | DIASTOLIC BLOOD PRESSURE: 88 MMHG | RESPIRATION RATE: 16 BRPM

## 2024-10-10 DIAGNOSIS — R09.02 HYPOXIA: Primary | ICD-10-CM

## 2024-10-10 PROCEDURE — 99999 PR PBB SHADOW E&M-EST. PATIENT-LVL V: CPT | Mod: PBBFAC,,, | Performed by: INTERNAL MEDICINE

## 2024-10-10 PROCEDURE — 99215 OFFICE O/P EST HI 40 MIN: CPT | Mod: PBBFAC | Performed by: INTERNAL MEDICINE

## 2024-10-10 PROCEDURE — 99204 OFFICE O/P NEW MOD 45 MIN: CPT | Mod: S$PBB,,, | Performed by: INTERNAL MEDICINE

## 2024-10-10 RX ORDER — LORATADINE 10 MG/1
1 TABLET ORAL DAILY
COMMUNITY

## 2024-10-10 RX ORDER — SENNOSIDES 8.6 MG/1
8.6 TABLET ORAL
COMMUNITY
Start: 2024-08-14 | End: 2025-08-14

## 2024-10-10 RX ORDER — POLYETHYLENE GLYCOL 3350 17 G/17G
17 POWDER, FOR SOLUTION ORAL
COMMUNITY
Start: 2024-08-15

## 2024-10-10 RX ORDER — LEVETIRACETAM 1000 MG/1
1000 TABLET ORAL 2 TIMES DAILY
COMMUNITY
Start: 2024-08-14 | End: 2025-08-14

## 2024-10-10 RX ORDER — OXYCODONE HYDROCHLORIDE 5 MG/1
5 TABLET ORAL
COMMUNITY
Start: 2024-08-14

## 2024-10-10 RX ORDER — FOLIC ACID 1 MG/1
1000 TABLET ORAL DAILY
COMMUNITY

## 2024-10-10 NOTE — ASSESSMENT & PLAN NOTE
Very complicated patient with multiple medical problems.  He was diagnosed with a glioblastoma had stroke was receiving rehab at [facility] in [place] when he had a bilateral saddle pulmonary embolus he underwent peripheral embolectomy.  He he survived this and was back in the emergency room in the last several weeks with mental status change in more problems with hypoxia he was found to have diffuse cerebral put on Decadron he is markedly improved from that today and he has O2 sats are 93 in 1 hand and 95 in the other. he denies shortness of breath with activities having no chest pain.  He needs to take Eliquis as long as he is being treated for cancer and maybe for life.  He is having a CT chest tomorrow for some reason asked if they could change to a CT a PE protocol that will give us a parenchymal you as well as see if there is any residual clot.  From my standpoint I do not think he could tolerate PFTs any certainly can not do a 6 minute walk test my recommendation would be to watch expectantly that if he has worsening hypoxia we certainly can start oxygen or inhalers I do not think they are indicated today he has come see me p.r.n.

## 2024-10-10 NOTE — PROGRESS NOTES
Subjective:       Patient ID: Jigar Larson is a 44 y.o. male.    Chief Complaint: PE ([facility]. This happened 2nd of September. Started on eliquis 5mg BID.)    Patient without complaints today      Past Medical History:   Diagnosis Date    Anxiety disorder, unspecified     GERD (gastroesophageal reflux disease)     Glioblastoma     Other pulmonary embolism without acute cor pulmonale     Palpitations     Rheumatoid arthritis     Sleep apnea      Past Surgical History:   Procedure Laterality Date    LEFT HEART CATHETERIZATION N/A 06/06/2023    Procedure: Left heart cath;  Surgeon: Fabio Lacy MD;  Location: [facility] CATH LAB;  Service: Cardiology;  Laterality: N/A;    LIPOMA RESECTION      LIPOMA RESECTION Left 10/16/2023    Procedure: EXCISION, LEFT BACK LIPOMA;  Surgeon: Jose M Meier DO;  Location: [facility] OR;  Service: General;  Laterality: Left;     Family History   Problem Relation Name Age of Onset    Lung cancer Mother      Allergies Brother       Review of patient's allergies indicates:  No Known Allergies   Social History     Tobacco Use    Smoking status: Never     Passive exposure: Never    Smokeless tobacco: Never   Substance Use Topics    Alcohol use: Never    Drug use: Never      Review of Systems   Constitutional:  Negative for chills, activity change and night sweats.   HENT:  Negative for congestion and ear pain.    Eyes:  Negative for redness and itching.   Cardiovascular:  Negative for chest pain and palpitations.   Musculoskeletal:  Negative for arthralgias and back pain.   Skin:  Negative for rash.   Gastrointestinal:  Negative for abdominal pain and abdominal distention.   Neurological:  Negative for dizziness and headaches.   Hematological:  Negative for adenopathy. Does not bruise/bleed easily.   Psychiatric/Behavioral:  Negative for confusion. The patient is not nervous/anxious.        Objective:      Physical Exam   Constitutional: He is  oriented to person, place, and time. He appears well-developed and well-nourished.   HENT:   Head: Normocephalic.   Nose: Nose normal.   Mouth/Throat: Oropharynx is clear and moist.   Neck: No JVD present. No thyromegaly present.   Cardiovascular: Normal rate, regular rhythm, normal heart sounds and intact distal pulses.   Pulmonary/Chest: Normal expansion, hyperinflation, symmetric chest wall expansion, effort normal and breath sounds normal.   Abdominal: Soft. Bowel sounds are normal.   Musculoskeletal:         General: Normal range of motion.      Cervical back: Normal range of motion and neck supple.   Lymphadenopathy: No supraclavicular adenopathy is present.     He has no cervical adenopathy.   Neurological: He is alert and oriented to person, place, and time. He has normal reflexes.   Skin: Skin is warm and dry.   Psychiatric: He has a normal mood and affect. His behavior is normal.     Personal Diagnostic Review  none pertinent        10/10/2024     2:07 PM 9/26/2024     1:58 PM 9/26/2024     1:43 PM 9/26/2024     1:37 PM 9/26/2024     1:28 PM 9/26/2024    11:57 AM 9/26/2024    11:50 AM   Pulmonary Function Tests   SpO2 87 % 96 % 95 % 91 % 95 % 93 % 93 %   Height 6' (1.829 m)         Weight 122.5 kg (270 lb)         BMI (Calculated) 36.6               Assessment:       1. Hypoxia        Outpatient Encounter Medications as of 10/10/2024   Medication Sig Dispense Refill    apixaban (ELIQUIS) 5 mg Tab Take 5 mg by mouth 2 (two) times daily.      cetirizine (ZYRTEC) 10 MG tablet Take 10 mg by mouth every evening.      dapsone 100 MG Tab Take 100 mg by mouth once daily.      dexAMETHasone (DECADRON) 4 MG Tab Take 1 tablet (4 mg total) by mouth 3 (three) times daily. 40 tablet 0    pantoprazole (PROTONIX) 40 MG tablet Take 1 tablet by mouth once daily.      temozolomide (TEMODAR) 180 MG capsule Take 180 mg by mouth once daily.      temozolomide (TEMODAR) 5 MG capsule Take 5 mg by mouth.      clonazePAM (KLONOPIN)  0.5 MG tablet Take 0.5 mg by mouth nightly as needed. (Patient not taking: Reported on 10/10/2024)      etanercept (ENBREL) 50 mg/mL (1 mL) injection Inject 50 mg into the skin once a week. (Patient not taking: Reported on 10/10/2024)      folic acid (FOLVITE) 1 MG tablet Take 1,000 mcg by mouth once daily. (Patient not taking: Reported on 10/10/2024)      gabapentin (NEURONTIN) 300 MG capsule Take 1 capsule (300 mg total) by mouth 2 (two) times daily. (Patient not taking: Reported on 10/10/2024) 180 capsule 1    hydrOXYzine pamoate (VISTARIL) 25 MG Cap Take 25 mg by mouth. (Patient not taking: Reported on 10/10/2024)      ibuprofen (ADVIL,MOTRIN) 800 MG tablet Take 1 tablet (800 mg total) by mouth every 6 (six) hours as needed for Pain. (Patient not taking: Reported on 10/10/2024) 20 tablet 0    levETIRAcetam (KEPPRA) 1000 MG tablet Take 1,000 mg by mouth 2 (two) times daily. (Patient not taking: Reported on 10/10/2024)      loratadine (CLARITIN) 10 mg tablet Take 1 tablet by mouth once daily. (Patient not taking: Reported on 10/10/2024)      LORazepam (ATIVAN) 1 MG tablet Take 1 tablet (1 mg total) by mouth 1 (one) time if needed for Anxiety (30 min prior to MRI). (Patient not taking: Reported on 10/10/2024) 1 tablet 0    methotrexate, PF, (OTREXUP) 10 mg/0.4 mL AtIn Inject into the skin. (Patient not taking: Reported on 10/10/2024)      metoprolol succinate (TOPROL-XL) 50 MG 24 hr tablet Take 1 tablet (50 mg total) by mouth once daily. (Patient not taking: Reported on 10/10/2024) 90 tablet 3    mv-mn/iron/folic acid/herb 190 (VITAMIN D3 COMPLETE ORAL) Take by mouth once daily. (Patient not taking: Reported on 10/10/2024)      ondansetron (ZOFRAN) 8 MG tablet Take 8 mg by mouth every 12 (twelve) hours as needed for Nausea. (Patient not taking: Reported on 10/10/2024)      oxyCODONE (ROXICODONE) 5 MG immediate release tablet Take 5 mg by mouth. (Patient not taking: Reported on 10/10/2024)      polyethylene glycol  (GLYCOLAX) 17 gram PwPk Take 17 g by mouth. (Patient not taking: Reported on 10/10/2024)      senna (SENOKOT) 8.6 mg tablet Take 8.6 mg by mouth. (Patient not taking: Reported on 10/10/2024)      traZODone (DESYREL) 50 MG tablet Take 50 mg by mouth every evening. (Patient not taking: Reported on 10/10/2024)      vilazodone (VIIBRYD) 20 mg Tab Take 20 mg by mouth once daily. (Patient not taking: Reported on 10/10/2024)       No facility-administered encounter medications on file as of 10/10/2024.     No orders of the defined types were placed in this encounter.      Plan:       Problem List Items Addressed This Visit          Pulmonary    Hypoxia - Primary     Very complicated patient with multiple medical problems.  He was diagnosed with a glioblastoma had stroke was receiving rehab at [facility] when he had a bilateral saddle pulmonary embolus he underwent peripheral embolectomy.  He he survived this and was back in the emergency room in the last several weeks with mental status change in more problems with hypoxia he was found to have diffuse cerebral put on Decadron he is markedly improved from that today and he has O2 sats are 93 in 1 hand and 95 in the other. he denies shortness of breath with activities having no chest pain.  He needs to take Eliquis as long as he is being treated for cancer and maybe for life.  He is having a CT chest tomorrow for some reason asked if they could change to a CT a PE protocol that will give us a parenchymal you as well as see if there is any residual clot.  From my standpoint I do not think he could tolerate PFTs any certainly can not do a 6 minute walk test my recommendation would be to watch expectantly that if he has worsening hypoxia we certainly can start oxygen or inhalers I do not think they are indicated today he has come see me p.r.n.

## 2024-10-23 ENCOUNTER — OFFICE VISIT (OUTPATIENT)
Dept: NEUROLOGY | Facility: CLINIC | Age: 44
End: 2024-10-23
Payer: OTHER GOVERNMENT

## 2024-10-23 VITALS
WEIGHT: 276.38 LBS | OXYGEN SATURATION: 94 % | BODY MASS INDEX: 37.43 KG/M2 | SYSTOLIC BLOOD PRESSURE: 121 MMHG | HEIGHT: 72 IN | RESPIRATION RATE: 18 BRPM | HEART RATE: 90 BPM | DIASTOLIC BLOOD PRESSURE: 75 MMHG

## 2024-10-23 DIAGNOSIS — G81.91 RIGHT HEMIPARESIS: ICD-10-CM

## 2024-10-23 DIAGNOSIS — C71.9 GLIOBLASTOMA: ICD-10-CM

## 2024-10-23 DIAGNOSIS — I63.9 CEREBRAL INFARCTION: Primary | ICD-10-CM

## 2024-10-23 PROCEDURE — 99999 PR PBB SHADOW E&M-EST. PATIENT-LVL V: CPT | Mod: PBBFAC,,, | Performed by: NURSE PRACTITIONER

## 2024-10-23 PROCEDURE — 99214 OFFICE O/P EST MOD 30 MIN: CPT | Mod: S$PBB,,, | Performed by: NURSE PRACTITIONER

## 2024-10-23 PROCEDURE — 99215 OFFICE O/P EST HI 40 MIN: CPT | Mod: PBBFAC | Performed by: NURSE PRACTITIONER

## 2024-10-23 NOTE — PROGRESS NOTES
Subjective:       Patient ID: Jigar Larson is a 44 y.o. male     Chief Complaint:  No chief complaint on file.       Allergies:  Patient has no known allergies.    Current Medications:    Outpatient Encounter Medications as of 10/23/2024   Medication Sig Dispense Refill    apixaban (ELIQUIS) 5 mg Tab Take 5 mg by mouth 2 (two) times daily.      cetirizine (ZYRTEC) 10 MG tablet Take 10 mg by mouth every evening.      dapsone 100 MG Tab Take 100 mg by mouth once daily.      dexAMETHasone (DECADRON) 4 MG Tab Take 1 tablet (4 mg total) by mouth 3 (three) times daily. 40 tablet 0    pantoprazole (PROTONIX) 40 MG tablet Take 1 tablet by mouth once daily.      temozolomide (TEMODAR) 180 MG capsule Take 180 mg by mouth once daily.      temozolomide (TEMODAR) 5 MG capsule Take 5 mg by mouth.      clonazePAM (KLONOPIN) 0.5 MG tablet Take 0.5 mg by mouth nightly as needed. (Patient not taking: Reported on 10/23/2024)      etanercept (ENBREL) 50 mg/mL (1 mL) injection Inject 50 mg into the skin once a week. (Patient not taking: Reported on 10/23/2024)      folic acid (FOLVITE) 1 MG tablet Take 1,000 mcg by mouth once daily. (Patient not taking: Reported on 10/23/2024)      gabapentin (NEURONTIN) 300 MG capsule Take 1 capsule (300 mg total) by mouth 2 (two) times daily. (Patient not taking: Reported on 10/23/2024) 180 capsule 1    hydrOXYzine pamoate (VISTARIL) 25 MG Cap Take 25 mg by mouth. (Patient not taking: Reported on 10/23/2024)      ibuprofen (ADVIL,MOTRIN) 800 MG tablet Take 1 tablet (800 mg total) by mouth every 6 (six) hours as needed for Pain. (Patient not taking: Reported on 10/23/2024) 20 tablet 0    levETIRAcetam (KEPPRA) 1000 MG tablet Take 1,000 mg by mouth 2 (two) times daily. (Patient not taking: Reported on 10/23/2024)      loratadine (CLARITIN) 10 mg tablet Take 1 tablet by mouth once daily. (Patient not taking: Reported on 10/23/2024)      LORazepam (ATIVAN) 1 MG tablet Take 1 tablet (1 mg total) by mouth  1 (one) time if needed for Anxiety (30 min prior to MRI). (Patient not taking: Reported on 10/23/2024) 1 tablet 0    methotrexate, PF, (OTREXUP) 10 mg/0.4 mL AtIn Inject into the skin. (Patient not taking: Reported on 10/23/2024)      metoprolol succinate (TOPROL-XL) 50 MG 24 hr tablet Take 1 tablet (50 mg total) by mouth once daily. (Patient not taking: Reported on 10/23/2024) 90 tablet 3    mv-mn/iron/folic acid/herb 190 (VITAMIN D3 COMPLETE ORAL) Take by mouth once daily. (Patient not taking: Reported on 10/23/2024)      ondansetron (ZOFRAN) 8 MG tablet Take 8 mg by mouth every 12 (twelve) hours as needed for Nausea. (Patient not taking: Reported on 10/23/2024)      oxyCODONE (ROXICODONE) 5 MG immediate release tablet Take 5 mg by mouth. (Patient not taking: Reported on 10/23/2024)      polyethylene glycol (GLYCOLAX) 17 gram PwPk Take 17 g by mouth. (Patient not taking: Reported on 10/23/2024)      senna (SENOKOT) 8.6 mg tablet Take 8.6 mg by mouth. (Patient not taking: Reported on 10/23/2024)      traZODone (DESYREL) 50 MG tablet Take 50 mg by mouth every evening. (Patient not taking: Reported on 10/23/2024)      vilazodone (VIIBRYD) 20 mg Tab Take 20 mg by mouth once daily. (Patient not taking: Reported on 10/23/2024)       No facility-administered encounter medications on file as of 10/23/2024.       History of Present Illness  43 y/o male following in neurology for more recent history of right hemiparesis    Currently treated after he was found in July of 2024 to have glioblastoma, left basal ganglia/temporal stem with left thalamic ischemic stroke, surgery per Dr. Rob at [facility] 8/6/24 with continued chemotherapy and radiation therapy since that time.    right hemiparesis, 3/5 upper and lower ext.  5/5 in the LUE and LLE.  He has right facial deviation, speech only slightly slurred, no expressive aphasia appreciated.  He is quite interactive and in good spirits.  He is getting PT at home through [facility]  and feels symptoms are improving as far as mobility is concerned.  He has reported lower peripheral vision complications.  Denies recent falls, does not report headaches.  No seizures.  Was on keppra at time of admit and treatment, but none since that time.    He is followed in pulmonary here due to finding of a PE in September, currently on eliquis, Dr. Lao note reviewed.  Currently he is to have follow-up imaging in November and follow-up with neuro-oncology and neurosurgery at [facility] at that time.    Denies any current needs from neurology but we will continue to monitor and follow him up in 3 months.           Review of Systems  Review of Systems   Constitutional:  Negative for diaphoresis and fever.   HENT:  Negative for congestion, hearing loss and tinnitus.    Eyes:  Positive for blurred vision. Negative for double vision, photophobia, discharge and redness.   Respiratory:  Negative for cough and shortness of breath.    Cardiovascular:  Negative for chest pain.   Gastrointestinal:  Negative for abdominal pain, nausea and vomiting.   Musculoskeletal:  Negative for back pain, joint pain, myalgias and neck pain.   Skin:  Negative for itching and rash.   Neurological:  Positive for tingling, sensory change, speech change, focal weakness and weakness. Negative for dizziness, tremors, seizures, loss of consciousness and headaches.   Psychiatric/Behavioral:  Negative for depression, hallucinations and memory loss. The patient does not have insomnia.    All other systems reviewed and are negative.     Objective:     NEUROLOGICAL EXAMINATION:     MENTAL STATUS   Oriented to person, place, and time.   Attention: normal. Concentration: normal.   Speech: speech is normal   Level of consciousness: alert  Knowledge: good and consistent with education.   Normal comprehension.     CRANIAL NERVES     CN II   Visual fields full to confrontation.   Visual acuity: normal  Right visual field deficit: none  Left visual field  deficit: none     CN III, IV, VI   Pupils are equal, round, and reactive to light.  Extraocular motions are normal.   Right pupil: Size: 3 mm. Shape: regular. Reactivity: brisk. Consensual response: intact. Accommodation: intact.   Left pupil: Size: 3 mm. Shape: regular. Reactivity: brisk. Consensual response: intact. Accommodation: intact.   CN III: no CN III palsy  CN VI: no CN VI palsy  Nystagmus: none   Diplopia: none  Upgaze: normal  Downgaze: normal  Conjugate gaze: present  Vestibulo-ocular reflex: present    CN V   Facial sensation intact.   Right facial sensation deficit: none  Left facial sensation deficit: none  Right corneal reflex: normal  Left corneal reflex: normal    CN VII   Facial expression full, symmetric.   Right facial weakness: none  Left facial weakness: none  Right taste: normal  Left taste: normal    CN VIII   CN VIII normal.   Hearing: intact    CN IX, X   CN IX normal.   CN X normal.   Palate: symmetric    CN XI   CN XI normal.   Right sternocleidomastoid strength: normal  Left sternocleidomastoid strength: normal  Right trapezius strength: normal  Left trapezius strength: normal    CN XII   CN XII normal.   Tongue: not atrophic  Fasciculations: absent  Tongue deviation: none    MOTOR EXAM   Muscle bulk: normal  Overall muscle tone: normal  Right arm tone: decreased  Left arm tone: normal  Left arm pronator drift: absent  Right leg tone: decreased  Left leg tone: normal    Strength   Right neck flexion: 5/5  Left neck flexion: 5/5  Right neck extension: 5/5  Left neck extension: 5/5  Right deltoid: 3/5  Left deltoid: 5/5  Right biceps: 3/5  Left biceps: 5/5  Right triceps: 3/5  Left triceps: 5/5  Right wrist flexion: 3/5  Left wrist flexion: 5/5  Right wrist extension: 3/5  Left wrist extension: 5/5  Right interossei: 3/5  Left interossei: 5/5  Right iliopsoas: 3/5  Left iliopsoas: 5/5  Right quadriceps: 3/5  Left quadriceps: 5/5  Right hamstring: 3/5  Left hamstrin/5  Right anterior  tibial: 3/5  Left anterior tibial: 5/5  Right posterior tibial: 3/5  Left posterior tibial: 5/5  Right gastroc: 3/5  Left gastroc: 5/5    REFLEXES     Reflexes   Right brachioradialis: 2+  Left brachioradialis: 2+  Right biceps: 2+  Left biceps: 2+  Right triceps: 2+  Left triceps: 2+  Right patellar: 2+  Left patellar: 2+  Right achilles: 2+  Left achilles: 2+  Right : 1+  Left : 2+    SENSORY EXAM   Light touch normal.   Right arm light touch: normal  Left arm light touch: normal  Right leg light touch: normal  Left leg light touch: normal  Vibration normal.   Right arm vibration: normal  Left arm vibration: normal  Right leg vibration: normal  Left leg vibration: normal  Proprioception normal.   Right arm proprioception: normal  Left arm proprioception: normal  Right leg proprioception: normal  Left leg proprioception: normal  Pinprick normal.   Right arm pinprick: normal  Left arm pinprick: normal  Right leg pinprick: normal  Left leg pinprick: normal  Graphesthesia: normal  Romberg: negative  Stereognosis: normal    GAIT AND COORDINATION      Coordination   Finger to nose coordination: abnormal  Heel to shin coordination: abnormal  Tandem walking coordination: abnormal    Tremor   Resting tremor: absent  Intention tremor: absent  Action tremor: absent       Using wheelchair for ambulation in clinic        Physical Exam  Vitals and nursing note reviewed.   Constitutional:       Appearance: Normal appearance.   HENT:      Head: Normocephalic.   Eyes:      Extraocular Movements: EOM normal.      Pupils: Pupils are equal, round, and reactive to light.   Cardiovascular:      Rate and Rhythm: Normal rate and regular rhythm.   Pulmonary:      Effort: Pulmonary effort is normal.   Musculoskeletal:         General: Normal range of motion.      Cervical back: Normal range of motion and neck supple.   Skin:     General: Skin is warm and dry.   Neurological:      General: No focal deficit present.      Mental  Status: He is alert and oriented to person, place, and time.      Cranial Nerves: Cranial nerve deficit present.      Sensory: No sensory deficit.      Motor: Weakness present.      Coordination: Coordination abnormal. Finger-Nose-Finger Test abnormal and Heel to Shin Test abnormal. Romberg Test normal.      Gait: Gait abnormal and tandem walk abnormal.      Deep Tendon Reflexes: Reflexes normal.      Reflex Scores:       Tricep reflexes are 2+ on the right side and 2+ on the left side.       Bicep reflexes are 2+ on the right side and 2+ on the left side.       Brachioradialis reflexes are 2+ on the right side and 2+ on the left side.       Patellar reflexes are 2+ on the right side and 2+ on the left side.       Achilles reflexes are 2+ on the right side and 2+ on the left side.  Psychiatric:         Mood and Affect: Mood normal.         Speech: Speech normal.         Behavior: Behavior normal.          Assessment:     Problem List Items Addressed This Visit          Neuro    Right hemiparesis    Cerebral infarction - Primary       Oncology    Glioblastoma        Primary Diagnosis and ICD10  Cerebral infarction [I63.9]    Plan:     There are no Patient Instructions on file for this visit.    There are no discontinued medications.    Requested Prescriptions      No prescriptions requested or ordered in this encounter       No orders of the defined types were placed in this encounter.

## 2024-10-28 PROBLEM — I63.312 STROKE DUE TO THROMBOSIS OF LEFT MIDDLE CEREBRAL ARTERY: Status: RESOLVED | Noted: 2024-07-28 | Resolved: 2024-10-28

## 2024-11-04 ENCOUNTER — OFFICE VISIT (OUTPATIENT)
Dept: GASTROENTEROLOGY | Facility: CLINIC | Age: 44
End: 2024-11-04
Payer: OTHER GOVERNMENT

## 2024-11-04 VITALS
OXYGEN SATURATION: 95 % | HEART RATE: 93 BPM | BODY MASS INDEX: 37.38 KG/M2 | HEIGHT: 72 IN | WEIGHT: 276 LBS | SYSTOLIC BLOOD PRESSURE: 126 MMHG | DIASTOLIC BLOOD PRESSURE: 80 MMHG

## 2024-11-04 DIAGNOSIS — K64.1 GRADE II HEMORRHOIDS: Primary | ICD-10-CM

## 2024-11-04 DIAGNOSIS — K92.1 MELENA: ICD-10-CM

## 2024-11-04 PROCEDURE — 99999 PR PBB SHADOW E&M-EST. PATIENT-LVL IV: CPT | Mod: PBBFAC,,,

## 2024-11-04 PROCEDURE — 99214 OFFICE O/P EST MOD 30 MIN: CPT | Mod: S$PBB,,,

## 2024-11-04 PROCEDURE — 99214 OFFICE O/P EST MOD 30 MIN: CPT | Mod: PBBFAC

## 2024-11-04 RX ORDER — HYDROCORTISONE ACETATE 25 MG/1
25 SUPPOSITORY RECTAL 2 TIMES DAILY
Qty: 20 SUPPOSITORY | Refills: 0 | Status: SHIPPED | OUTPATIENT
Start: 2024-11-04 | End: 2024-11-14

## 2024-11-04 NOTE — PROGRESS NOTES
CC:  Hemorrhoid    HPI 44 y.o. white male presents today for complaint of hemorrhoids.  Patient has a history of a glioblastoma in has finished his chemo and radiation as of 10/25/2024.  He has a return appointment to see Oncology November 18th and we will plan to do scans at the time patient does have a lesion in his pancreas and seen Dr. Alexander, Dr. Alexander instructed patient and family that he would not do anything as far as EUS and pancreas during his chemo and radiation treatment that he is to return to see him post therapy completion.  Instruct patient in family that he would need to tell Oncology to get him back in to see Dr. Alexander so the pancreas can be looked at.  MRI of the abdomen in the pancreas shows 2.7 cm nonenhancing microcystic appearing lesion in the pancreatic body, likely representing a side branch IPMN. Additional small 6 mm nonenhancing lesion in the head of the pancreas, which appears to connect to the main pancreatic duct and may represent a small IPMN.   Patient did have an MRI of the abdomen that showed a lesion in the liver, additional T2 hyperintense lesion measuring up to 2.3 cm with mild nodular peripheral enhancement in the right hepatic lobe likely representing atypical hemangiomas.  Discuss this patient with Dr. Manzano, plan for this patient will be to treat with Anusol for hemorrhoids and follow-up with Dr. Alexander asap.  Lab patient return in six-months follow-up.  Colonoscopy 9/19/23 polyps found scattered diverticulosis grade 2 hemorrhoids recommended repeat 7 years.  Patient denies any hematochezia melena.  Patient denies any nausea vomiting or dysphagia    Medical records reviewed. Additional history supplemented by nursing.     Past Medical History:   Diagnosis Date    Anxiety disorder, unspecified     GERD (gastroesophageal reflux disease)     Glioblastoma     Other pulmonary embolism without acute cor pulmonale     Palpitations     Rheumatoid arthritis     Sleep apnea           Review of Systems  General ROS: negative for chills, fever or weight loss  Cardiovascular ROS: no chest pain or dyspnea on exertion  Gastrointestinal ROS: no abdominal pain, change in bowel habits, or black/ bloody stools    Physical Examination  /80   Pulse 93   Ht 6' (1.829 m)   Wt 125.2 kg (276 lb)   SpO2 95%   BMI 37.43 kg/m²   General appearance: alert, cooperative, no distress  HENT: Normocephalic, atraumatic, neck symmetrical, no nasal discharge   Lungs: no labored breathing, symmetric chest wall expansion bilaterally  Heart: regular rate and rhythm without rub; no displacement of the PMI   Abdomen: soft, non-tender; bowel sounds normoactive; no organomegaly    Labs:  Lab Results   Component Value Date    WBC 7.73 09/26/2024    HGB 12.1 (L) 09/26/2024    HCT 38.7 (L) 09/26/2024    MCV 85.2 09/26/2024     09/26/2024       CMP  Sodium   Date Value Ref Range Status   09/26/2024 136 136 - 145 mmol/L Final   08/10/2024 133 (L) 136 - 145 mmol/L Final     Potassium   Date Value Ref Range Status   09/26/2024 4.0 3.5 - 5.1 mmol/L Final     Chloride   Date Value Ref Range Status   09/26/2024 104 98 - 107 mmol/L Final     CO2   Date Value Ref Range Status   09/26/2024 29 21 - 32 mmol/L Final     Glucose   Date Value Ref Range Status   09/26/2024 105 74 - 106 mg/dL Final     BUN   Date Value Ref Range Status   09/26/2024 15 7 - 18 mg/dL Final     Creatinine   Date Value Ref Range Status   09/26/2024 0.87 0.70 - 1.30 mg/dL Final     Calcium   Date Value Ref Range Status   09/26/2024 8.8 8.5 - 10.1 mg/dL Final     Total Protein   Date Value Ref Range Status   09/26/2024 6.6 6.4 - 8.2 g/dL Final     Albumin   Date Value Ref Range Status   09/26/2024 2.9 (L) 3.5 - 5.0 g/dL Final     Bilirubin, Total   Date Value Ref Range Status   09/26/2024 0.5 >0.0 - 1.2 mg/dL Final     Alk Phos   Date Value Ref Range Status   09/26/2024 132 (H) 45 - 115 U/L Final     AST   Date Value Ref Range Status   09/26/2024  18 15 - 37 U/L Final     ALT   Date Value Ref Range Status   09/26/2024 47 16 - 61 U/L Final     Anion Gap   Date Value Ref Range Status   09/26/2024 7 7 - 16 mmol/L Final       Imaging:  MRI Abdomen W WO Contrast  Order: [id number]  Impression    IMPRESSION:    1.  Multiple arterial enhancing lesions throughout the right hepatic lobe measuring up to 1.5 cm in hepatic segment 6, which may represent metastatic disease and/or flash filling hemangiomas.  2.  Additional T2 hyperintense lesions measuring up to 2.3 cm with mild nodular peripheral enhancement in the right hepatic lobe, likely representing atypical hemangiomas.  3.  2.7 cm nonenhancing microcystic appearing lesion in the pancreatic body, likely representing a side branch IPMN. Additional small 6 mm nonenhancing lesion in the head of the pancreas, which appears to connect to the main pancreatic duct and may represent a small IPMN.  4.  Other findings as described.        RESIDENT RADIOLOGIST: Kai Agrawal MD    ATTENDING RADIOLOGIST: Dinah Gandhi M.D.    I have personally reviewed the image(s) and the resident's interpretations, performed any necessary editing, and agree with the findings of this report.  Narrative    RADIOLOGIC EXAM: MR ABDOMEN W WO IV CONTRAST    DATE AND TIME OF EXAMINATION: 8/13/2024 3:50 PM    CLINICAL HISTORY: 44-year-old male with glioblastoma. Evaluation of liver and pancreas lesions found on recent CT imaging.    COMPARISON: CT chest/abdomen/pelvis 8/2/2024    TECHNIQUE: MR ABDOMEN W WO IV CONTRAST; Coronal and axial images were obtained using T1-weighted, T2-weighted and dynamic gadolinium-enhanced technique. 20 ml of gadolinium contrast agent was administered. MRCP was also performed. Image quality is good.    FINDINGS:    LOWER CHEST: Mild bibasilar atelectasis. No large pleural or pericardial effusion. Heart size is normal.    LIVER: Multiple arterially enhancing lesions throughout the right hepatic lobe measuring up to  1.5 cm in segment 6, with mostly homogenous enhancement which become isointense to mildly hyperintense compared to background liver parenchyma on delayed imaging.    Additional T2 hyperintense lesions in the right hepatic lobe measuring up to 2.3 cm with mild nodular peripheral enhancement, likely representing hemangiomas. No restricted diffusion.    BILIARY: No intrahepatic or extrahepatic biliary ductal dilatation.    GALLBLADDER: Multiple small filling defects within the gallbladder lumen, which may represent small gallstones. No gallbladder wall thickening or pericholecystic edema.    SPLEEN: Normal.    PANCREAS: 2.7 x 2.1 x 2.5 cm nonenhancing T2 hyperintense lesion with a microcystic appearance in the pancreatic body. Additional 6 mm nonenhancing cystic appearing T2 hyperintense lesion in the head of the pancreas. Both lesions appear connected to the either main pancreatic duct or small side branches. No pancreatic ductal dilatation. Pancreas divisum is noted.    ADRENAL GLANDS:  Normal.    KIDNEYS: No suspicious lesion. Small simple left renal cyst. No hydronephrosis.    BOWEL: No abnormal bowel wall thickening or enhancement. Uncomplicated colonic diverticulosis. Small sliding hiatal hernia.    PERITONEAL CAVITY: No free fluid.    VASCULATURE:  The common hepatic artery appears to originate from the superior mesenteric artery. No aneurysm.    LYMPH NODES: No lymphadenopathy.    MUSCULOSKELETAL: No suspicious marrow signal or enhancement. Schmorl's nodes in the superior and inferior endplates of L1.    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 Alexander for IPMN of the pancreas.  Patient follows up November 18th with Oncology instructed patient and caretaker  that they needed to make sure he has an appointment for follow-up on his pancreas before they left on the 18th  Anusol suppositories for hemorrhoids  Follow-up in 6 months or sooner    30 minutes of total time spent on the encounter, which includes face to face time and non-face to face time preparing to see the patient (eg, review of tests), obtaining and/or reviewing separately obtained history, documenting clinical information in the electronic or other health record, Independently interpreting results (not separately reported) and communicating results to the patient/family/caregiver, or care coordination (not separately reported).         Shameka Morales, ANTIONEP  [facility]

## 2024-11-17 ENCOUNTER — HOSPITAL ENCOUNTER (EMERGENCY)
Facility: HOSPITAL | Age: 44
Discharge: HOME | End: 2024-11-17
Attending: EMERGENCY MEDICINE
Payer: OTHER GOVERNMENT

## 2024-11-17 VITALS
OXYGEN SATURATION: 94 % | HEART RATE: 99 BPM | BODY MASS INDEX: 37.25 KG/M2 | WEIGHT: 275 LBS | RESPIRATION RATE: 16 BRPM | HEIGHT: 72 IN | TEMPERATURE: 98 F | DIASTOLIC BLOOD PRESSURE: 97 MMHG | SYSTOLIC BLOOD PRESSURE: 126 MMHG

## 2024-11-17 DIAGNOSIS — R07.9 CHEST PAIN, UNSPECIFIED TYPE: Primary | ICD-10-CM

## 2024-11-17 DIAGNOSIS — R55 NEAR SYNCOPE: ICD-10-CM

## 2024-11-17 LAB
ALBUMIN SERPL BCP-MCNC: 3.2 G/DL (ref 3.5–5)
ALBUMIN/GLOB SERPL: 1.2 {RATIO}
ALP SERPL-CCNC: 86 U/L (ref 40–150)
ALT SERPL W P-5'-P-CCNC: 44 U/L (ref 0–55)
ANION GAP SERPL CALCULATED.3IONS-SCNC: 14 MMOL/L (ref 7–16)
APTT PPP: 23.6 SECONDS (ref 25.2–37.3)
AST SERPL W P-5'-P-CCNC: 27 U/L (ref 5–34)
BASOPHILS # BLD AUTO: 0.04 K/UL (ref 0–0.2)
BASOPHILS NFR BLD AUTO: 0.4 % (ref 0–1)
BILIRUB SERPL-MCNC: 0.4 MG/DL
BILIRUB UR QL STRIP: NEGATIVE
BUN SERPL-MCNC: 13 MG/DL (ref 9–21)
BUN/CREAT SERPL: 16 (ref 6–20)
CALCIUM SERPL-MCNC: 8.9 MG/DL (ref 8.4–10.2)
CHLORIDE SERPL-SCNC: 101 MMOL/L (ref 98–107)
CLARITY UR: CLEAR
CO2 SERPL-SCNC: 27 MMOL/L (ref 22–29)
COLOR UR: NORMAL
CREAT SERPL-MCNC: 0.79 MG/DL (ref 0.72–1.25)
DIFFERENTIAL METHOD BLD: ABNORMAL
EGFR (NO RACE VARIABLE) (RUSH/TITUS): 111 ML/MIN/1.73M2
EOSINOPHIL # BLD AUTO: 0.02 K/UL (ref 0–0.5)
EOSINOPHIL NFR BLD AUTO: 0.2 % (ref 1–4)
ERYTHROCYTE [DISTWIDTH] IN BLOOD BY AUTOMATED COUNT: 15 % (ref 11.5–14.5)
GLOBULIN SER-MCNC: 2.7 G/DL (ref 2–4)
GLUCOSE SERPL-MCNC: 120 MG/DL (ref 74–100)
GLUCOSE UR STRIP-MCNC: NORMAL MG/DL
HCT VFR BLD AUTO: 38.2 % (ref 40–54)
HGB BLD-MCNC: 12.5 G/DL (ref 13.5–18)
IMM GRANULOCYTES # BLD AUTO: 0.21 K/UL (ref 0–0.04)
IMM GRANULOCYTES NFR BLD: 1.9 % (ref 0–0.4)
INR BLD: 1.04
KETONES UR STRIP-SCNC: NEGATIVE MG/DL
LEUKOCYTE ESTERASE UR QL STRIP: NEGATIVE
LYMPHOCYTES # BLD AUTO: 1.73 K/UL (ref 1–4.8)
LYMPHOCYTES NFR BLD AUTO: 15.4 % (ref 27–41)
MAGNESIUM SERPL-MCNC: 1.9 MG/DL (ref 1.6–2.6)
MCH RBC QN AUTO: 28.8 PG (ref 27–31)
MCHC RBC AUTO-ENTMCNC: 32.7 G/DL (ref 32–36)
MCV RBC AUTO: 88 FL (ref 80–96)
MONOCYTES # BLD AUTO: 0.68 K/UL (ref 0–0.8)
MONOCYTES NFR BLD AUTO: 6.1 % (ref 2–6)
MPC BLD CALC-MCNC: 9.1 FL (ref 9.4–12.4)
NEUTROPHILS # BLD AUTO: 8.55 K/UL (ref 1.8–7.7)
NEUTROPHILS NFR BLD AUTO: 76 % (ref 53–65)
NITRITE UR QL STRIP: NEGATIVE
NRBC # BLD AUTO: 0 X10E3/UL
NRBC, AUTO (.00): 0 %
NT-PROBNP SERPL-MCNC: 53 PG/ML (ref 1–125)
PH UR STRIP: 7 PH UNITS
PLATELET # BLD AUTO: 127 K/UL (ref 150–400)
POTASSIUM SERPL-SCNC: 4.2 MMOL/L (ref 3.5–5.1)
PROT SERPL-MCNC: 5.9 G/DL (ref 6.4–8.3)
PROT UR QL STRIP: NEGATIVE
PROTHROMBIN TIME: 13.5 SECONDS (ref 11.7–14.7)
RBC # BLD AUTO: 4.34 M/UL (ref 4.6–6.2)
RBC # UR STRIP: NEGATIVE /UL
SODIUM SERPL-SCNC: 138 MMOL/L (ref 136–145)
SP GR UR STRIP: 1.01
TROPONIN I SERPL HS-MCNC: 16.8 NG/L
UROBILINOGEN UR STRIP-ACNC: NORMAL MG/DL
WBC # BLD AUTO: 11.23 K/UL (ref 4.5–11)

## 2024-11-17 PROCEDURE — 36415 COLL VENOUS BLD VENIPUNCTURE: CPT | Performed by: EMERGENCY MEDICINE

## 2024-11-17 PROCEDURE — 85025 COMPLETE CBC W/AUTO DIFF WBC: CPT | Performed by: EMERGENCY MEDICINE

## 2024-11-17 PROCEDURE — 83735 ASSAY OF MAGNESIUM: CPT | Performed by: EMERGENCY MEDICINE

## 2024-11-17 PROCEDURE — 83880 ASSAY OF NATRIURETIC PEPTIDE: CPT | Performed by: EMERGENCY MEDICINE

## 2024-11-17 PROCEDURE — 85610 PROTHROMBIN TIME: CPT | Performed by: EMERGENCY MEDICINE

## 2024-11-17 PROCEDURE — 80053 COMPREHEN METABOLIC PANEL: CPT | Performed by: EMERGENCY MEDICINE

## 2024-11-17 PROCEDURE — 85730 THROMBOPLASTIN TIME PARTIAL: CPT | Performed by: EMERGENCY MEDICINE

## 2024-11-17 PROCEDURE — 81003 URINALYSIS AUTO W/O SCOPE: CPT | Performed by: EMERGENCY MEDICINE

## 2024-11-17 PROCEDURE — 84484 ASSAY OF TROPONIN QUANT: CPT | Performed by: EMERGENCY MEDICINE

## 2024-11-17 PROCEDURE — 93005 ELECTROCARDIOGRAM TRACING: CPT

## 2024-11-17 PROCEDURE — 99285 EMERGENCY DEPT VISIT HI MDM: CPT | Mod: 25

## 2024-11-17 NOTE — ED PROVIDER NOTES
Encounter Date: 11/17/2024    SCRIBE #1 NOTE: I, Crystal Menchaca, am scribing for, and in the presence of,  Luis Manuel Bustillos MD. I have scribed the entire note.       History     Chief Complaint   Patient presents with    Chest Pain    Fatigue     Pt reports cp and weakness after having a bowel movement. Wife reports pt had lost all color and he was sweaty     The pt is a 45 y/o male coming into the ED with complaints  of CP, SOB, weakness, and diaphoresis after a bowel movement. The pt's wife states he was also very pale and cold to the touch. She reports this is the first time something like this has happened to him. Pt denies nausea and vomiting. He also reports using a CPAP machine at home. There are no other complaints at this time.    The history is provided by the patient and the spouse. No  was used.     Review of patient's allergies indicates:  No Known Allergies  Past Medical History:   Diagnosis Date    Anxiety disorder, unspecified     GERD (gastroesophageal reflux disease)     Glioblastoma     Other pulmonary embolism without acute cor pulmonale     Palpitations     Rheumatoid arthritis     Sleep apnea      Past Surgical History:   Procedure Laterality Date    LEFT HEART CATHETERIZATION N/A 06/06/2023    Procedure: Left heart cath;  Surgeon: Fabio Lacy MD;  Location: [facility] CATH LAB;  Service: Cardiology;  Laterality: N/A;    LIPOMA RESECTION      LIPOMA RESECTION Left 10/16/2023    Procedure: EXCISION, LEFT BACK LIPOMA;  Surgeon: Jose M Meier DO;  Location: [facility] OR;  Service: General;  Laterality: Left;     Family History   Problem Relation Name Age of Onset    Lung cancer Mother      Allergies Brother       Social History     Tobacco Use    Smoking status: Never     Passive exposure: Never    Smokeless tobacco: Never   Substance Use Topics    Alcohol use: Never    Drug use: Never     Review of Systems   Constitutional:  Positive for diaphoresis.   Respiratory:   Positive for shortness of breath.    Cardiovascular:  Positive for chest pain.   Gastrointestinal:  Negative for nausea and vomiting.   Skin:  Positive for color change.   Neurological:  Positive for weakness.   All other systems reviewed and are negative.      Physical Exam     Initial Vitals [11/17/24 0850]   BP Pulse Resp Temp SpO2   114/79 86 17 97.8 °F (36.6 °C) 96 %      MAP       --         Physical Exam    Nursing note and vitals reviewed.  Constitutional: He appears well-developed and well-nourished. He is not diaphoretic. No distress.   HENT:   Head: Normocephalic and atraumatic.   Nose: Nose normal. Mouth/Throat: Oropharynx is clear and moist.   Eyes: Conjunctivae and EOM are normal. Pupils are equal, round, and reactive to light. No scleral icterus.   Neck: Neck supple. No JVD present.   Normal range of motion.  Cardiovascular:  Normal rate, regular rhythm and normal heart sounds.     Exam reveals no gallop and no friction rub.       No murmur heard.  Pulmonary/Chest: Breath sounds normal. No stridor. No respiratory distress. He has no wheezes. He exhibits no tenderness.   Abdominal: Abdomen is soft. Bowel sounds are normal. He exhibits no distension and no mass. There is no abdominal tenderness. There is no rebound and no guarding.   Musculoskeletal:         General: No tenderness or edema. Normal range of motion.      Cervical back: Normal range of motion and neck supple. Normal.      Thoracic back: Normal.      Lumbar back: Normal.     Lymphadenopathy:     He has no cervical adenopathy.   Neurological: He is alert and oriented to person, place, and time. No cranial nerve deficit.   Right sided weakness    Skin: Skin is warm and dry. No rash noted. No pallor.   Psychiatric: He has a normal mood and affect. Thought content normal.         Medical Screening Exam   See Full Note    ED Course   Procedures  Labs Reviewed   COMPREHENSIVE METABOLIC PANEL - Abnormal       Result Value    Sodium 138       Potassium 4.2      Chloride 101      CO2 27      Anion Gap 14      Glucose 120 (*)     BUN 13      Creatinine 0.79      BUN/Creatinine Ratio 16      Calcium 8.9      Total Protein 5.9 (*)     Albumin 3.2 (*)     Globulin 2.7      A/G Ratio 1.2      Bilirubin, Total 0.4      Alk Phos 86      ALT 44      AST 27      eGFR 111     APTT - Abnormal    PTT 23.6 (*)    CBC WITH DIFFERENTIAL - Abnormal    WBC 11.23 (*)     RBC 4.34 (*)     Hemoglobin 12.5 (*)     Hematocrit 38.2 (*)     MCV 88.0      MCH 28.8      MCHC 32.7      RDW 15.0 (*)     Platelet Count 127 (*)     MPV 9.1 (*)     Neutrophils % 76.0 (*)     Lymphocytes % 15.4 (*)     Monocytes % 6.1 (*)     Eosinophils % 0.2 (*)     Basophils % 0.4      Immature Granulocytes % 1.9 (*)     nRBC, Auto 0.0      Neutrophils, Abs 8.55 (*)     Lymphocytes, Absolute 1.73      Monocytes, Absolute 0.68      Eosinophils, Absolute 0.02      Basophils, Absolute 0.04      Immature Granulocytes, Absolute 0.21 (*)     nRBC, Absolute 0.00      Diff Type Auto     NT-PRO NATRIURETIC PEPTIDE - Normal    ProBNP 53     MAGNESIUM - Normal    Magnesium 1.9     PROTIME-INR - Normal    PT 13.5      INR 1.04     TROPONIN I - Normal    Troponin I High Sensitivity 16.8     CBC W/ AUTO DIFFERENTIAL    Narrative:     The following orders were created for panel order CBC auto differential.  Procedure                               Abnormality         Status                     ---------                               -----------         ------                     CBC with Differential[[id number]]       Abnormal            Final result                 Please view results for these tests on the individual orders.   URINALYSIS, REFLEX TO URINE CULTURE    Color, UA Light-Yellow      Clarity, UA Clear      pH, UA 7.0      Leukocytes, UA Negative      Nitrites, UA Negative      Protein, UA Negative      Glucose, UA Normal      Ketones, UA Negative      Urobilinogen, UA Normal      Bilirubin, UA Negative       Blood, UA Negative      Specific Gravity, UA 1.012          ECG Results              EKG 12-lead (Final result)        Collection Time Result Time QRS Duration OHS QTC Calculation    11/17/24 08:57:06 11/19/24 04:32:26 108 399                     Final result by Interface, Lab In [facility] (11/19/24 04:32:32)                   Narrative:    Test Reason : R55,    Vent. Rate :  79 BPM     Atrial Rate :    BPM     P-R Int : 132 ms          QRS Dur : 108 ms      QT Int : 366 ms       P-R-T Axes :  60  60  48 degrees    QTcB Int : 399 ms    Sinus rhythm  Normal ECG    Confirmed by Randal Lao ([id number]) on 11/19/2024 4:32:25 AM    Referred By: AAAREFERRAL SELF           Confirmed By: Randal Lao                                  Imaging Results              CT Head Without Contrast (Final result)  Result time 11/17/24 10:03:07      Final result by Jasen Huang MD (11/17/24 10:03:07)                   Impression:      Redemonstrated expansile predominately hypodense mass centered in the left thalamus, with infiltration along the brainstem and hypothalamic region and adjoining posterior basal ganglia and temporal lobe structures. Patient with reported history of neoplasm at this location, per review of the electronic medical record.  Overall, relative to most recent available comparison imaging (head CT 09/26/2024), there appears to be decreased extent of the expansile hypoattenuation in this region, currently measuring approximately 40 x 20 mm transaxial dimension compared to 46 x 58 mm when measured in similar fashion previously. This may reflect evolving post treatment effect.  Slightly decreased rightward midline shift in the interval.    As before, there are small areas of increased attenuation within the lesion, which are felt relatively similar to prior could relate to posttreatment effect and/or intratumoral calcification, noting the and small volume recent hemorrhage difficult to entirely exclude.  Continued attention on follow-up would be recommended.  Comparison to outside imaging and continued surveillance with MRI of the brain performed without and with contrast would be recommended.      Electronically signed by: Jasen Huang  Date:    11/17/2024  Time:    10:03               Narrative:    EXAMINATION:  CT HEAD WITHOUT CONTRAST    CLINICAL HISTORY:  near syncope, cns mass;    TECHNIQUE:  Low dose axial images were obtained through the head.  Coronal and sagittal reformations were also performed. Contrast was not administered.    COMPARISON:  CT head performed 09/26/2024.    FINDINGS:  Blood: See below.    Parenchyma: Redemonstrated expansile predominately hypodense mass centered in the left thalamus, with infiltration along the brainstem and hypothalamic region and adjoining posterior basal ganglia and temporal lobe structures.  Patient with reported history of neoplasm at this location, per review of the electronic medical record.  Overall, relative to most recent available comparison imaging (head CT 09/26/2024), there appears to be decreased extent of the expansile hypoattenuation in this region, currently measuring approximately 40 x 20 mm transaxial dimension compared to 46 x 58 mm when measured in similar fashion previously.  This may reflect evolving post treatment effect.    As before, there are small areas of increased attenuation within the lesion, which are felt relatively similar to prior could relate to posttreatment effect and/or intratumoral calcification, noting the and small volume recent hemorrhage difficult to entirely exclude.  Continued attention on follow-up would be recommended.    There is persistent, relatively mild mass effect, with rightward midline shift measuring on the order of 2-3 mm, slightly improved compared to prior (previously on the order of 4 mm).    Ventricles/Extra-axial spaces: As above.  Mild prominence of the temporal horns of the lateral ventricles persists  without definite evidence of new transependymal CSF egress.    Vessels: Grossly unremarkable by unenhanced technique.    Orbits: Unremarkable.    Scalp: Unremarkable.    Skull: There are no depressed skull fractures or destructive bone lesions.    Sinuses and mastoids: Relatively minimal paranasal mucosal thickening.  Suspect osteoma right anterior ethmoid air cells.    Other findings: None                                       X-Ray Chest AP Portable (Final result)  Result time 11/17/24 10:16:08      Final result by Jasen Huang MD (11/17/24 10:16:08)                   Impression:      No convincing evidence of acute cardiopulmonary disease.      Electronically signed by: Jasen Huang  Date:    11/17/2024  Time:    10:16               Narrative:    EXAMINATION:  XR CHEST AP PORTABLE    CLINICAL HISTORY:  Syncope and collapse    TECHNIQUE:  Single frontal view of the chest was performed.    COMPARISON:  Chest radiograph performed 09/26/2024, 11:53 hours.    FINDINGS:  Monitoring leads overlie the chest.    Cardiac contours appear to be within normal limits.  Lungs essentially clear.  No definite pneumothorax or large volume pleural effusion.    No acute findings in the visualized abdomen.  Osseous and soft tissue structures appear without definite acute change.                                    X-Rays:   Independently Interpreted Readings:   Other Readings:  CT Head w/o Contrast:  Redemonstrated expansile predominately hypodense mass centered in the left thalamus, with infiltration along the brainstem and hypothalamic region and adjoining posterior basal ganglia and temporal lobe structures. Patient with reported history of neoplasm at this location, per review of the electronic medical record.  Overall, relative to most recent available comparison imaging (head CT 09/26/2024), there appears to be decreased extent of the expansile hypoattenuation in this region, currently measuring approximately 40 x 20 mm  transaxial dimension compared to 46 x 58 mm when measured in similar fashion previously. This may reflect evolving post treatment effect.  Slightly decreased rightward midline shift in the interval.     As before, there are small areas of increased attenuation within the lesion, which are felt relatively similar to prior could relate to posttreatment effect and/or intratumoral calcification, noting the and small volume recent hemorrhage difficult to entirely exclude. Continued attention on follow-up would be recommended.  Comparison to outside imaging and continued surveillance with MRI of the brain performed without and with contrast would be recommended.    Xray Chest AP Portable:  No convincing evidence of acute cardiopulmonary disease.    Medications - No data to display  Medical Decision Making  NEAR SYNCOPE.  CHEST PAIN.  ASSOCIATED WITH BOWEL MOVEMENT ON TOILET.     DDX:  CARDIAC VS NEUROCARDIOGENIC VS OTHER    OUTPATIENT FOLLOW UP.      Amount and/or Complexity of Data Reviewed  Labs: ordered. Decision-making details documented in ED Course.  Radiology: ordered. Decision-making details documented in ED Course.  ECG/medicine tests: ordered. Decision-making details documented in ED Course.              Attending Attestation:           Physician Attestation for Scribe:  Physician Attestation Statement for Scribe #1: I, Luis Manuel Bustillos MD, reviewed documentation, as scribed by Crystal Menchaca in my presence, and it is both accurate and complete.             ED Course as of 12/20/24 0058   Sun Nov 17, 2024   1411 CT Head w/o Contrast:  Redemonstrated expansile predominately hypodense mass centered in the left thalamus, with infiltration along the brainstem and hypothalamic region and adjoining posterior basal ganglia and temporal lobe structures. Patient with reported history of neoplasm at this location, per review of the electronic medical record.  Overall, relative to most recent available comparison imaging  (head CT 09/26/2024), there appears to be decreased extent of the expansile hypoattenuation in this region, currently measuring approximately 40 x 20 mm transaxial dimension compared to 46 x 58 mm when measured in similar fashion previously. This may reflect evolving post treatment effect.  Slightly decreased rightward midline shift in the interval.     As before, there are small areas of increased attenuation within the lesion, which are felt relatively similar to prior could relate to posttreatment effect and/or intratumoral calcification, noting the and small volume recent hemorrhage difficult to entirely exclude. Continued attention on follow-up would be recommended.  Comparison to outside imaging and continued surveillance with MRI of the brain performed without and with contrast would be recommended.   [LP]   1411 Xray Chest AP Portable:  No convincing evidence of acute cardiopulmonary disease.   [LP]      ED Course User Index  [LP] Crystla Menchaca                             Clinical Impression:   Final diagnoses:  [R55] Near syncope  [R07.9] Chest pain, unspecified type (Primary)        ED Disposition Condition    Discharge Stable          ED Prescriptions    None       Follow-up Information       Follow up With Specialties Details Why Contact Info    Rasta Carmichael MD Family Medicine  As needed [id number] [facility].  [facility] [id number] [phone]               Luis Manuel Bustillos MD  12/20/24 0058

## 2024-11-19 LAB
OHS QRS DURATION: 108 MS
OHS QTC CALCULATION: 399 MS

## 2024-12-16 ENCOUNTER — HOSPITAL ENCOUNTER (EMERGENCY)
Facility: HOSPITAL | Age: 44
Discharge: OTHER INSTITUTION | End: 2024-12-16
Attending: EMERGENCY MEDICINE
Payer: OTHER GOVERNMENT

## 2024-12-16 VITALS
BODY MASS INDEX: 38.6 KG/M2 | TEMPERATURE: 99 F | DIASTOLIC BLOOD PRESSURE: 58 MMHG | SYSTOLIC BLOOD PRESSURE: 100 MMHG | HEIGHT: 72 IN | RESPIRATION RATE: 18 BRPM | OXYGEN SATURATION: 96 % | HEART RATE: 70 BPM | WEIGHT: 285 LBS

## 2024-12-16 DIAGNOSIS — R90.89 MIDLINE SHIFT OF BRAIN: ICD-10-CM

## 2024-12-16 DIAGNOSIS — R41.82 ALTERED MENTAL STATUS: ICD-10-CM

## 2024-12-16 DIAGNOSIS — G93.89 BRAIN MASS: Primary | ICD-10-CM

## 2024-12-16 LAB
ALBUMIN SERPL BCP-MCNC: 3.1 G/DL (ref 3.5–5)
ALBUMIN/GLOB SERPL: 1 {RATIO}
ALP SERPL-CCNC: 82 U/L (ref 40–150)
ALT SERPL W P-5'-P-CCNC: 49 U/L
ANION GAP SERPL CALCULATED.3IONS-SCNC: 14 MMOL/L (ref 7–16)
AST SERPL W P-5'-P-CCNC: 26 U/L (ref 5–34)
BASOPHILS # BLD AUTO: 0.03 K/UL (ref 0–0.2)
BASOPHILS NFR BLD AUTO: 0.2 % (ref 0–1)
BILIRUB SERPL-MCNC: 0.6 MG/DL
BUN SERPL-MCNC: 20 MG/DL (ref 9–21)
BUN/CREAT SERPL: 24 (ref 6–20)
CALCIUM SERPL-MCNC: 8.5 MG/DL (ref 8.4–10.2)
CHLORIDE SERPL-SCNC: 102 MMOL/L (ref 98–107)
CO2 SERPL-SCNC: 22 MMOL/L (ref 22–29)
CREAT SERPL-MCNC: 0.84 MG/DL (ref 0.72–1.25)
D DIMER PPP FEU-MCNC: 0.38 ΜG/ML (ref 0–0.47)
DIFFERENTIAL METHOD BLD: ABNORMAL
EGFR (NO RACE VARIABLE) (RUSH/TITUS): 109 ML/MIN/1.73M2
EOSINOPHIL # BLD AUTO: 0.01 K/UL (ref 0–0.5)
EOSINOPHIL NFR BLD AUTO: 0.1 % (ref 1–4)
ERYTHROCYTE [DISTWIDTH] IN BLOOD BY AUTOMATED COUNT: 13.8 % (ref 11.5–14.5)
GLOBULIN SER-MCNC: 3 G/DL (ref 2–4)
GLUCOSE SERPL-MCNC: 187 MG/DL (ref 74–100)
HCT VFR BLD AUTO: 40.4 % (ref 40–54)
HGB BLD-MCNC: 13 G/DL (ref 13.5–18)
IMM GRANULOCYTES # BLD AUTO: 0.09 K/UL (ref 0–0.04)
IMM GRANULOCYTES NFR BLD: 0.7 % (ref 0–0.4)
LYMPHOCYTES # BLD AUTO: 0.85 K/UL (ref 1–4.8)
LYMPHOCYTES NFR BLD AUTO: 6.4 % (ref 27–41)
MCH RBC QN AUTO: 28.7 PG (ref 27–31)
MCHC RBC AUTO-ENTMCNC: 32.2 G/DL (ref 32–36)
MCV RBC AUTO: 89.2 FL (ref 80–96)
MONOCYTES # BLD AUTO: 0.57 K/UL (ref 0–0.8)
MONOCYTES NFR BLD AUTO: 4.3 % (ref 2–6)
MPC BLD CALC-MCNC: 9.2 FL (ref 9.4–12.4)
NEUTROPHILS # BLD AUTO: 11.69 K/UL (ref 1.8–7.7)
NEUTROPHILS NFR BLD AUTO: 88.3 % (ref 53–65)
NRBC # BLD AUTO: 0 X10E3/UL
NRBC, AUTO (.00): 0 %
NT-PROBNP SERPL-MCNC: 93 PG/ML (ref 1–125)
PLATELET # BLD AUTO: 188 K/UL (ref 150–400)
POTASSIUM SERPL-SCNC: 4.4 MMOL/L (ref 3.5–5.1)
PROT SERPL-MCNC: 6.1 G/DL (ref 6.4–8.3)
RBC # BLD AUTO: 4.53 M/UL (ref 4.6–6.2)
SODIUM SERPL-SCNC: 134 MMOL/L (ref 136–145)
TROPONIN I SERPL HS-MCNC: <2.7 NG/L
WBC # BLD AUTO: 13.24 K/UL (ref 4.5–11)

## 2024-12-16 PROCEDURE — 99285 EMERGENCY DEPT VISIT HI MDM: CPT | Mod: 25

## 2024-12-16 PROCEDURE — 85379 FIBRIN DEGRADATION QUANT: CPT | Performed by: EMERGENCY MEDICINE

## 2024-12-16 PROCEDURE — 36415 COLL VENOUS BLD VENIPUNCTURE: CPT | Performed by: EMERGENCY MEDICINE

## 2024-12-16 PROCEDURE — 84484 ASSAY OF TROPONIN QUANT: CPT | Performed by: EMERGENCY MEDICINE

## 2024-12-16 PROCEDURE — 25000003 PHARM REV CODE 250: Performed by: EMERGENCY MEDICINE

## 2024-12-16 PROCEDURE — 96374 THER/PROPH/DIAG INJ IV PUSH: CPT

## 2024-12-16 PROCEDURE — 96361 HYDRATE IV INFUSION ADD-ON: CPT

## 2024-12-16 PROCEDURE — 80053 COMPREHEN METABOLIC PANEL: CPT | Performed by: EMERGENCY MEDICINE

## 2024-12-16 PROCEDURE — 85025 COMPLETE CBC W/AUTO DIFF WBC: CPT | Performed by: EMERGENCY MEDICINE

## 2024-12-16 PROCEDURE — 83880 ASSAY OF NATRIURETIC PEPTIDE: CPT | Performed by: EMERGENCY MEDICINE

## 2024-12-16 PROCEDURE — 93005 ELECTROCARDIOGRAM TRACING: CPT

## 2024-12-16 PROCEDURE — 63600175 PHARM REV CODE 636 W HCPCS: Performed by: EMERGENCY MEDICINE

## 2024-12-16 PROCEDURE — 25500020 PHARM REV CODE 255: Performed by: EMERGENCY MEDICINE

## 2024-12-16 RX ORDER — IOPAMIDOL 755 MG/ML
100 INJECTION, SOLUTION INTRAVASCULAR
Status: COMPLETED | OUTPATIENT
Start: 2024-12-16 | End: 2024-12-16

## 2024-12-16 RX ORDER — METHYLPREDNISOLONE SOD SUCC 125 MG
125 VIAL (EA) INJECTION
Status: COMPLETED | OUTPATIENT
Start: 2024-12-16 | End: 2024-12-16

## 2024-12-16 RX ADMIN — METHYLPREDNISOLONE SODIUM SUCCINATE 125 MG: 125 INJECTION, POWDER, FOR SOLUTION INTRAMUSCULAR; INTRAVENOUS at 04:12

## 2024-12-16 RX ADMIN — SODIUM CHLORIDE 1000 ML: 9 INJECTION, SOLUTION INTRAVENOUS at 02:12

## 2024-12-16 RX ADMIN — IOPAMIDOL 100 ML: 755 INJECTION, SOLUTION INTRAVENOUS at 03:12

## 2024-12-16 RX ADMIN — APIXABAN 5 MG: 5 TABLET, FILM COATED ORAL at 07:12

## 2024-12-16 NOTE — ED PROVIDER NOTES
Encounter Date: 12/16/2024    SCRIBE #1 NOTE: I, Virgen Mare, am scribing for, and in the presence of,  Edward Muñoz MD.       History     Chief Complaint   Patient presents with    Hypertension     Presents to ED for complaints of elevated blood pressure and elevated heart rate at home.  Patient had near syncopal episode at home when standing up to use the restroom and has developed incontinence.  History of Glioblastoma     This 44 y.o. Male pt presents to the ED with c/o Hypertension. The pt's spouse reports the pt has not been himself for the past few days.The spouse said the pt had a near syncopal episode when standing up to use the restroom at home before arrival to the ED. He has never had his before with all of the ailments he has had in the past. Pt has very extensive Mhx  that consists of Cancer, PE (2x's), Glioblastoma (GBM), and Stroke. The pt is on a number of Mediations that he takes that include Eliquis 5 mg, Decadron 2 mg. Pt also is on 6 L Oxygen at [facility], the spouse reports. The spouse also reports the pt has physicians at [facility] for Neurology and Oncologist.  When examined pt has right sided deficit for past stroke, and fatigue. There are no other issues to report with this pt at this time.      The history is provided by the spouse and the patient. No  was used.     Review of patient's allergies indicates:  No Known Allergies  Past Medical History:   Diagnosis Date    Anxiety disorder, unspecified     GERD (gastroesophageal reflux disease)     Glioblastoma     Other pulmonary embolism without acute cor pulmonale     Palpitations     Rheumatoid arthritis     Sleep apnea      Past Surgical History:   Procedure Laterality Date    LEFT HEART CATHETERIZATION N/A 06/06/2023    Procedure: Left heart cath;  Surgeon: Fabio Lacy MD;  Location: [facility] CATH LAB;  Service: Cardiology;  Laterality: N/A;    LIPOMA RESECTION      LIPOMA RESECTION Left 10/16/2023    Procedure:  EXCISION, LEFT BACK LIPOMA;  Surgeon: Jose M Meier DO;  Location: [facility];  Service: General;  Laterality: Left;     Family History   Problem Relation Name Age of Onset    Lung cancer Mother      Allergies Brother       Social History     Tobacco Use    Smoking status: Never     Passive exposure: Never    Smokeless tobacco: Never   Substance Use Topics    Alcohol use: Never    Drug use: Never     Review of Systems   Constitutional:  Positive for fatigue. Negative for fever.   Respiratory:  Negative for cough, choking, chest tightness and shortness of breath.    Cardiovascular:  Negative for chest pain and leg swelling.   Gastrointestinal:  Negative for abdominal pain, diarrhea, nausea and vomiting.   Musculoskeletal:  Negative for neck pain and neck stiffness.   Neurological:  Positive for syncope.   Psychiatric/Behavioral:  Negative for behavioral problems and confusion.        Physical Exam     Initial Vitals   BP Pulse Resp Temp SpO2   12/16/24 1338 12/16/24 1338 12/16/24 1338 12/16/24 1338 12/16/24 1338   107/74 97 18 99.1 °F (37.3 °C) 96 %      MAP       --                Physical Exam    Nursing note and vitals reviewed.  Constitutional: He appears well-nourished.   Pt has right sided weakness from previous stroke.    HENT:   Head: Normocephalic and atraumatic. Mouth/Throat: Oropharynx is clear and moist.   Eyes: Pupils are equal, round, and reactive to light.   Neck: Neck supple.   Normal range of motion.  Cardiovascular:  Normal rate and regular rhythm.           Pulmonary/Chest: Effort normal and breath sounds normal.   Abdominal: Abdomen is soft. He exhibits no distension.   Musculoskeletal:         General: Normal range of motion.      Cervical back: Normal range of motion and neck supple.     Neurological: He is alert.   Skin: Skin is warm. Capillary refill takes less than 2 seconds.   Psychiatric: He has a normal mood and affect.         ED Course   Procedures  Labs Reviewed   COMPREHENSIVE  METABOLIC PANEL - Abnormal       Result Value    Sodium 134 (*)     Potassium 4.4      Chloride 102      CO2 22      Anion Gap 14      Glucose 187 (*)     BUN 20      Creatinine 0.84      BUN/Creatinine Ratio 24 (*)     Calcium 8.5      Total Protein 6.1 (*)     Albumin 3.1 (*)     Globulin 3.0      A/G Ratio 1.0      Bilirubin, Total 0.6      Alk Phos 82      ALT 49      AST 26      eGFR 109     CBC WITH DIFFERENTIAL - Abnormal    WBC 13.24 (*)     RBC 4.53 (*)     Hemoglobin 13.0 (*)     Hematocrit 40.4      MCV 89.2      MCH 28.7      MCHC 32.2      RDW 13.8      Platelet Count 188      MPV 9.2 (*)     Neutrophils % 88.3 (*)     Lymphocytes % 6.4 (*)     Monocytes % 4.3      Eosinophils % 0.1 (*)     Basophils % 0.2      Immature Granulocytes % 0.7 (*)     nRBC, Auto 0.0      Neutrophils, Abs 11.69 (*)     Lymphocytes, Absolute 0.85 (*)     Monocytes, Absolute 0.57      Eosinophils, Absolute 0.01      Basophils, Absolute 0.03      Immature Granulocytes, Absolute 0.09 (*)     nRBC, Absolute 0.00      Diff Type Auto     TROPONIN I - Normal    Troponin I High Sensitivity <2.7     NT-PRO NATRIURETIC PEPTIDE - Normal    ProBNP 93     CBC W/ AUTO DIFFERENTIAL    Narrative:     The following orders were created for panel order CBC auto differential.  Procedure                               Abnormality         Status                     ---------                               -----------         ------                     CBC with Differential[[id number]]       Abnormal            Final result                 Please view results for these tests on the individual orders.   D DIMER, QUANTITATIVE     EKG Readings: (Independently Interpreted)   Heart Rate: 88 bpm.   Normal sinus rhythm  Normal ECG  No previous ECGs available       Imaging Results               CTA Chest Non-Coronary (PE Studies) (Final result)  Result time 12/16/24 15:48:51      Final result by Get Villegas DO (12/16/24 15:48:51)                    Impression:      No PE    Multifocal ground-glass and nodular bronchocentric opacities right upper lobe concerning for bronchopneumonia clinical correlation and follow-up to resolution recommended.      Electronically signed by: eGt Villegas DO  Date:    12/16/2024  Time:    15:48               Narrative:    EXAMINATION:  CTA CHEST NON CORONARY (PE STUDIES)    CLINICAL HISTORY:  Pulmonary embolism (PE) suspected, high prob;    TECHNIQUE:  Low dose axial images, sagittal and coronal reformations were obtained from the thoracic inlet to the lung bases following the IV administration of 100 mL of Omnipaque 350.  Contrast timing was optimized to evaluate the pulmonary arteries.  MIP images were performed.    COMPARISON:  Chest x-ray 12/16/2024 and CTA 09/18/2023    FINDINGS:  Patchy ground-glass and soft tissue nodular opacities within the anterior and apical segments of the right upper lobe in somewhat bronchocentric distribution concerning for underlying bronchopneumonia.  There is small question secretion along the right lateral aspect of the trachea.  There is no pleural effusion or pneumothorax.  No intraluminal filling defect within the central or proximal segmental pulmonary arteries to suggest pulmonary embolism.    No hilar mediastinal mass or adenopathy    Small hiatal hernia.  No aneurysmal dilatation of the thoracic aorta. This report was flagged in Epic as abnormal.    No evidence for acute fracture or traumatic subluxation visualized spine.                                       X-Ray Chest AP Portable (In process)                       CT Head Without Contrast (Final result)  Result time 12/16/24 15:34:35      Final result by Get Villegas DO (12/16/24 15:34:35)                   Impression:      Heterogeneous hypodense lesion within the left basal ganglia overall increased in size from prior CT.  Please note there is prior interval laser ablation therapy according to prior MRI report although MRI  imaging is not available for comparison.  Increased sized region of hypoattenuation may be related to increased size lesion or post treatment change clinical correlation, comparison with prior imaging and further evaluation with MRI advised    Mass effect with slight increased compression of the 3rd ventricle with relatively stable configuration lateral ventricles without evidence for acute hydrocephalus.    There is no evidence for acute intracranial hemorrhage.      Electronically signed by: Get Villegas DO  Date:    12/16/2024  Time:    15:34               Narrative:    EXAMINATION:  CT HEAD WITHOUT CONTRAST    CLINICAL HISTORY:  Mental status change, unknown cause;    TECHNIQUE:  Multiple sequential 5 mm axial images of the head without contrast.  Coronal and sagittal reformatted imaging from the axial acquisition.    COMPARISON:  CT head 11/17/2024 and MRI brain report 11/19/2024    FINDINGS:  There is continue though increased sized heterogeneous region of predominant hypoattenuation centered in the left basal ganglia extend to the thalamus this measures approximately 4.1 x 3.8 cm in AP by TV dimensions previously measuring 4.0 x 2.8.  In light of MRI report this may represent component of post treatment change however there is increased mass effect the ventricles with rightward midline shift measuring approximately 3 mm at the level of 3rd ventricle previously with only slight rightward bowing of the 3rd ventricle and 1-2 mm of midline shift.    No evidence for acute intracranial hemorrhage.    Allowing for slight increased mass effect on the 3rd ventricle no significant increased sized lateral ventricles to suggest developing hydrocephalus.  Clinical correlation and comparison with prior MR imaging and follow-up advised to assess for possible interval lesion growth.  This report was flagged in Epic as abnormal.                                    X-Rays:   Independently Interpreted Readings:   Other  Readings:  Details      Reading Physician Reading Date Result Priority  Get Villegas DO  [phone]  [phone] 12/16/2024 STAT    Narrative & Impression  EXAMINATION:  CTA CHEST NON CORONARY (PE STUDIES)     CLINICAL HISTORY:  Pulmonary embolism (PE) suspected, high prob;     TECHNIQUE:  Low dose axial images, sagittal and coronal reformations were obtained from the thoracic inlet to the lung bases following the IV administration of 100 mL of Omnipaque 350.  Contrast timing was optimized to evaluate the pulmonary arteries.  MIP images were performed.     COMPARISON:  Chest x-ray 12/16/2024 and CTA 09/18/2023     FINDINGS:  Patchy ground-glass and soft tissue nodular opacities within the anterior and apical segments of the right upper lobe in somewhat bronchocentric distribution concerning for underlying bronchopneumonia.  There is small question secretion along the right lateral aspect of the trachea.  There is no pleural effusion or pneumothorax.  No intraluminal filling defect within the central or proximal segmental pulmonary arteries to suggest pulmonary embolism.     No hilar mediastinal mass or adenopathy     Small hiatal hernia.  No aneurysmal dilatation of the thoracic aorta. This report was flagged in Epic as abnormal.     No evidence for acute fracture or traumatic subluxation visualized spine.     Impression:     No PE     Multifocal ground-glass and nodular bronchocentric opacities right upper lobe concerning for bronchopneumonia clinical correlation and follow-up to resolution recommended.        Electronically signed by:Get Villegas DO  Date:                                            12/16/2024  Time:                                           15:48      Exam Ended: 12/16/24 15:28 CST Last Resulted: 12/16/24 15:48 CST   Details      Reading Physician Reading Date Result Priority  Get Villegas DO  [phone]  [phone] 12/16/2024 STAT    Narrative & Impression  EXAMINATION:  CT HEAD  WITHOUT CONTRAST     CLINICAL HISTORY:  Mental status change, unknown cause;     TECHNIQUE:  Multiple sequential 5 mm axial images of the head without contrast.  Coronal and sagittal reformatted imaging from the axial acquisition.     COMPARISON:  CT head 11/17/2024 and MRI brain report 11/19/2024     FINDINGS:  There is continue though increased sized heterogeneous region of predominant hypoattenuation centered in the left basal ganglia extend to the thalamus this measures approximately 4.1 x 3.8 cm in AP by TV dimensions previously measuring 4.0 x 2.8.  In light of MRI report this may represent component of post treatment change however there is increased mass effect the ventricles with rightward midline shift measuring approximately 3 mm at the level of 3rd ventricle previously with only slight rightward bowing of the 3rd ventricle and 1-2 mm of midline shift.     No evidence for acute intracranial hemorrhage.     Allowing for slight increased mass effect on the 3rd ventricle no significant increased sized lateral ventricles to suggest developing hydrocephalus.  Clinical correlation and comparison with prior MR imaging and follow-up advised to assess for possible interval lesion growth.  This report was flagged in Epic as abnormal.     Impression:     Heterogeneous hypodense lesion within the left basal ganglia overall increased in size from prior CT.  Please note there is prior interval laser ablation therapy according to prior MRI report although MRI imaging is not available for comparison.  Increased sized region of hypoattenuation may be related to increased size lesion or post treatment change clinical correlation, comparison with prior imaging and further evaluation with MRI advised     Mass effect with slight increased compression of the 3rd ventricle with relatively stable configuration lateral ventricles without evidence for acute hydrocephalus.     There is no evidence for acute intracranial  hemorrhage.        Electronically signed by:Get Bakari,   Date:                                            12/16/2024  Time:                                           15:34      Exam Ended: 12/16/24 15:27 CST Last Resulted: 12/16/24 15:34 CST            Medications   sodium chloride 0.9% bolus 1,000 mL 1,000 mL (1,000 mLs Intravenous New Bag 12/16/24 1451)   iopamidoL (ISOVUE-370) injection 100 mL (100 mLs Intravenous Given 12/16/24 1529)   methylPREDNISolone sodium succinate injection 125 mg (125 mg Intravenous Given 12/16/24 1617)     Medical Decision Making            Attending Attestation:           Physician Attestation for Scribe:  Physician Attestation Statement for Scribe #1: I, Edward Muoñz MD, reviewed documentation, as scribed by Virgen Garvey in my presence, and it is both accurate and complete.             ED Course as of 12/16/24 1646   Mon Dec 16, 2024   1420 Medical decision-making:  Differential diagnosis includes altered mental status, brain tumor, cerebral edema, hypotension, UTI, pneumonia, electrolyte abnormality.  All testing ordered and interpreted by me. [BB]   1550 CT brain shows increased size of previously noted mass with 3 mm of midline shift. [BB]   1551 CT chest by my interpretation shows no large pulmonary embolus.  Radiology report shows no pulmonary embolism. [BB]   1552 CBC shows mildly elevated white blood count of 13.2 otherwise unremarkable.  CMP shows hyperglycemia, otherwise unremarkable. [BB]   1553 12/16/24 1551  CTA Chest Non-Coronary (PE Studies)  Performed: 12/16/24 1528  Final         Impression: No PE Multifocal ground-glass and nodular bronchocentric opacities right upper lobe concerning for bronchopneumonia clinical correlation and follow-up to resolution recommended. Electronically sign...    12/16/24 1536  CT Head Without Contrast  Performed: 12/16/24 1527  Final         Impression: Heterogeneous hypodense lesion within the left basal ganglia overall increased  in size from prior CT. Please note there is prior interval laser ablation therapy according to prior MRI report although MRI       [CM]   1553 Because of CT brain findings I made decision to transfer patient.  Hopefully patient can be transferred to [facility] in [place] where he has been previously treated. [BB]   1601 Troponin is normal.  Pro BNP is normal. [BB]   1601 Awaiting transfer. [BB]   1640 Patient was accepted in transfer to [facility], accepting physician is Dr. Manning. [BB]      ED Course User Index  [BB] Edward Muñoz MD  [CM] Virgen Garvey                           Clinical Impression:  Final diagnoses:  [R41.82] Altered mental status  [G93.89] Brain mass (Primary)  [R90.89] Midline shift of brain          ED Disposition Condition    Transfer to Another Facility Stable                Edward Muñoz MD  12/16/24 [id number]

## 2025-02-04 ENCOUNTER — OFFICE VISIT (OUTPATIENT)
Dept: NEUROLOGY | Facility: CLINIC | Age: 45
End: 2025-02-04
Payer: OTHER GOVERNMENT

## 2025-02-04 VITALS
OXYGEN SATURATION: 95 % | RESPIRATION RATE: 18 BRPM | BODY MASS INDEX: 39.63 KG/M2 | HEART RATE: 82 BPM | WEIGHT: 292.63 LBS | SYSTOLIC BLOOD PRESSURE: 118 MMHG | HEIGHT: 72 IN | DIASTOLIC BLOOD PRESSURE: 77 MMHG

## 2025-02-04 DIAGNOSIS — G81.91 RIGHT HEMIPARESIS: ICD-10-CM

## 2025-02-04 DIAGNOSIS — I63.9 CEREBRAL INFARCTION, UNSPECIFIED MECHANISM: Primary | ICD-10-CM

## 2025-02-04 DIAGNOSIS — C71.9 GLIOBLASTOMA: ICD-10-CM

## 2025-02-04 PROCEDURE — 99999 PR PBB SHADOW E&M-EST. PATIENT-LVL V: CPT | Mod: PBBFAC,,, | Performed by: NURSE PRACTITIONER

## 2025-02-04 PROCEDURE — 99215 OFFICE O/P EST HI 40 MIN: CPT | Mod: PBBFAC | Performed by: NURSE PRACTITIONER

## 2025-02-04 PROCEDURE — 99214 OFFICE O/P EST MOD 30 MIN: CPT | Mod: S$PBB,,, | Performed by: NURSE PRACTITIONER

## 2025-02-04 RX ORDER — METFORMIN HYDROCHLORIDE 500 MG/1
500 TABLET ORAL
COMMUNITY
Start: 2024-12-19

## 2025-02-04 RX ORDER — METOPROLOL SUCCINATE 100 MG/1
1 TABLET, EXTENDED RELEASE ORAL DAILY
COMMUNITY
Start: 2024-11-22

## 2025-02-04 RX ORDER — LEVETIRACETAM 500 MG/1
TABLET ORAL
COMMUNITY
Start: 2025-01-17

## 2025-02-04 RX ORDER — DEXAMETHASONE 2 MG/1
TABLET ORAL
COMMUNITY
Start: 2025-01-23

## 2025-02-04 RX ORDER — DEXAMETHASONE 2 MG/1
1 TABLET ORAL
COMMUNITY
Start: 2025-01-17

## 2025-02-04 NOTE — PATIENT INSTRUCTIONS
Continue current keppra 500mg twice daily  Keep follow-up with [facility] neurology as scheduled  Notify clinic if any acute needs or concerns    Stroke risk modifiers:    1. Good sleep habits, recommend at least 7 hours total sleep daily  2. Continue management of blood pressure and cholesterol including medications, exercise, and good eating habits  3. Exercise as much as possible, recommend 5 days of the week for 30 minutes each day  4. Avoid smoking and alcohol  5. Go to the nearest emergency department immediately if any new or worsening weakness, speech difficulty, or numbness

## 2025-02-04 NOTE — PROGRESS NOTES
Subjective:       Patient ID: Jigar Larson is a 44 y.o. male     Chief Complaint:    Chief Complaint   Patient presents with    Follow-up        Allergies:  Patient has no known allergies.    Current Medications:    Outpatient Encounter Medications as of 2/4/2025   Medication Sig Dispense Refill    apixaban (ELIQUIS) 5 mg Tab Take 5 mg by mouth.      cetirizine (ZYRTEC) 10 MG tablet Take 10 mg by mouth every evening.      dexAMETHasone (DECADRON) 2 MG tablet Take 1 tablet by mouth with breakfast.      dexAMETHasone (DECADRON) 2 MG tablet       levETIRAcetam (KEPPRA) 500 MG Tab Take by mouth.      metFORMIN (GLUCOPHAGE) 500 MG tablet Take 500 mg by mouth.      metoprolol succinate (TOPROL-XL) 100 MG 24 hr tablet Take 1 tablet by mouth once daily.      pantoprazole (PROTONIX) 40 MG tablet Take 1 tablet by mouth once daily.      dapsone 100 MG Tab Take 100 mg by mouth once daily.       No facility-administered encounter medications on file as of 2/4/2025.       History of Present Illness  45 y/o male following in neurology for history of glioblastoma and CVA    Currently treated after he was found in July of 2024 to have glioblastoma, left basal ganglia/temporal stem with left thalamic ischemic stroke, surgery per Dr. Rob at [facility] 8/6/24 with continued chemotherapy and radiation therapy since that time.    right hemiparesis, 3/5 upper and lower ext.  5/5 in the LUE and LLE.  He has right facial deviation, speech only slightly slurred, no expressive aphasia appreciated.  He is quite interactive and in good spirits.  He is getting PT at home through [facility] and feels symptoms are improving as far as mobility is concerned.  He has reported lower peripheral vision complications.  Denies recent falls, does not report headaches.  No seizures clearly reported, however since last visit with me had several emergency department evaluations with reported symptoms of acute confusion, possibly urinary incontinence.  His  last admit at [facility] in December of 2024 neurology recommended starting back on keppra with current dosing 1000mg bid.    Denies any current needs from neurology but we will continue to monitor and follow him up in 4 months, as his primary neurology is at [facility]           Review of Systems  Review of Systems   Constitutional:  Negative for diaphoresis and fever.   HENT:  Negative for congestion, hearing loss and tinnitus.    Eyes:  Positive for blurred vision. Negative for double vision, photophobia, discharge and redness.   Respiratory:  Negative for cough and shortness of breath.    Cardiovascular:  Negative for chest pain.   Gastrointestinal:  Negative for abdominal pain, nausea and vomiting.   Musculoskeletal:  Negative for back pain, joint pain, myalgias and neck pain.   Skin:  Negative for itching and rash.   Neurological:  Positive for tingling, sensory change, speech change, focal weakness and weakness. Negative for dizziness, tremors, seizures, loss of consciousness and headaches.   Psychiatric/Behavioral:  Negative for depression, hallucinations and memory loss. The patient does not have insomnia.    All other systems reviewed and are negative.     Objective:     NEUROLOGICAL EXAMINATION:     MENTAL STATUS   Oriented to person, place, and time.   Attention: normal. Concentration: normal.   Speech: speech is normal   Level of consciousness: alert  Knowledge: good and consistent with education.   Normal comprehension.     CRANIAL NERVES     CN II   Visual fields full to confrontation.   Visual acuity: normal  Right visual field deficit: lower temporal quadrant(s)  Left visual field deficit: none     CN III, IV, VI   Pupils are equal, round, and reactive to light.  Extraocular motions are normal.   Right pupil: Size: 3 mm. Shape: regular. Reactivity: brisk. Consensual response: intact. Accommodation: intact.   Left pupil: Size: 3 mm. Shape: regular. Reactivity: brisk. Consensual response: intact. Accommodation:  intact.   CN III: no CN III palsy  CN VI: no CN VI palsy  Nystagmus: none   Diplopia: none  Upgaze: normal  Downgaze: normal  Conjugate gaze: present  Vestibulo-ocular reflex: present    CN V   Facial sensation intact.   Right facial sensation deficit: cheeks  Left facial sensation deficit: none  Right corneal reflex: normal  Left corneal reflex: normal    CN VII   Facial expression full, symmetric.   Right facial weakness: central  Left facial weakness: none  Left taste: normal    CN VIII   CN VIII normal.   Hearing: intact    CN IX, X   CN IX normal.   CN X normal.   Palate: symmetric    CN XI   CN XI normal.   Right sternocleidomastoid strength: normal  Left sternocleidomastoid strength: normal  Right trapezius strength: normal  Left trapezius strength: normal    CN XII   CN XII normal.   Tongue: not atrophic  Fasciculations: absent  Tongue deviation: none    MOTOR EXAM   Muscle bulk: normal  Overall muscle tone: normal  Right arm tone: decreased  Left arm tone: normal  Left arm pronator drift: absent  Right leg tone: decreased  Left leg tone: normal    Strength   Right neck flexion: 5/5  Left neck flexion: 5/5  Right neck extension: 5/5  Left neck extension: 5/5  Right deltoid: 1/5  Left deltoid: 5/5  Right biceps: 1/5  Left biceps: 5/5  Right triceps: 1/5  Left triceps: 5/5  Right wrist flexion: 1/5  Left wrist flexion: 5/5  Right wrist extension: 1/5  Left wrist extension: 5/5  Right interossei: 1/5  Left interossei: 5/5  Right iliopsoas: 1/5  Left iliopsoas: 5/5  Right quadriceps: 1/5  Left quadriceps: 5/5  Right hamstrin/5  Left hamstrin/5  Right anterior tibial: 1/5  Left anterior tibial: 5/5  Right posterior tibial: 1/5  Left posterior tibial: 5/5  Right gastroc: 1/5  Left gastroc: 5/5    REFLEXES     Reflexes   Right brachioradialis: 2+  Left brachioradialis: 2+  Right biceps: 2+  Left biceps: 2+  Right triceps: 2+  Left triceps: 2+  Right patellar: 2+  Left patellar: 2+  Right achilles: 2+  Left  achilles: 2+  Right : 1+  Left : 2+    SENSORY EXAM   Light touch normal.   Right arm light touch: normal  Left arm light touch: normal  Right leg light touch: normal  Left leg light touch: normal  Vibration normal.   Right arm vibration: normal  Left arm vibration: normal  Right leg vibration: normal  Left leg vibration: normal  Proprioception normal.   Right arm proprioception: normal  Left arm proprioception: normal  Right leg proprioception: normal  Left leg proprioception: normal  Pinprick normal.   Right arm pinprick: normal  Left arm pinprick: normal  Right leg pinprick: normal  Left leg pinprick: normal  Graphesthesia: normal  Romberg: negative  Stereognosis: normal    GAIT AND COORDINATION      Coordination   Finger to nose coordination: abnormal  Heel to shin coordination: abnormal  Tandem walking coordination: abnormal    Tremor   Resting tremor: absent  Intention tremor: absent  Action tremor: absent       Using wheelchair for ambulation in clinic        Physical Exam  Vitals and nursing note reviewed.   Constitutional:       Appearance: Normal appearance.   HENT:      Head: Normocephalic.   Eyes:      Extraocular Movements: EOM normal.      Pupils: Pupils are equal, round, and reactive to light.   Pulmonary:      Effort: Pulmonary effort is normal.   Musculoskeletal:         General: Normal range of motion.      Cervical back: Normal range of motion and neck supple.   Skin:     General: Skin is warm and dry.   Neurological:      General: No focal deficit present.      Mental Status: He is alert and oriented to person, place, and time.      Cranial Nerves: Cranial nerve deficit present.      Sensory: No sensory deficit.      Motor: Weakness present.      Coordination: Coordination abnormal. Finger-Nose-Finger Test abnormal and Heel to Shin Test abnormal. Romberg Test normal.      Gait: Gait abnormal and tandem walk abnormal.      Deep Tendon Reflexes: Reflexes normal.      Reflex Scores:        Tricep reflexes are 2+ on the right side and 2+ on the left side.       Bicep reflexes are 2+ on the right side and 2+ on the left side.       Brachioradialis reflexes are 2+ on the right side and 2+ on the left side.       Patellar reflexes are 2+ on the right side and 2+ on the left side.       Achilles reflexes are 2+ on the right side and 2+ on the left side.  Psychiatric:         Mood and Affect: Mood normal.         Speech: Speech normal.         Behavior: Behavior normal.          Assessment:     Problem List Items Addressed This Visit    None         Primary Diagnosis and ICD10  No primary diagnosis found.    Plan:     There are no Patient Instructions on file for this visit.    There are no discontinued medications.    Requested Prescriptions      No prescriptions requested or ordered in this encounter       No orders of the defined types were placed in this encounter.

## 2025-05-12 ENCOUNTER — TELEPHONE (OUTPATIENT)
Dept: GASTROENTEROLOGY | Facility: CLINIC | Age: 45
End: 2025-05-12
Payer: OTHER GOVERNMENT

## 2025-05-14 ENCOUNTER — OFFICE VISIT (OUTPATIENT)
Dept: GASTROENTEROLOGY | Facility: CLINIC | Age: 45
End: 2025-05-14
Payer: OTHER GOVERNMENT

## 2025-05-14 VITALS
BODY MASS INDEX: 41.31 KG/M2 | WEIGHT: 305 LBS | HEART RATE: 88 BPM | DIASTOLIC BLOOD PRESSURE: 88 MMHG | HEIGHT: 72 IN | OXYGEN SATURATION: 96 % | SYSTOLIC BLOOD PRESSURE: 124 MMHG

## 2025-05-14 DIAGNOSIS — K76.9 LIVER LESION: ICD-10-CM

## 2025-05-14 DIAGNOSIS — K86.2 PANCREATIC CYST: ICD-10-CM

## 2025-05-14 DIAGNOSIS — C71.9 GLIOBLASTOMA: Primary | ICD-10-CM

## 2025-05-14 DIAGNOSIS — K64.1 GRADE II HEMORRHOIDS: ICD-10-CM

## 2025-05-14 PROCEDURE — 99214 OFFICE O/P EST MOD 30 MIN: CPT | Mod: S$PBB,,,

## 2025-05-14 PROCEDURE — 99999 PR PBB SHADOW E&M-EST. PATIENT-LVL III: CPT | Mod: PBBFAC,,,

## 2025-05-14 PROCEDURE — 99213 OFFICE O/P EST LOW 20 MIN: CPT | Mod: PBBFAC

## 2025-05-14 NOTE — PROGRESS NOTES
CC:  Follow-up internal hemorrhoids, pancreatic cyst    HPI 45 y.o. white male presents today for follow-up hemorrhoids and pancreatic cysts.  Patient denies any hematochezia or melena.  They are doing MiraLax twice a day for keeping his stools softer inpatient states that he is not having any trouble with his bowel movements or any pain with his hemorrhoids at this visit.  He did have a GI bleed and was [facility] Dr. Mendoza for 1025 finding of diverticulosis and 1 polyp.  GI bleed likely from possible diverticulum versus internal hemorrhoids.  One polyps found pathology report shows hyperplastic polyp.  3/10/25 Patient has recently seen Dr. Alexander at [facility] 03/10/2025 for follow-up on pancreatic cyst. Patient was seen in 2019 for the similar lesion. EUS in 02/2019 with fluid studies consistent with serous cystadenoma. Patient now with a diagnosis of glioblastoma and undergoing treatment. Imaging with CT scan and MRI with the finding of 7 mm cystic lesion (unchanged) in the head of pancreas concerning for IPMN and another 2.4 cm lesion in the pancreatic body (unchanged) favored to be serous cystadenoma versus side-branch IPMN. Also, imaging with CT revealed multiple lesions in the liver turning for hemangioma/metastasis. Follow up imaging with MRI favoring hemangiomas but could not be ruled out metastasis completely.  Patient denies any symptoms of diarrhea or abdominal pain. Lab work from 11/2024 with normal LFT. Lipase levels normal in 08/2024.   Per Dr. Alexander's not since there is no concern for pancreatitis, no change in the size of lesion and no change in the size of pancreatic duct, no endoscopic intervention is needed as per the guidelines. We recommend obtaining repeat MRI of the abdomen in 1 year to ensure stability of the size   Interval  HPI 44 y.o. white male presents today for complaint of hemorrhoids.  Patient has a history of a glioblastoma in has  finished his chemo and radiation as of 10/25/2024.  He has a return appointment to see Oncology November 18th and we will plan to do scans at the time patient does have a lesion in his pancreas and seen Dr. Alexander, Dr. Alexander instructed patient and family that he would not do anything as far as EUS and pancreas during his chemo and radiation treatment that he is to return to see him post therapy completion.  Instruct patient in family that he would need to tell Oncology to get him back in to see Dr. Alexander so the pancreas can be looked at.  MRI of the abdomen in the pancreas shows 2.7 cm nonenhancing microcystic appearing lesion in the pancreatic body, likely representing a side branch IPMN. Additional small 6 mm nonenhancing lesion in the head of the pancreas, which appears to connect to the main pancreatic duct and may represent a small IPMN.   Patient did have an MRI of the abdomen that showed a lesion in the liver, additional T2 hyperintense lesion measuring up to 2.3 cm with mild nodular peripheral enhancement in the right hepatic lobe likely representing atypical hemangiomas.  Discuss this patient with Dr. Manzano, plan for this patient will be to treat with Anusol for hemorrhoids and follow-up with Dr. Alexander asa.  Lab patient return in six-months follow-up.  Colonoscopy 9/19/23 polyps found scattered diverticulosis grade 2 hemorrhoids recommended repeat 7 years.  Patient denies any hematochezia melena.  Patient denies any nausea vomiting or dysphagia  Medical records reviewed. Additional history supplemented by nursing.     Past Medical History:   Diagnosis Date    Anxiety disorder, unspecified     GERD (gastroesophageal reflux disease)     Glioblastoma     Other pulmonary embolism without acute cor pulmonale     Palpitations     Rheumatoid arthritis     Sleep apnea        Review of Systems   Gastrointestinal:  Negative for abdominal pain, blood in stool, constipation, diarrhea, heartburn, melena, nausea and  vomiting.   All other systems reviewed and are negative.       Physical Exam  Vitals reviewed.   Constitutional:       Appearance: Normal appearance. He is obese. He is ill-appearing.   Abdominal:      General: Bowel sounds are normal. There is no distension.      Palpations: Abdomen is soft.      Tenderness: There is no abdominal tenderness.   Skin:     General: Skin is warm and dry.      Coloration: Skin is not jaundiced.   Neurological:      Mental Status: He is alert and oriented to person, place, and time.      Comments: Patient has a glioblastoma   Psychiatric:         Mood and Affect: Mood normal.         Behavior: Behavior normal.          Anesthesia/Surgery risks, benefits and alternative options discussed and understood by patient/family.   Labs:  Lab Results   Component Value Date    WBC 7.27 02/03/2025    HGB 13.1 (L) 02/03/2025    HCT 40.2 02/03/2025    MCV 87.6 02/03/2025     02/03/2025       CMP  Sodium   Date Value Ref Range Status   02/03/2025 139 136 - 145 mmol/L Final   08/10/2024 133 (L) 136 - 145 mmol/L Final     Potassium   Date Value Ref Range Status   02/03/2025 4.0 3.5 - 5.1 mmol/L Final     Chloride   Date Value Ref Range Status   02/03/2025 105 98 - 107 mmol/L Final     CO2   Date Value Ref Range Status   02/03/2025 20 (L) 22 - 29 mmol/L Final     Glucose   Date Value Ref Range Status   02/03/2025 166 (H) 74 - 100 mg/dL Final     BUN   Date Value Ref Range Status   02/03/2025 16 9 - 21 mg/dL Final     Creatinine   Date Value Ref Range Status   02/03/2025 0.93 0.72 - 1.25 mg/dL Final     Calcium   Date Value Ref Range Status   02/03/2025 8.4 8.4 - 10.2 mg/dL Final     Total Protein   Date Value Ref Range Status   02/03/2025 5.7 (L) 6.4 - 8.3 g/dL Final     Albumin   Date Value Ref Range Status   02/03/2025 3.3 (L) 3.5 - 5.0 g/dL Final     Bilirubin, Total   Date Value Ref Range Status   02/03/2025 0.4 <=1.5 mg/dL Final     Alk Phos   Date Value Ref Range Status   02/03/2025 70 40 -  150 U/L Final     AST   Date Value Ref Range Status   02/03/2025 17 5 - 34 U/L Final     ALT   Date Value Ref Range Status   02/03/2025 32 <=55 U/L Final     Anion Gap   Date Value Ref Range Status   02/03/2025 18 (H) 7 - 16 mmol/L Final       Imaging:  Impression: No definite acute process    There are 3 enhancing nodular lesions in the liver which could  represent flash filling hemangiomata. The patient had an MRI abdomen  with and without IV contrast on August 13, 2024 at [facility] which addresses  these lesions; however, those images are not available for direct  visual comparison.    Diverticulosis of the colon without araceli diverticulitis    Chronic cystic lesion in the pancreas which could represent side  branch IPMN  Narrative    History: Abdominal pain. GI bleed. History of glioblastoma    Date: 4/7/2025    Study: CT ABDOMEN PELVIS W CONTRAST    Comparison exam: No previous abdominal CT is currently available for  comparison    Technique: Spiral CT sections were obtained from the lung bases to the  pubic symphysis following  100 mL Isovue 370 IV.  Oral contrast was  not given.    CT abdomen: Partially visualized lung bases are clear.    There is no evidence of pneumoperitoneum.    There are at least 3 small nodular areas of enhancement in the liver,  the largest of which measures 16 mm and is located superiorly and  medially in hepatic segment 7. These are nonspecific but could  represent flash filling hemangiomata. There is hepatic steatosis.    Bile ducts and contracted gallbladder are unremarkable.    There is no pancreatic ductal dilatation. There is a 22 mm cystic area  in the pancreas at the junction of the neck and body. This was noted  to be a complex cyst on the abdominal ultrasound dated March 15, 2023  and measured 27 x 24 x 14 mm by ultrasound at that time.    Kidneys and adrenal glands appear normal. There is no hydronephrosis.    Spleen appears normal.    Stomach is generally unremarkable but is not  well-distended. There is  no evidence of large bowel or small bowel obstruction. Appendix  appears normal. There is diverticulosis of the colon without araceli  diverticulitis. There is mild to moderate retained stool in the colon.  There is no gross arterial extravasation of contrast within bowel on  this single phase study.    There is no retroperitoneal lymphadenopathy by short axis diameter  criteria.    CT pelvis: Urinary bladder is mildly distended. There is no soft  tissue mass in the pelvis. There is a small fat-containing inguinal  hernia bilaterally.    There is mild degenerative disc disease of the lumbar spine.    The CT exam was performed using one or more of the following dose  reduction techniques: Automated exposure control,  adjustment of the  mA and/or kV according to patient size, or use of iterative  reconstruction technique.    Independently reviewed    Assessment: Jigar Larson 46 yo WM here with:  Internal hemorrhoids  History of polyps  Pancreatic cyst  Liver lesions  Glioblastoma    Plan:  Continues using MiraLax 1 cap full up to twice a day to keep stool soft for internal hemorrhoids  We will follow-up in 1 year with MRI MRCP of pancreatic cyst in liver lesions  Patient to see oncologist and have a MRI of the brain in a couple of months  Follow-up in March for MRI to father liver and pancreatic lesion    I spent a total of 30 minutes on the day of the visit.This includes face to face time and non-face to face time preparing to see the patient (eg, review of tests), obtaining and/or reviewing separately obtained history, documenting clinical information in the electronic or other health record, independently interpreting results and communicating results to the patient/family/caregiver, or care coordinator.     Shameka Morales, FNP [facility]

## 2025-05-15 PROBLEM — K76.9 LIVER LESION: Status: ACTIVE | Noted: 2025-05-15

## 2025-05-15 PROBLEM — K86.2 PANCREATIC CYST: Status: ACTIVE | Noted: 2025-05-15

## (undated) DEVICE — KIT GLIDESHEATH SLEND 6FR 10CM

## (undated) DEVICE — STAPLER SKIN SUBCUTICULAR

## (undated) DEVICE — SPONGE COTTON TRAY 4X4IN

## (undated) DEVICE — CONTRAST ISOVUE 370 100ML

## (undated) DEVICE — GLOVE 7.0 PROTEXIS PI BLUE

## (undated) DEVICE — KIT BASIC RUSH

## (undated) DEVICE — KIT IV START WITH PREVANTICS

## (undated) DEVICE — CATH DIAG 6F FL3.5 100CM

## (undated) DEVICE — GLOVE PROTEXIS PI SYN SURG 6.5

## (undated) DEVICE — GUIDEWIRE INQWIRE SE 3MM JTIP

## (undated) DEVICE — GLOVE PROTEXIS PI CRM 7

## (undated) DEVICE — GLOVE 8.5 PROTEXIS PI BLUE

## (undated) DEVICE — DECANTER FLUID TRNSF WHITE 9IN

## (undated) DEVICE — INTRODUCER KIT MICRO 4FR

## (undated) DEVICE — GLOVE PROTEXIS PI SYN SURG 8.0

## (undated) DEVICE — OXISENSOR ADULT DIGIT N/S

## (undated) DEVICE — PROTECTOR ULNAR NERVE FOAM

## (undated) DEVICE — SET EXTENSION CLEARLINK 2INJ

## (undated) DEVICE — DRAPE ANGIO BRACH 38X44IN

## (undated) DEVICE — GLOVE SURG BIOGEL LATEX SZ 7.5

## (undated) DEVICE — Device

## (undated) DEVICE — SUT 2-0 ETHILON 18 FS

## (undated) DEVICE — DRESSING TRANS 4X4 TEGADERM

## (undated) DEVICE — CHLORAPREP 10.5 ML APPLICATOR

## (undated) DEVICE — BAND TR WITH INFLATOR

## (undated) DEVICE — CATH DIAG JACKY 3.5 6FRX110CM

## (undated) DEVICE — ETCO2 NC MICROSTR FEM ST ADLT

## (undated) DEVICE — SET IV PRIMARY

## (undated) DEVICE — SUT 3-0 VICRYL / SH (J416)